# Patient Record
Sex: MALE | Race: WHITE | HISPANIC OR LATINO | Employment: UNEMPLOYED | URBAN - METROPOLITAN AREA
[De-identification: names, ages, dates, MRNs, and addresses within clinical notes are randomized per-mention and may not be internally consistent; named-entity substitution may affect disease eponyms.]

---

## 2017-01-05 ENCOUNTER — ALLSCRIPTS OFFICE VISIT (OUTPATIENT)
Dept: OTHER | Facility: OTHER | Age: 52
End: 2017-01-05

## 2017-01-09 ENCOUNTER — ALLSCRIPTS OFFICE VISIT (OUTPATIENT)
Dept: OTHER | Facility: OTHER | Age: 52
End: 2017-01-09

## 2017-01-09 DIAGNOSIS — M25.561 PAIN IN RIGHT KNEE: ICD-10-CM

## 2017-01-09 DIAGNOSIS — M25.562 PAIN IN LEFT KNEE: ICD-10-CM

## 2017-01-27 ENCOUNTER — ALLSCRIPTS OFFICE VISIT (OUTPATIENT)
Dept: OTHER | Facility: OTHER | Age: 52
End: 2017-01-27

## 2017-02-28 ENCOUNTER — LAB (OUTPATIENT)
Dept: LAB | Facility: CLINIC | Age: 52
End: 2017-02-28
Payer: COMMERCIAL

## 2017-02-28 DIAGNOSIS — E03.9 HYPOTHYROIDISM: ICD-10-CM

## 2017-02-28 LAB
CHOLEST SERPL-MCNC: 178 MG/DL (ref 50–200)
HDLC SERPL-MCNC: 51 MG/DL (ref 40–60)
LDLC SERPL CALC-MCNC: 67 MG/DL (ref 0–100)
TRIGL SERPL-MCNC: 300 MG/DL
TSH SERPL DL<=0.05 MIU/L-ACNC: 2.2 UIU/ML (ref 0.36–3.74)

## 2017-02-28 PROCEDURE — 80061 LIPID PANEL: CPT

## 2017-02-28 PROCEDURE — 36415 COLL VENOUS BLD VENIPUNCTURE: CPT

## 2017-02-28 PROCEDURE — 84443 ASSAY THYROID STIM HORMONE: CPT

## 2017-03-16 ENCOUNTER — ALLSCRIPTS OFFICE VISIT (OUTPATIENT)
Dept: OTHER | Facility: OTHER | Age: 52
End: 2017-03-16

## 2017-06-08 ENCOUNTER — ALLSCRIPTS OFFICE VISIT (OUTPATIENT)
Dept: OTHER | Facility: OTHER | Age: 52
End: 2017-06-08

## 2017-06-08 DIAGNOSIS — E03.9 HYPOTHYROIDISM: ICD-10-CM

## 2017-07-17 ENCOUNTER — HOSPITAL ENCOUNTER (EMERGENCY)
Facility: HOSPITAL | Age: 52
Discharge: HOME | End: 2017-07-17
Attending: EMERGENCY MEDICINE | Admitting: EMERGENCY MEDICINE
Payer: COMMERCIAL

## 2017-07-17 ENCOUNTER — APPOINTMENT (EMERGENCY)
Dept: RADIOLOGY | Facility: HOSPITAL | Age: 52
End: 2017-07-17
Payer: COMMERCIAL

## 2017-07-17 VITALS
SYSTOLIC BLOOD PRESSURE: 110 MMHG | BODY MASS INDEX: 26.52 KG/M2 | OXYGEN SATURATION: 95 % | HEIGHT: 68 IN | TEMPERATURE: 98 F | DIASTOLIC BLOOD PRESSURE: 64 MMHG | RESPIRATION RATE: 16 BRPM | HEART RATE: 76 BPM | WEIGHT: 175 LBS

## 2017-07-17 DIAGNOSIS — R10.84 GENERALIZED ABDOMINAL PAIN: Primary | ICD-10-CM

## 2017-07-17 DIAGNOSIS — K59.00 CONSTIPATION: ICD-10-CM

## 2017-07-17 LAB
ALBUMIN SERPL BCP-MCNC: 3.5 G/DL (ref 3.5–5)
ALP SERPL-CCNC: 67 U/L (ref 46–116)
ALT SERPL W P-5'-P-CCNC: 32 U/L (ref 12–78)
ANION GAP SERPL CALCULATED.3IONS-SCNC: 6 MMOL/L (ref 4–13)
AST SERPL W P-5'-P-CCNC: 15 U/L (ref 5–45)
ATRIAL RATE: 79 BPM
BACTERIA UR QL AUTO: NORMAL /HPF
BASOPHILS # BLD AUTO: 0.02 THOUSANDS/ΜL (ref 0–0.1)
BASOPHILS NFR BLD AUTO: 0 % (ref 0–1)
BILIRUB SERPL-MCNC: 0.29 MG/DL (ref 0.2–1)
BILIRUB UR QL STRIP: NEGATIVE
BUN SERPL-MCNC: 15 MG/DL (ref 5–25)
CALCIUM SERPL-MCNC: 8.8 MG/DL (ref 8.3–10.1)
CHLORIDE SERPL-SCNC: 105 MMOL/L (ref 100–108)
CLARITY UR: CLEAR
CO2 SERPL-SCNC: 27 MMOL/L (ref 21–32)
COLOR UR: YELLOW
CREAT SERPL-MCNC: 1.25 MG/DL (ref 0.6–1.3)
EOSINOPHIL # BLD AUTO: 0.19 THOUSAND/ΜL (ref 0–0.61)
EOSINOPHIL NFR BLD AUTO: 3 % (ref 0–6)
ERYTHROCYTE [DISTWIDTH] IN BLOOD BY AUTOMATED COUNT: 13.9 % (ref 11.6–15.1)
GFR SERPL CREATININE-BSD FRML MDRD: >60 ML/MIN/1.73SQ M
GLUCOSE SERPL-MCNC: 117 MG/DL (ref 65–140)
GLUCOSE UR STRIP-MCNC: NEGATIVE MG/DL
HCT VFR BLD AUTO: 46.5 % (ref 36.5–49.3)
HGB BLD-MCNC: 15.4 G/DL (ref 12–17)
HGB UR QL STRIP.AUTO: ABNORMAL
HOLD SPECIMEN: NORMAL
HYALINE CASTS #/AREA URNS LPF: NORMAL /LPF
KETONES UR STRIP-MCNC: NEGATIVE MG/DL
LEUKOCYTE ESTERASE UR QL STRIP: NEGATIVE
LIPASE SERPL-CCNC: 157 U/L (ref 73–393)
LYMPHOCYTES # BLD AUTO: 1.43 THOUSANDS/ΜL (ref 0.6–4.47)
LYMPHOCYTES NFR BLD AUTO: 21 % (ref 14–44)
MCH RBC QN AUTO: 28.2 PG (ref 26.8–34.3)
MCHC RBC AUTO-ENTMCNC: 33.1 G/DL (ref 31.4–37.4)
MCV RBC AUTO: 85 FL (ref 82–98)
MONOCYTES # BLD AUTO: 0.5 THOUSAND/ΜL (ref 0.17–1.22)
MONOCYTES NFR BLD AUTO: 7 % (ref 4–12)
NEUTROPHILS # BLD AUTO: 4.56 THOUSANDS/ΜL (ref 1.85–7.62)
NEUTS SEG NFR BLD AUTO: 69 % (ref 43–75)
NITRITE UR QL STRIP: NEGATIVE
NON-SQ EPI CELLS URNS QL MICRO: NORMAL /HPF
NRBC BLD AUTO-RTO: 0 /100 WBCS
P AXIS: 61 DEGREES
PH UR STRIP.AUTO: 7 [PH] (ref 4.5–8)
PLATELET # BLD AUTO: 190 THOUSANDS/UL (ref 149–390)
PMV BLD AUTO: 10.3 FL (ref 8.9–12.7)
POTASSIUM SERPL-SCNC: 3.6 MMOL/L (ref 3.5–5.3)
PR INTERVAL: 148 MS
PROT SERPL-MCNC: 7.2 G/DL (ref 6.4–8.2)
PROT UR STRIP-MCNC: NEGATIVE MG/DL
QRS AXIS: 133 DEGREES
QRSD INTERVAL: 82 MS
QT INTERVAL: 388 MS
QTC INTERVAL: 444 MS
RBC # BLD AUTO: 5.47 MILLION/UL (ref 3.88–5.62)
RBC #/AREA URNS AUTO: NORMAL /HPF
SODIUM SERPL-SCNC: 138 MMOL/L (ref 136–145)
SP GR UR STRIP.AUTO: 1.01 (ref 1–1.03)
T WAVE AXIS: 63 DEGREES
TSH SERPL DL<=0.05 MIU/L-ACNC: 1.92 UIU/ML (ref 0.36–3.74)
UROBILINOGEN UR QL STRIP.AUTO: 0.2 E.U./DL
VENTRICULAR RATE: 79 BPM
WBC # BLD AUTO: 6.72 THOUSAND/UL (ref 4.31–10.16)
WBC #/AREA URNS AUTO: NORMAL /HPF

## 2017-07-17 PROCEDURE — 80053 COMPREHEN METABOLIC PANEL: CPT | Performed by: EMERGENCY MEDICINE

## 2017-07-17 PROCEDURE — 96374 THER/PROPH/DIAG INJ IV PUSH: CPT

## 2017-07-17 PROCEDURE — 83690 ASSAY OF LIPASE: CPT | Performed by: EMERGENCY MEDICINE

## 2017-07-17 PROCEDURE — 96361 HYDRATE IV INFUSION ADD-ON: CPT

## 2017-07-17 PROCEDURE — 85025 COMPLETE CBC W/AUTO DIFF WBC: CPT | Performed by: EMERGENCY MEDICINE

## 2017-07-17 PROCEDURE — 99284 EMERGENCY DEPT VISIT MOD MDM: CPT

## 2017-07-17 PROCEDURE — 84443 ASSAY THYROID STIM HORMONE: CPT | Performed by: EMERGENCY MEDICINE

## 2017-07-17 PROCEDURE — 74177 CT ABD & PELVIS W/CONTRAST: CPT

## 2017-07-17 PROCEDURE — 81001 URINALYSIS AUTO W/SCOPE: CPT

## 2017-07-17 PROCEDURE — 93005 ELECTROCARDIOGRAM TRACING: CPT | Performed by: EMERGENCY MEDICINE

## 2017-07-17 PROCEDURE — 36415 COLL VENOUS BLD VENIPUNCTURE: CPT

## 2017-07-17 RX ORDER — ONDANSETRON 2 MG/ML
4 INJECTION INTRAMUSCULAR; INTRAVENOUS ONCE
Status: COMPLETED | OUTPATIENT
Start: 2017-07-17 | End: 2017-07-17

## 2017-07-17 RX ORDER — POLYETHYLENE GLYCOL 3350 17 G/17G
17 POWDER, FOR SOLUTION ORAL DAILY
Qty: 119 G | Refills: 0 | Status: SHIPPED | OUTPATIENT
Start: 2017-07-17 | End: 2018-05-06

## 2017-07-17 RX ORDER — DOCUSATE SODIUM 100 MG/1
100 CAPSULE, LIQUID FILLED ORAL 2 TIMES DAILY
Qty: 30 CAPSULE | Refills: 0 | Status: SHIPPED | OUTPATIENT
Start: 2017-07-17 | End: 2019-03-21

## 2017-07-17 RX ADMIN — SODIUM CHLORIDE 1000 ML: 0.9 INJECTION, SOLUTION INTRAVENOUS at 12:01

## 2017-07-17 RX ADMIN — IOHEXOL 100 ML: 350 INJECTION, SOLUTION INTRAVENOUS at 12:19

## 2017-07-17 RX ADMIN — ONDANSETRON 4 MG: 2 INJECTION INTRAMUSCULAR; INTRAVENOUS at 12:01

## 2017-12-05 ENCOUNTER — APPOINTMENT (OUTPATIENT)
Dept: LAB | Facility: CLINIC | Age: 52
End: 2017-12-05
Payer: COMMERCIAL

## 2017-12-05 DIAGNOSIS — Z79.899 ENCOUNTER FOR LONG-TERM (CURRENT) USE OF MEDICATIONS: Primary | ICD-10-CM

## 2017-12-05 DIAGNOSIS — E03.9 HYPOTHYROIDISM: ICD-10-CM

## 2017-12-05 DIAGNOSIS — E55.9 VITAMIN D INSUFFICIENCY: ICD-10-CM

## 2017-12-05 LAB
25(OH)D3 SERPL-MCNC: 21.8 NG/ML (ref 30–100)
ALBUMIN SERPL BCP-MCNC: 3.3 G/DL (ref 3.5–5)
ALP SERPL-CCNC: 68 U/L (ref 46–116)
ALT SERPL W P-5'-P-CCNC: 60 U/L (ref 12–78)
ANION GAP SERPL CALCULATED.3IONS-SCNC: 5 MMOL/L (ref 4–13)
AST SERPL W P-5'-P-CCNC: 29 U/L (ref 5–45)
BASOPHILS # BLD AUTO: 0.03 THOUSANDS/ΜL (ref 0–0.1)
BASOPHILS NFR BLD AUTO: 1 % (ref 0–1)
BILIRUB SERPL-MCNC: 0.43 MG/DL (ref 0.2–1)
BUN SERPL-MCNC: 14 MG/DL (ref 5–25)
CALCIUM SERPL-MCNC: 8.7 MG/DL (ref 8.3–10.1)
CHLORIDE SERPL-SCNC: 106 MMOL/L (ref 100–108)
CHOLEST SERPL-MCNC: 163 MG/DL (ref 50–200)
CO2 SERPL-SCNC: 27 MMOL/L (ref 21–32)
CREAT SERPL-MCNC: 1.11 MG/DL (ref 0.6–1.3)
EOSINOPHIL # BLD AUTO: 0.27 THOUSAND/ΜL (ref 0–0.61)
EOSINOPHIL NFR BLD AUTO: 5 % (ref 0–6)
ERYTHROCYTE [DISTWIDTH] IN BLOOD BY AUTOMATED COUNT: 14.2 % (ref 11.6–15.1)
EST. AVERAGE GLUCOSE BLD GHB EST-MCNC: 117 MG/DL
FOLATE SERPL-MCNC: 18 NG/ML (ref 3.1–17.5)
GFR SERPL CREATININE-BSD FRML MDRD: 75 ML/MIN/1.73SQ M
GLUCOSE P FAST SERPL-MCNC: 94 MG/DL (ref 65–99)
HBA1C MFR BLD: 5.7 % (ref 4.2–6.3)
HCT VFR BLD AUTO: 44.8 % (ref 36.5–49.3)
HDLC SERPL-MCNC: 47 MG/DL (ref 40–60)
HGB BLD-MCNC: 14.7 G/DL (ref 12–17)
LDLC SERPL CALC-MCNC: 80 MG/DL (ref 0–100)
LYMPHOCYTES # BLD AUTO: 1.84 THOUSANDS/ΜL (ref 0.6–4.47)
LYMPHOCYTES NFR BLD AUTO: 34 % (ref 14–44)
MCH RBC QN AUTO: 28.1 PG (ref 26.8–34.3)
MCHC RBC AUTO-ENTMCNC: 32.8 G/DL (ref 31.4–37.4)
MCV RBC AUTO: 86 FL (ref 82–98)
MONOCYTES # BLD AUTO: 0.52 THOUSAND/ΜL (ref 0.17–1.22)
MONOCYTES NFR BLD AUTO: 10 % (ref 4–12)
NEUTROPHILS # BLD AUTO: 2.67 THOUSANDS/ΜL (ref 1.85–7.62)
NEUTS SEG NFR BLD AUTO: 50 % (ref 43–75)
NRBC BLD AUTO-RTO: 0 /100 WBCS
PLATELET # BLD AUTO: 180 THOUSANDS/UL (ref 149–390)
PMV BLD AUTO: 10.8 FL (ref 8.9–12.7)
POTASSIUM SERPL-SCNC: 3.8 MMOL/L (ref 3.5–5.3)
PROT SERPL-MCNC: 7.2 G/DL (ref 6.4–8.2)
PSA SERPL-MCNC: 0.6 NG/ML (ref 0–4)
RBC # BLD AUTO: 5.24 MILLION/UL (ref 3.88–5.62)
SODIUM SERPL-SCNC: 138 MMOL/L (ref 136–145)
T4 FREE SERPL-MCNC: 1.05 NG/DL (ref 0.76–1.46)
TRIGL SERPL-MCNC: 182 MG/DL
TSH SERPL DL<=0.05 MIU/L-ACNC: 2.89 UIU/ML (ref 0.36–3.74)
VIT B12 SERPL-MCNC: 236 PG/ML (ref 100–900)
WBC # BLD AUTO: 5.36 THOUSAND/UL (ref 4.31–10.16)

## 2017-12-05 PROCEDURE — 82607 VITAMIN B-12: CPT

## 2017-12-05 PROCEDURE — 80053 COMPREHEN METABOLIC PANEL: CPT

## 2017-12-05 PROCEDURE — G0103 PSA SCREENING: HCPCS

## 2017-12-05 PROCEDURE — 85025 COMPLETE CBC W/AUTO DIFF WBC: CPT

## 2017-12-05 PROCEDURE — 36415 COLL VENOUS BLD VENIPUNCTURE: CPT

## 2017-12-05 PROCEDURE — 82746 ASSAY OF FOLIC ACID SERUM: CPT

## 2017-12-05 PROCEDURE — 80061 LIPID PANEL: CPT

## 2017-12-05 PROCEDURE — 83036 HEMOGLOBIN GLYCOSYLATED A1C: CPT

## 2017-12-05 PROCEDURE — 84439 ASSAY OF FREE THYROXINE: CPT

## 2017-12-05 PROCEDURE — 82306 VITAMIN D 25 HYDROXY: CPT

## 2017-12-05 PROCEDURE — 84443 ASSAY THYROID STIM HORMONE: CPT

## 2017-12-14 ENCOUNTER — GENERIC CONVERSION - ENCOUNTER (OUTPATIENT)
Dept: OTHER | Facility: OTHER | Age: 52
End: 2017-12-14

## 2017-12-14 DIAGNOSIS — M25.562 PAIN IN LEFT KNEE: ICD-10-CM

## 2017-12-14 DIAGNOSIS — M25.561 PAIN IN RIGHT KNEE: ICD-10-CM

## 2018-01-10 NOTE — RESULT NOTES
Message   pt has appt with me on 8/23/16- will discuss whether to reduce to 137mcg/day or alternate with the 150mcg every other day    will also supplement the low Vit D = 25) Plan to discuss with pt at appt in less than 2 weeks  Verified Results  (1) COMPREHENSIVE METABOLIC PANEL [id number] 98:76KL Ana Bae     Test Name Result Flag Reference   GLUCOSE,RANDM 91 mg/dL     If the patient is fasting, the ADA then defines impaired fasting glucose as > 100 mg/dL and diabetes as > or equal to 123 mg/dL  SODIUM 141 mmol/L  136-145   POTASSIUM 3 5 mmol/L  3 5-5 3   CHLORIDE 105 mmol/L  100-108   CARBON DIOXIDE 29 mmol/L  21-32   ANION GAP (CALC) 7 mmol/L  4-13   BLOOD UREA NITROGEN 12 mg/dL  5-25   CREATININE 1 07 mg/dL  0 60-1 30   Standardized to IDMS reference method   CALCIUM 8 9 mg/dL  8 3-10 1   BILI, TOTAL 0 28 mg/dL  0 20-1 00   ALK PHOSPHATAS 72 U/L     ALT (SGPT) 23 U/L  12-78   AST(SGOT) 12 U/L  5-45   ALBUMIN 3 5 g/dL  3 5-5 0   TOTAL PROTEIN 7 2 g/dL  6 4-8 2   eGFR Non-African American      >60 0 ml/min/1 73sq [facility] Disease Education Program recommendations are as follows:  GFR calculation is accurate only with a steady state creatinine  Chronic Kidney disease less than 60 ml/min/1 73 sq  meters  Kidney failure less than 15 ml/min/1 73 sq  meters  (1) TSH [id number] 09:59AM Ana Bae     Test Name Result Flag Reference   TSH 0 160 uIU/mL L 0 358-3 740   Patients undergoing fluorescein dye angiography may retain small amounts of fluorescein in the body for 48-72 hours post procedure  Samples containing fluorescein can produce falsely depressed TSH values  If the patient had this procedure,a specimen should be resubmitted post fluorescein clearance       (1) LIPID PANEL, FASTING [id number] 09:59AM Ana Bae     Test Name Result Flag Reference   CHOLESTEROL 150 mg/dL     HDL,DIRECT 48 mg/dL  40-60   Specimen collection should occur prior to Metamizole administration due to the potential for falsely depressed results  LDL CHOLESTEROL CALCULATED 54 mg/dL  0-100   Triglyceride:         Normal              <150 mg/dl       Borderline High    150-199 mg/dl       High               200-499 mg/dl       Very High          >499 mg/dl  Cholesterol:         Desirable        <200 mg/dl      Borderline High  200-239 mg/dl      High             >239 mg/dl  HDL Cholesterol:        High    >59 mg/dL      Low     <41 mg/dL  LDL CALCULATED:    This screening LDL is a calculated result  It does not have the accuracy of the Direct Measured LDL in the monitoring of patients with hyperlipidemia and/or statin therapy  Direct Measure LDL (ABD863) must be ordered separately in these patients  TRIGLYCERIDES 240 mg/dL H <=150   Specimen collection should occur prior to N-Acetylcysteine or Metamizole administration due to the potential for falsely depressed results         Plan  Low vitamin D level    · Vitamin D3 2000 UNIT Oral Capsule; TAKE 1 TABLET BY MOUTH ONCE DAILY

## 2018-01-12 VITALS
BODY MASS INDEX: 26.2 KG/M2 | SYSTOLIC BLOOD PRESSURE: 110 MMHG | TEMPERATURE: 98.3 F | HEART RATE: 60 BPM | DIASTOLIC BLOOD PRESSURE: 70 MMHG | WEIGHT: 172.84 LBS | HEIGHT: 68 IN

## 2018-01-12 NOTE — RESULT NOTES
Message   please call pt and find out if he's been taking his Levothyroxine- in the early AM, on an empty stomach    his TSH is elevated, but first verify meds being taken  Verified Results  (1) TSH WITH FT4 REFLEX [id number] 12:16PM Malou Prim     Test Name Result Flag Reference   TSH 11 000 uIU/mL H 0 358-3 740   Patients undergoing fluorescein dye angiography may retain small amounts of fluorescein in the body for 48-72 hours post procedure  Samples containing fluorescein can produce falsely depressed TSH values  If the patient had this procedure,a specimen should be resubmitted post fluorescein clearance

## 2018-01-13 VITALS
BODY MASS INDEX: 25.08 KG/M2 | TEMPERATURE: 97.2 F | HEIGHT: 69 IN | WEIGHT: 169.31 LBS | DIASTOLIC BLOOD PRESSURE: 64 MMHG | HEART RATE: 72 BPM | SYSTOLIC BLOOD PRESSURE: 102 MMHG

## 2018-01-13 VITALS
SYSTOLIC BLOOD PRESSURE: 108 MMHG | HEIGHT: 69 IN | HEART RATE: 80 BPM | DIASTOLIC BLOOD PRESSURE: 60 MMHG | WEIGHT: 169.75 LBS | BODY MASS INDEX: 25.14 KG/M2 | TEMPERATURE: 97.6 F

## 2018-01-14 VITALS
TEMPERATURE: 97.3 F | HEIGHT: 68 IN | HEART RATE: 72 BPM | BODY MASS INDEX: 25.73 KG/M2 | DIASTOLIC BLOOD PRESSURE: 80 MMHG | WEIGHT: 169.75 LBS | SYSTOLIC BLOOD PRESSURE: 100 MMHG

## 2018-01-14 VITALS
BODY MASS INDEX: 26.4 KG/M2 | DIASTOLIC BLOOD PRESSURE: 68 MMHG | WEIGHT: 174.16 LBS | TEMPERATURE: 98.3 F | HEIGHT: 68 IN | HEART RATE: 74 BPM | SYSTOLIC BLOOD PRESSURE: 110 MMHG

## 2018-01-14 NOTE — PROGRESS NOTES
Assessment    1  Left knee pain (719 46) (M25 562)    Plan    · Meloxicam 15 MG Oral Tablet; TAKE 1 TABLET DAILY WITH FOOD   · *1 - [facility] Physical Therapy  Consult  Status: Hold For - Scheduling   Requested for: 20Apr2016  Care Summary provided  : Yes   · Follow-up PRN Evaluation and Treatment  Follow-up  Status: Complete  Done:  20Apr2016    Discussion/Summary    44 yo male with left knee pain secondary to osteoarthritis  Pt's pain controlled with meloxicam and tramadol  Will have patient complete physical therapy and follow up prn  Chief Complaint  left knee pain      History of Present Illness  HPI: 44 yo male with left knee pain secondary to arthritis  Pt taking meloxicam and tramadol which works well for his pain  Pt was to go for Physical therapy but needed to cancel due to heart problems  Pt seen by cardiologist and stress echo was normal        Hospital Based Practices Required Assessment:   Pain Assessment   the patient states they have pain  The pain is located in the L knee  The patient describes the pain as "tight"  (on a scale of 0 to 10, the patient rates the pain at 7 )    Prefered Language is  Jamaican  Primary Language is  Jamaican  Review of Systems    Musculoskeletal: as noted in HPI  ROS reviewed  Active Problems    1  Allergic rhinitis (477 9) (J30 9)   2  Bipolar disorder (296 80) (F31 9)   3  Cataract, left eye (366 9) (H26 9)   4  Chest pain (786 50) (R07 9)   5  Chronic low back pain (724 2,338 29) (M54 5,G89 29)   6  Constipation (564 00) (K59 00)   7  Epidermoid cyst of skin (706 2) (L72 0)   8  Glaucoma of both eyes (365 9) (H40 9)   9  Hyperlipidemia (272 4) (E78 5)   10  Hypothyroidism (244 9) (E03 9)   11  Left knee pain (719 46) (M25 562)   12  History of Need for prophylactic vaccination and inoculation against influenza (V04 81)    (Z23)   13  Pain in joint of left hip (719 45) (M25 552)   14   Right knee pain (719 46) (M25 561)    Past Medical History    · History of Cerumen impaction (380 4) (H61 20)   · History of Contusion of skin with intact surface (924 9) (T14 8)   · History of upper respiratory infection (V12 09) (Z87 09)   · History of Need for immunization against influenza (V04 81) (Z23)   · History of Need for prophylactic vaccination and inoculation against influenza (V04 81)  (Z23)   · Personal history of hyperthyroidism (V12 29) (Z86 39)   · History of Shoulder joint pain, unspecified laterality   · History of Sprain of ankle (845 00) (S93 409A)   · History of Viral upper respiratory infection (465 9) (J06 9,B97 89)    The active problems and past medical history were reviewed and updated today  Surgical History    · History of Cholecystectomy Laparoscopic   · History of Eye Surgery    The surgical history was reviewed and updated today  Family History    · Family history of diabetes mellitus (V18 0) (Z83 3)   · Family history of hyperlipidemia (V18 19) (Z83 49)   · Family history of Thyroid disorder    · Family history of dementia (V17 2) (Z81 8)    · Family history of CAD (coronary artery disease)   · Family history of hypertension (V17 49) (Z82 49)   · Family history of myocardial infarction (V17 3) (Z82 49)    The family history was reviewed and updated today  Social History    · Being A Social Drinker   · Marital History - Single   · Never A Smoker   · No drug use   · Non-smoker (V49 89) (Z78 9)   · Occasional caffeine consumption   · Sedentary Lifestyle (V69 0)  The social history was reviewed and updated today  Current Meds   1  Brimonidine Tartrate 0 15 % Ophthalmic Solution; INSTILL 1 DROP INTO BOTH EYES   TWICE DAILY; Therapy: (Recorded:08Oct2015) to Recorded   2  Docusate Sodium 100 MG Oral Capsule; TOME CORNELL CAPSULA POR VIA ORAL DOS   VECES AL SOPHY ASHELY FUERA NECESARIOTAKE ONE CAPSULE BY MOUTH TWICE   DAILY AS NEEDED; Therapy: 26ARI2398 to (Last Rx:19Apr2016)  Requested for: 19Apr2016 Ordered   3  Fluticasone Propionate 50 MCG/ACT Nasal Suspension; USE 2 SPRAYS IN EACH   NOSTRIL ONCE DAILY; Therapy: 03JDX0226 to (Last Rx:08Oct2015)  Requested for: 30IJK1724 Ordered   4  Latanoprost 0 005 % Ophthalmic Solution; INSTILL 1 DROP  INTO AFFECTED EYE(S)   ONCE DAILY AS DIRECTED; Therapy: (Recorded:08Oct2015) to Recorded   5  Levothyroxine Sodium 150 MCG Oral Tablet; TAKE ONE TABLET BY MOUTH ONCE A   DAY; Therapy: 66Mvs4919 to (Reyna Mcdonald)  Requested for: 67EMU9035; Last   Rx:10Mar2016 Ordered   6  Loratadine 10 MG Oral Tablet; TAKE 1 TABLET DAILY; Therapy: 89UXS7450 to (Juan David Rivera)  Requested for: 65LKC0448; Last   Rx:08Oct2015 Ordered   7  Lovastatin 10 MG Oral Tablet; TAKE 1 TABLET AT BEDTIME; Therapy: 90VYC2526 to (Reyna Mcdonald)  Requested for: 49VRH1679; Last   Rx:10Mar2016 Ordered   8  Meloxicam 15 MG Oral Tablet; TAKE 1 TABLET DAILY WITH FOOD; Therapy: 60AHU0821 to (Tung Lopez)  Requested for: 69QCI1592; Last   Rx:02Nov2015 Ordered   9  Polyethylene Glycol 3350 Oral Powder; MIX 1 CAPFUL (17GM) IN 8 OUNCES OF   WATER, JUICE, OR TEA AND DRINK DAILY; Therapy: 18Klr1210 to (Juan David Rivera)  Requested for: 98Twd6809; Last   Rx:08Oct2015 Ordered   10  SEROquel 100 MG Oral Tablet; Therapy: [phone]) to Recorded   11  Timolol Maleate 0 5 % Ophthalmic Solution; pt takes 1 gtt into both eyes every AM;    Therapy: (Recorded:08Oct2015) to Recorded   12  TraMADol HCl - 50 MG Oral Tablet; 1-2 tabs po q 4-6 hrs prn; Therapy: 08Apr2015 to (Last Rx:02Nov2015) Ordered   13  TraZODone HCl - 150 MG Oral Tablet; Therapy: [phone]) to Recorded    The medication list was reviewed and updated today  Allergies    1  No Known Drug Allergies    2  Pollen   3   Ragweed    Vitals   Recorded: 20Apr2016 10:36AM   Temperature 97 8 F   Heart Rate 76   Systolic 90   Diastolic 56   Height 5 ft 6 in   Weight 153 lb 14 08 oz   BMI Calculated 24 84   BSA Calculated 1 79     Physical Exam  Left knee- FROM, positive crepitus, no laxity or swelling      End of Encounter Meds    1  Fluticasone Propionate 50 MCG/ACT Nasal Suspension; USE 2 SPRAYS IN EACH   NOSTRIL ONCE DAILY; Therapy: 08CEL4753 to (Last Rx:08Oct2015)  Requested for: 14KLA1658 Ordered   2  Loratadine 10 MG Oral Tablet; TAKE 1 TABLET DAILY; Therapy: 02TNT9756 to (German Easley)  Requested for: 29ETE2169; Last   Rx:08Oct2015 Ordered    3  Brimonidine Tartrate 0 15 % Ophthalmic Solution; INSTILL 1 DROP INTO BOTH EYES   TWICE DAILY; Therapy: (Recorded:08Oct2015) to Recorded   4  Latanoprost 0 005 % Ophthalmic Solution; INSTILL 1 DROP  INTO AFFECTED EYE(S)   ONCE DAILY AS DIRECTED; Therapy: (Recorded:08Oct2015) to Recorded   5  Timolol Maleate 0 5 % Ophthalmic Solution; pt takes 1 gtt into both eyes every AM;   Therapy: (Recorded:08Oct2015) to Recorded    6  TraMADol HCl - 50 MG Oral Tablet; 1-2 tabs po q 4-6 hrs prn; Therapy: 08Apr2015 to (Last Rx:02Nov2015) Ordered    7  Docusate Sodium 100 MG Oral Capsule; TOME CORNELL CAPSULA POR VIA ORAL DOS   VECES AL SOPHY ASHELY FUERA NECESARIOTAKE ONE CAPSULE BY MOUTH TWICE   DAILY AS NEEDED; Therapy: 55BXP5194 to (Last Rx:19Apr2016)  Requested for: 19Apr2016 Ordered    8  Lovastatin 10 MG Oral Tablet; TAKE 1 TABLET AT BEDTIME; Therapy: 62DJV8780 to (Kirstyn Wiseman)  Requested for: 69PYK6755; Last   Rx:10Mar2016 Ordered    9  Levothyroxine Sodium 150 MCG Oral Tablet; TAKE ONE TABLET BY MOUTH ONCE A   DAY; Therapy: 96Ixl0073 to (Kristyn Wiseman)  Requested for: 81UNR2910; Last   Rx:10Mar2016 Ordered   10  Polyethylene Glycol 3350 Oral Powder; MIX 1 CAPFUL (17GM) IN 8 OUNCES OF    WATER, JUICE, OR TEA AND DRINK DAILY; Therapy: 34Rgw3371 to (German Easley)  Requested for: 27Qqc0608; Last    Rx:08Oct2015 Ordered    11  Meloxicam 15 MG Oral Tablet; TAKE 1 TABLET DAILY WITH FOOD;     Therapy: 14IPC7228 to (Evaluate:41Elp6824)  Requested for: 20Apr2016; Last    Rx:20Apr2016 Ordered    12  SEROquel 100 MG Oral Tablet (QUEtiapine Fumarate); Therapy: (Recorded:08Oct2015) to Recorded   13  TraZODone HCl - 150 MG Oral Tablet; Therapy: (Recorded:08Oct2015) to Recorded    Future Appointments    Date/Time Provider Specialty Site   04/28/2016 10:20 AM Lissette Manning PCP   04/22/2016 11:15 AM Stan Barreto MD Cardiology [place] [id number]     Signatures   Electronically signed by : HOA Eagle;  Apr 20 2016 11:01AM EST                       (Author)    Electronically signed by : GINGER Castillo ; Apr 20 2016  1:07PM EST

## 2018-01-15 NOTE — PROGRESS NOTES
Assessment    1  Bilateral knee pain (719 46) (M25 561,M25 562)   2  Depression with anxiety (300 4) (F41 8)   3  Impacted cerumen of left ear (380 4) (H61 22)   4  Hypothyroidism (244 9) (E03 9)   5  Hyperlipidemia (272 4) (E78 5)    Plan   Allergic rhinitis    · Alaway 0 025 % Ophthalmic Solution; INSTILL 1 DROP IN THE AFFECTED EYE(S) EVERY 12  HOURS AS NEEDED   · Fluticasone Propionate 50 MCG/ACT Nasal Suspension; USE 2 SPRAYS IN EACH NOSTRIL  ONCE DAILY   · Loratadine 10 MG Oral Tablet; TAKE 1 TABLET DAILY  Bipolar disorder    · From  SEROquel 100 MG Oral Tablet  To QUEtiapine Fumarate 100 MG Oral Tablet  (SEROquel) TAKE 1 TABLET AT BEDTIME  Cataract, left eye, Glaucoma of both eyes    · Brimonidine Tartrate 0 15 % Ophthalmic Solution; INSTILL 1 DROP INTO BOTH EYES TWICE  DAILY   · Latanoprost 0 005 % Ophthalmic Solution; INSTILL 1 DROP  INTO AFFECTED EYE(S) ONCE  DAILY AS DIRECTED   · Timolol Maleate 0 5 % Ophthalmic Solution; pt takes 1 gtt into both eyes every AM  Chronic low back pain, PMH: Left knee pain, Pain in joint of left hip, PMH: Right knee pain    · From  TraMADol HCl - 50 MG Oral Tablet 1-2 tabs po q 4-6 hrs prn To TraMADol HCl - 50  MG Oral Tablet TAKE 1 TABLET EVERY 4 TO 6 HOURS AS NEEDED  Constipation    · Docusate Sodium 100 MG Oral Capsule; TAKE 1 CAPSULE TWICE DAILY AS NEEDED  Hyperlipidemia    · Lovastatin 10 MG Oral Tablet; TAKE 1 TABLET AT BEDTIME  Hypothyroidism    · Levothyroxine Sodium 150 MCG Oral Tablet   · Levothyroxine Sodium 125 MCG Oral Tablet; take 1 tab po daily   · Polyethylene Glycol 3350 Oral Powder; MIX 1 CAPFUL (17GM) IN 8 OUNCES OF WATER,  JUICE, OR TEA AND DRINK DAILY   · (1) LIPID PANEL, FASTING; Status:Active; Requested for:15Feb2017;    · (1) TSH WITH FT4 REFLEX; Status:Active; Requested for:01Oct2016;    · (1) TSH; Status:Active; Requested for:15Feb2017;    · *VB-Depression Screening; Status:Active;  Requested for:15Feb2017;   Impacted cerumen of left ear    · Debrox 6 5 % Otic Solution; Instill 5-10 drops twice daily for up to 4 days   · Clean the outside of your ears with a washcloth  There is no need to clean the ear canal ;  Status:Complete;   Done: 68ABC2582   · How to use ear drops:; Status:Complete;   Done: 87KZN1134   · Never put cotton swabs inside your ears ; Status:Complete;   Done: 75PZF2631   · To remove earwax at home, use a soft-tipped rubber ear syringe ; Status:Complete;   Done:  83VMZ1966  Low vitamin D level    · Vitamin D3 2000 UNIT Oral Capsule; TAKE 1 TABLET BY MOUTH ONCE DAILY  PMH: Left knee pain    · Meloxicam 15 MG Oral Tablet; TAKE 1 TABLET DAILY WITH FOOD    *1 - [address] [facility] Physician Referral  Consult  Status: Hold For - Scheduling  Requested for: 67Xdf8826  Ordered; For: Bilateral knee pain;  Ordered By: Shauna Herman  Performed:   Due: 93QKW6299     Discussion/Summary  Discussion Summary:   I'm going to reduce your Levothyroixine from 150mcg/day DOWN to 125mcg/day  since doing that - we will recheck your TFTs in 6-8 weeks (October)  f/u with me every 6months  next do FEB 2017  do labs before next appt also  no other med changes  rx: Debrox drops to L ear wax impaction  f/u if not better  rec flushot in the Fall  Medication SE Review and Pt Understands Tx: Possible side effects of new medications were reviewed with the patient/guardian today  The treatment plan was reviewed with the patient/guardian  The patient/guardian understands and agrees with the treatment plan      Chief Complaint  Chief Complaint Free Text Note Form: here for early 6mo f/u  he likes to come every 3 months instead of every 6months  has hypothyroidism/ cholesterol/ constipation/  had recent labs done and for review   Chief Complaint Chronic Condition [facility]: Patient is here today for follow up of chronic conditions described in HPI        History of Present Illness  HPI: as above  most of his labs good- will adjust thyroid pill and put on Vit D supplement for Vit D insufficiency  pt c/o R knee 'clicking" with walking  he walks everyday but for past 2 weeks d/o R knee clicking and L knee pains   Hospital Based Practices Required Assessment:   Pain Assessment   the patient states they have pain  The pain is located in the Patient states pain in both knees  The patient describes the pain as unable to describe  (on a scale of 0 to 10, the patient rates the pain at 8 )    Prefered Language is  Taiwanese  Primary Language is  Taiwanese  Review of Systems  Complete-Male:   Constitutional: as noted in HPI  Eyes: eyesight problems and wears glasses- has eye doctor appt with Dr Valentín Harvey this week, but as noted in HPI    ENT: itchy ears lucila, but no pain or difficulty hearing, but as noted in HPI  Cardiovascular: as noted in HPI  Respiratory: No complaints of shortness of breath, no wheezing, no cough, no SOB on exertion, no orthopnea or PND  Gastrointestinal: constipation and takes miralax and now has BM 2x/day, but as noted in HPI  Genitourinary: No complaints of dysuria, no incontinence, no hesitancy, no nocturia, no genital lesion, no testicular pain  Musculoskeletal: lucila knee pains, but as noted in HPI  Psychiatric: anxiety, depression and sees [facility]/ Dr Alondra Savage and takes meds, but as noted in HPI  Endocrine: hypothyroid, but as noted in HPI  ROS Reviewed:   ROS reviewed  Active Problems    1  Allergic rhinitis (477 9) (J30 9)   2  Bipolar disorder (296 80) (F31 9)   3  Cataract, left eye (366 9) (H26 9)   4  Chronic low back pain (724 2,338 29) (M54 5,G89 29)   5  Constipation (564 00) (K59 00)   6  Epidermoid cyst of skin (706 2) (L72 0)   7  Glaucoma of both eyes (365 9) (H40 9)   8  Hyperlipidemia (272 4) (E78 5)   9  Hypothyroidism (244 9) (E03 9)   10  Low vitamin D level (268 9) (E55 9)   11  History of Need for prophylactic vaccination and inoculation against influenza (V04 81) (Z23)   12   Pain in joint of left hip (719 45) (M25 552)    Past Medical History    1  History of Cerumen impaction (380 4) (H61 20)   2  History of Contusion of skin with intact surface (924 9) (T14 8)   3  History of chest pain (V13 89) (Z87 898)   4  History of upper respiratory infection (V12 09) (Z87 09)   5  History of Left knee pain (719 46) (M25 562)   6  History of Need for immunization against influenza (V04 81) (Z23)   7  History of Need for prophylactic vaccination and inoculation against influenza (V04 81) (Z23)   8  Personal history of hyperthyroidism (V12 29) (Z86 39)   9  History of Right knee pain (719 46) (M25 561)   10  History of Screening for breast cancer (V76 10) (Z12 39)   11  History of Shoulder joint pain, unspecified laterality   12  History of Sprain of ankle (845 00) (S93 409A)   13  History of Viral upper respiratory infection (465 9) (J06 9,B97 89)    Surgical History    1  History of Cholecystectomy Laparoscopic   2  History of Eye Surgery    Family History  Mother    1  Family history of diabetes mellitus (V18 0) (Z83 3)   2  Family history of hyperlipidemia (V18 19) (Z83 49)   3  Family history of Thyroid disorder  Father    4  Family history of dementia (V17 2) (Z81 8)  Maternal Grandmother    5  Family history of CAD (coronary artery disease)   6  Family history of hypertension (V17 49) (Z82 49)   7  Family history of myocardial infarction (V17 3) (Z82 49)    Social History    · Being A Social Drinker   · Marital History - Single   · Never A Smoker   · No drug use   · Non-smoker (V49 89) (Z78 9)   · Occasional caffeine consumption   · Sedentary Lifestyle (V69 0)    Current Meds   1  Alaway 0 025 % Ophthalmic Solution; INSTILL 1 DROP IN THE AFFECTED EYE(S) EVERY 12 HOURS   AS NEEDED; Therapy: 90WTV0943 to (Lawson Seen)  Requested for: 11GCI0293; Last Rx:75Gyu2729   Ordered   2  Ativan 1 MG Oral Tablet; TAKE 1 TABLET AT BEDTIME; Therapy: (Viveca Sero) to Recorded   3   Brimonidine Tartrate 0 15 % Ophthalmic Solution; INSTILL 1 DROP INTO BOTH EYES TWICE DAILY; Therapy: (Demi Friedman) to Recorded   4  Docusate Sodium 100 MG Oral Capsule; TAKE 1 CAPSULE TWICE DAILY AS NEEDED; Therapy: 34OWX2731 to (Evaluate:13Nov2016)  Requested for: 37XPG7542; Last Rx:17May2016   Ordered   5  Fluticasone Propionate 50 MCG/ACT Nasal Suspension; USE 2 SPRAYS IN EACH NOSTRIL ONCE   DAILY; Therapy: 23Apr2015 to (Last Rx:17May2016)  Requested for: 48CFJ3456 Ordered   6  Latanoprost 0 005 % Ophthalmic Solution; INSTILL 1 DROP  INTO AFFECTED EYE(S) ONCE DAILY AS   DIRECTED; Therapy: (Demi Friedman) to Recorded   7  Levothyroxine Sodium 125 MCG Oral Tablet; take 1 tab po daily; Therapy: (Demi Friedman) to Recorded   8  Levothyroxine Sodium 150 MCG Oral Tablet; TAKE ONE TABLET BY MOUTH ONCE A DAY; Therapy: 17Bnh3781 to ([facility])  Requested for: 96YCN3568; Last Rx:10Mar2016   Ordered   9  Loratadine 10 MG Oral Tablet; TAKE 1 TABLET DAILY; Therapy: 48NXD3319 to (Evaluate:13Nov2016)  Requested for: 30WMH6440; Last Rx:17May2016   Ordered   10  Lovastatin 10 MG Oral Tablet; TAKE 1 TABLET AT BEDTIME; Therapy: 62PYH1986 to ([facility])  Requested for: 84LQX6827; Last Rx:10Mar2016    Ordered   11  Meloxicam 15 MG Oral Tablet; TAKE 1 TABLET DAILY WITH FOOD; Therapy: 01TXC3461 to (Evaluate:74Teh9747)  Requested for: 35FQY0663; Last Rx:20Apr2016    Ordered   12  Polyethylene Glycol 3350 Oral Powder; MIX 1 CAPFUL (17GM) IN 8 OUNCES OF WATER, JUICE, OR    TEA AND DRINK DAILY; Therapy: 55Rkm7459 to (Evaluate:13Nov2016)  Requested for: 29UHI4995; Last Rx:17May2016    Ordered   13  SEROquel 100 MG Oral Tablet; Therapy: (Recorded:17May2016) to Recorded   14  Timolol Maleate 0 5 % Ophthalmic Solution; pt takes 1 gtt into both eyes every AM;    Therapy: (Recorded:89Bvl7145) to Recorded   15  TraMADol HCl - 50 MG Oral Tablet; 1-2 tabs po q 4-6 hrs prn;     Therapy: 08Apr2015 to (Last Rx:27Apr2016) Ordered   16  TraZODone HCl - 150 MG Oral Tablet; TAKE 1 TABLET AT BEDTIME; Therapy: (Ofelia Rodriguez) to Recorded   17  Vitamin D3 2000 UNIT Oral Capsule; TAKE 1 TABLET BY MOUTH ONCE DAILY; Therapy: 11Aug2016 to (Evaluate:50Xlg7713)  Requested for: 11Aug2016; Last Rx:11Aug2016    Ordered    Allergies    1  No Known Drug Allergies    2  Pollen   3  Ragweed    Vitals  Vital Signs    Recorded: 88DJF2039 42:56UA   Systolic 268   Diastolic 76   Heart Rate 80   Temperature 97 6 F   Height 5 ft 6 in   Weight 152 lb 8 91 oz   BMI Calculated 24 62   BSA Calculated 1 78     Physical Exam    Constitutional   General appearance: No acute distress, well appearing and well nourished  Eyes   Conjunctiva and lids: No erythema, swelling or discharge  Pupils and irises: Equal, round, reactive to light  Ears, Nose, Mouth, and Throat   External inspection of ears and nose: Abnormal     Otoscopic examination: Abnormal   L cerumen impaction, R TM normal    Nasal mucosa, septum, and turbinates: Normal without edema or erythema  Lips, teeth, and gums: Normal, good dentition  Oropharynx: Normal with no erythema, edema, exudate or lesions  Neck   Neck: Supple, symmetric, trachea midline, no masses  Thyroid: Normal, no thyromegaly  Pulmonary   Respiratory effort: No increased work of breathing or signs of respiratory distress  Auscultation of lungs: Clear to auscultation  Cardiovascular   Auscultation of heart: Normal rate and rhythm, normal S1 and S2, no murmurs  Pedal pulses: 2+ bilaterally  Examination of extremities for edema and/or varicosities: Normal     Lymphatic   Palpation of lymph nodes in neck: No lymphadenopathy  Musculoskeletal   Gait and station: Normal     Inspection/palpation of digits and nails: Normal without clubbing or cyanosis      Inspection/palpation of joints, bones, and muscles: Abnormal   lucila patellar pain, no swelling or redness or warmth,    Range of motion: Abnormal   + "click" audible with flexion of R knee only, no laxity, otherwise FROM, L knee normal    Skin   Skin and subcutaneous tissue: Normal without rashes or lesions  Palpation of skin and subcutaneous tissue: Normal turgor  Neurologic   Sensation: No sensory loss  Psychiatric   Judgment and insight: Normal     Mood and affect: Normal        Health Management  Encounter for preventive health examination   COLONOSCOPY; every 10 years; Last 35Fbn2612; Next Due: 25Ovp7834; Active    Attending Note  Collaborating Physician Note: Collaborating Physician: I agree with the Advanced Practitioner note  Future Appointments    Date/Time Provider Specialty Site   09/12/2016 01:00 PM Specialty Clinic, Ortho Room [address] [place] [address]     Signatures   Electronically signed by : Kera Armenta, [facility];  Aug 23 2016 11:34AM EST                       (Author)    Electronically signed by : GINGER Sy ; Aug 23 2016  4:42PM EST                       (Review)

## 2018-01-15 NOTE — PROGRESS NOTES
Assessment  Assessed    1  Chest pain (786 50) (R07 9)   2  Cardiomyopathy (425 4) (I42 9)    Plan  Cardiomyopathy, Chest pain, Hyperlipidemia    · ECHO COMPLETE WITH CONTRAST IF INDICATED, TTE / TRANSTHORACIC;  Status:Need Information - Financial Authorization; Requested for:22Apr2016;    Perform:[facility]; Due:22Apr2017;Ordered; For:Cardiomyopathy, Chest pain, Hyperlipidemia; Ordered By:Ila Gutierrez;    Discussion/Summary  Cardiology Discussion Summary Free Text Note Form [facility]:   Mr Lonnie Lagos is a 45year old gentleman with a past medical history of hyperlipidemia who presents following a stress echo done for atypical chest pain    # Chest pain:  - His chest pain was atypical in character  Stress echo did not demonstrate inducible ischemia  - Emphasized the importance of regular exercise  - Emphasized the need to have a healthier diet  Recommend DASH diet and a low salt diet  # Cardiomyopathy:  -- Mild diffuse hypokinesis noted on baseline images during stress echo  -- Will get a full echocardiogram study performed to evaluate LV function    # Hyperlipidemia:  - Is currently on lovastatin 10 mg oral daily  # Follow up: Will follow up after echocardiography  Counseling Documentation With Imm: The patient was counseled regarding diagnostic results, instructions for management, risk factor reductions, prognosis, patient and family education, impressions, importance of compliance with treatment  Chief Complaint  Chief Complaint Free Text Note Form: Follow up after stress testing      History of Present Illness  Cardiology HPI Free Text Note Form [facility]: 45year old man with past medical history of hyperlipidemia presents to clinic to day for follow up of his chest pain and stress echo  Briefly, he was seen on 9/15 in [facility] for chest pain  Chest pain started night prior to his ER visit  He states it was a "burning" sensation and similar to reflux   The next morning he had another episode of crushing substernal chest pain associated with severe headaches and diaphoresis  He decided to come to ER where he was ruled out for ACS  EKG was unremarkable except for poor R wave progression and troponin were negative  He was subsequently discharged  Seen on follow up where a stress echo was recommended  She just completed his stress echo and is seen today for followup  No further chest pain episodes  No SOB  However, he is not very active at home and lives a very sedentary lifestyle  He does climb two flights of stairs without any symptoms  Denies orthopnea, PND, or lower limb edema    Patient is Indonesian speaking only  Interview conducted through an   Stress echo was personally reviewed  Did not show inducible ischemia  However, baseline echo showed mild global hypokinesis and reduced function  Hospital Based Practices Required Assessment:   Pain Assessment   the patient states they do not have pain  (on a scale of 0 to 10, the patient rates the pain at 0 )    Prefered Language is  Vincentian  Primary Language is  Vincentian  Review of Systems  Cardiology Male ROS:     Cardiac: no chest pain, no rhythm problems and no signs of swelling  Skin: no rashes seen   Genitourinary: no frequent urination at night, no difficult urination and no kidney stones   Psychological: no anxiety and no panic attacks  General: no trouble sleeping, no appetite changes and no changes in weight  Respiratory: no cough/sputum, no wheezing and no hemoptysis  HEENT: no hearing problems and no nose problems   Gastrointestinal: no nausea, no vomiting and no diarrhea   Hematologic: no anemia   Neurological: no numbness, no tingling, no weakness, no headaches and no dizziness   Musculoskeletal: no back pain   ROS Reviewed:   ROS reviewed  Active Problems  Problems    1  Allergic rhinitis (477 9) (J30 9)   2  Bipolar disorder (296 80) (F31 9)   3   Cataract, left eye (366 9) (H26 9)   4  Chest pain (786 50) (R07 9)   5  Chronic low back pain (724 2,338 29) (M54 5,G89 29)   6  Constipation (564 00) (K59 00)   7  Epidermoid cyst of skin (706 2) (L72 0)   8  Glaucoma of both eyes (365 9) (H40 9)   9  Hyperlipidemia (272 4) (E78 5)   10  Hypothyroidism (244 9) (E03 9)   11  Left knee pain (719 46) (M25 562)   12  History of Need for prophylactic vaccination and inoculation against influenza (V04 81)    (Z23)   13  Pain in joint of left hip (719 45) (M25 552)   14  Right knee pain (719 46) (M25 561)    Past Medical History  Problems    1  History of Cerumen impaction (380 4) (H61 20)   2  History of Contusion of skin with intact surface (924 9) (T14 8)   3  History of upper respiratory infection (V12 09) (Z87 09)   4  History of Need for immunization against influenza (V04 81) (Z23)   5  History of Need for prophylactic vaccination and inoculation against influenza (V04 81)   (Z23)   6  Personal history of hyperthyroidism (V12 29) (Z86 39)   7  History of Shoulder joint pain, unspecified laterality   8  History of Sprain of ankle (845 00) (S93 409A)   9  History of Viral upper respiratory infection (465 9) (J06 9,B97 89)  Active Problems And Past Medical History Reviewed: The active problems and past medical history were reviewed and updated today  Surgical History  Problems    1  History of Cholecystectomy Laparoscopic   2  History of Eye Surgery  Surgical History Reviewed: The surgical history was reviewed and updated today  Family History  Mother    1  Family history of diabetes mellitus (V18 0) (Z83 3)   2  Family history of hyperlipidemia (V18 19) (Z83 49)   3  Family history of Thyroid disorder  Father    4  Family history of dementia (V17 2) (Z81 8)  Maternal Grandmother    5  Family history of CAD (coronary artery disease)   6  Family history of hypertension (V17 49) (Z82 49)   7  Family history of myocardial infarction (V17 3) (Z82 49)  Family History Reviewed:    The family history was reviewed and updated today  Social History  Problems    · Being A Social Drinker   · Marital History - Single   · Never A Smoker   · No drug use   · Non-smoker (V49 89) (Z78 9)   · Occasional caffeine consumption   · Sedentary Lifestyle (V69 0)  Social History Reviewed: The social history was reviewed and updated today  The social history was reviewed and is unchanged  Current Meds   1  Brimonidine Tartrate 0 15 % Ophthalmic Solution; INSTILL 1 DROP INTO BOTH EYES   TWICE DAILY; Therapy: (Recorded:08Oct2015) to Recorded   2  Docusate Sodium 100 MG Oral Capsule; TOME CORNELL CAPSULA POR VIA ORAL DOS   VECES AL SOPHY ASHELY FUERA NECESARIOTAKE ONE CAPSULE BY MOUTH TWICE   DAILY AS NEEDED; Therapy: 53OKX9092 to (Last Rx:19Apr2016)  Requested for: 19Apr2016 Ordered   3  Fluticasone Propionate 50 MCG/ACT Nasal Suspension; USE 2 SPRAYS IN EACH   NOSTRIL ONCE DAILY; Therapy: 69RWQ3035 to (Last Rx:17Ioa7848)  Requested for: 19NFH0608 Ordered   4  Latanoprost 0 005 % Ophthalmic Solution; INSTILL 1 DROP  INTO AFFECTED EYE(S)   ONCE DAILY AS DIRECTED; Therapy: (Recorded:08Oct2015) to Recorded   5  Levothyroxine Sodium 150 MCG Oral Tablet; TAKE ONE TABLET BY MOUTH ONCE A DAY; Therapy: 60Yar9335 to (Lena Situ)  Requested for: 62ZLI8692; Last   Rx:10Mar2016 Ordered   6  Loratadine 10 MG Oral Tablet; TAKE 1 TABLET DAILY; Therapy: 47VEO7313 to (Stevphen Young)  Requested for: 01ITQ1092; Last   Rx:08Oct2015 Ordered   7  Lovastatin 10 MG Oral Tablet; TAKE 1 TABLET AT BEDTIME; Therapy: 53YLE3191 to (Lena Situ)  Requested for: 69IUJ5022; Last   Rx:10Mar2016 Ordered   8  Meloxicam 15 MG Oral Tablet; TAKE 1 TABLET DAILY WITH FOOD; Therapy: 51LDE6904 to (Evaluate:60Upu1333)  Requested for: 20Apr2016; Last   Rx:20Apr2016 Ordered   9  Polyethylene Glycol 3350 Oral Powder; MIX 1 CAPFUL (17GM) IN 8 OUNCES OF WATER,   JUICE, OR TEA AND DRINK DAILY;    Therapy: 00Mqy5403 to (Ena Sires)  Requested for: 58Oye1140; Last   Rx:08Oct2015 Ordered   10  SEROquel 100 MG Oral Tablet; Therapy: [phone] to Recorded   11  Timolol Maleate 0 5 % Ophthalmic Solution; pt takes 1 gtt into both eyes every AM;    Therapy: (Recorded:08Oct2015) to Recorded   12  TraMADol HCl - 50 MG Oral Tablet; 1-2 tabs po q 4-6 hrs prn; Therapy: 08Apr2015 to (Last Rx:02Nov2015) Ordered   13  TraZODone HCl - 150 MG Oral Tablet; Therapy: [phone] to Recorded  Medication List Reviewed: The medication list was reviewed and updated today  Allergies  Medication    1  No Known Drug Allergies  Non-Medication    2  Pollen   3  Ragweed    Vitals  Vital Signs [Data Includes: Current Encounter]    Recorded: 22Apr2016 11:19AM   Temperature 97 9 F   Heart Rate 84   Systolic 92   Diastolic 50   Height 5 ft 6 in   Weight 154 lb 5 14 oz   BMI Calculated 24 91   BSA Calculated 1 79     Physical Exam    Constitutional   General appearance: No acute distress, well appearing and well nourished  Eyes   Conjunctiva and Sclera examination: Conjunctiva pink, sclera anicteric  Ears, Nose, Mouth, and Throat - External inspection of ears and nose: Normal without deformities or discharge  Neck   Neck and thyroid: Normal, supple, trachea midline, no thyromegaly  Pulmonary   Respiratory effort: No increased work of breathing or signs of respiratory distress  Auscultation of lungs: Clear to auscultation, no rales, no rhonchi, no wheezing, good air movement  Cardiovascular   Auscultation of heart: Normal rate and rhythm, normal S1 and S2, no murmurs  Pedal pulses: Normal, 2+ bilaterally  Examination of extremities for edema and/or varicosities: Normal     Chest - Chest: Normal    Abdomen   Abdomen: Non-tender and no distention  Liver and spleen: No hepatomegaly or splenomegaly  Musculoskeletal Gait and station: Normal gait     Skin - Skin and subcutaneous tissue: Normal without rashes or lesions  Skin is warm and well perfused, normal turgor  Neurologic - Cranial nerves: II - XII intact  Psychiatric - Orientation to person, place, and time: Normal  Mood and affect: Normal       Health Management  Health Maintenance   COLONOSCOPY; every 10 years; Last 88Hhj7746; Next Due: 79Qea1590; Active    Future Appointments    Date/Time Provider Specialty Site   05/05/2016 12:40 PM Lissette Romero PCP   05/06/2016 09:00 AM Dilshad Justice MD Cardiology [address]     Signatures   Electronically signed by : Shekhar Robertson MD; Apr 29 2016  9:18AM EST                       (Author)    Electronically signed by : GINGER Gaston ; May  3 2016  3:25PM EST

## 2018-01-17 NOTE — PROGRESS NOTES
Assessment    1  Allergic rhinitis (477 9) (J30 9)   2  Constipation (564 00) (K59 00)   3  Hypothyroidism (244 9) (E03 9)    Plan  Allergic rhinitis    · Alaway 0 025 % Ophthalmic Solution; INSTILL 1 DROP IN THE AFFECTED EYE(S) EVERY 12  HOURS AS NEEDED   · Fluticasone Propionate 50 MCG/ACT Nasal Suspension; USE 2 SPRAYS IN EACH NOSTRIL  ONCE DAILY   · Loratadine 10 MG Oral Tablet; TAKE 1 TABLET DAILY  Cataract, left eye, Glaucoma of both eyes    · Brimonidine Tartrate 0 15 % Ophthalmic Solution; INSTILL 1 DROP INTO BOTH EYES TWICE  DAILY   · Latanoprost 0 005 % Ophthalmic Solution; INSTILL 1 DROP  INTO AFFECTED EYE(S) ONCE  DAILY AS DIRECTED   · Timolol Maleate 0 5 % Ophthalmic Solution; pt takes 1 gtt into both eyes every AM  Chronic low back pain, PMH: Left knee pain, Pain in joint of left hip, PMH: Right knee pain    · TraMADol HCl - 50 MG Oral Tablet; 1-2 tabs po q 4-6 hrs prn  Constipation    · Docusate Sodium 100 MG Oral Capsule; TAKE 1 CAPSULE TWICE DAILY AS NEEDED  Hyperlipidemia    · Lovastatin 10 MG Oral Tablet; TAKE 1 TABLET AT BEDTIME  Hypothyroidism    · Polyethylene Glycol 3350 Oral Powder; MIX 1 CAPFUL (17GM) IN 8 OUNCES OF WATER,  JUICE, OR TEA AND DRINK DAILY   · Levothyroxine Sodium 150 MCG Oral Tablet; TAKE ONE TABLET BY MOUTH ONCE A DAY   · (1) COMPREHENSIVE METABOLIC PANEL; Status:Active; Requested for:02Zeh6197;    · (1) LIPID PANEL, FASTING; Status:Active; Requested for:09Lza5061;    · (1) TSH; Status:Active; Requested for:10Ech7432;    · (1) VITAMIN D 25-HYDROXY; Status:Active;  Requested for:33Fzl3976;   PMH: Left knee pain    · Meloxicam 15 MG Oral Tablet; TAKE 1 TABLET DAILY WITH FOOD  Unlinked    · SEROquel 100 MG Oral Tablet (QUEtiapine Fumarate)   · TraZODone HCl - 150 MG Oral Tablet    Discussion/Summary  Discussion Summary:   Get labs done today/ nonfasting  I'll call if abnormal  constipation improved with colace and miralax  no other med changes until sees labs  f/u with me every 6 months (11/2016  trial rx: Alaway eye drops for his allergy eyes/ only if needed, pt advised to also check with his eye doctor if OK, since he's taking other eye drops  Chief Complaint  Chief Complaint Free Text Note Form: pt was seen in ER 4/17/16 for constipation- no BM x 6 days, they gave him Miralax and Colace pills which he says are working for him  seen by cardiology 5/6/16 - NO cardiomegaly, no ischemic heart disease   Chief Complaint Chronic Condition  Purnima Amber: Patient is here today for follow up of chronic conditions described in HPI  History of Present Illness  HPI: as above  pt needs more "thyroid pills"      Review of Systems  Complete-Male:   Constitutional: as noted in HPI  Eyes: exopthalmus, but as noted in HPI    ENT: no complaints of earache, no hearing loss, no nosebleeds, no nasal discharge, no sore throat, no hoarseness  Cardiovascular: as noted in HPI  Respiratory: No complaints of shortness of breath, no wheezing, no cough, no SOB on exertion, no orthopnea or PND  Gastrointestinal: constipation and he states wth taking meds, he now has BM 2x/day, but as noted in HPI and no blood in stools  Genitourinary: No complaints of dysuria, no incontinence, no hesitancy, no nocturia, no genital lesion, no testicular pain  Musculoskeletal: No complaints of arthralgia, no myalgias, no joint swelling or stiffness, no limb pain or swelling  Psychiatric: bipolar- Dr Renetta Collazo, but as noted in HPI  Endocrine: as noted in HPI  ROS Reviewed:   ROS reviewed  Active Problems    1  Allergic rhinitis (477 9) (J30 9)   2  Bipolar disorder (296 80) (F31 9)   3  Cardiomyopathy (425 4) (I42 9)   4  Cataract, left eye (366 9) (H26 9)   5  Chronic low back pain (724 2,338 29) (M54 5,G89 29)   6  Constipation (564 00) (K59 00)   7  Epidermoid cyst of skin (706 2) (L72 0)   8  Glaucoma of both eyes (365 9) (H40 9)   9  Hyperlipidemia (272 4) (E78 5)   10   Hypothyroidism (244 9) (E03 9)   11  History of Need for prophylactic vaccination and inoculation against influenza (V04 81) (Z23)   12  Pain in joint of left hip (719 45) (M25 552)    Past Medical History    1  History of Cerumen impaction (380 4) (H61 20)   2  History of Contusion of skin with intact surface (924 9) (T14 8)   3  History of chest pain (V13 89) (Z87 898)   4  History of upper respiratory infection (V12 09) (Z87 09)   5  History of Left knee pain (719 46) (M25 562)   6  History of Need for immunization against influenza (V04 81) (Z23)   7  History of Need for prophylactic vaccination and inoculation against influenza (V04 81) (Z23)   8  Personal history of hyperthyroidism (V12 29) (Z86 39)   9  History of Right knee pain (719 46) (M25 561)   10  History of Screening for breast cancer (V76 10) (Z12 39)   11  History of Shoulder joint pain, unspecified laterality   12  History of Sprain of ankle (845 00) (S93 409A)   13  History of Viral upper respiratory infection (465 9) (J06 9,B97 89)    Surgical History    1  History of Cholecystectomy Laparoscopic   2  History of Eye Surgery    Family History  Mother    1  Family history of diabetes mellitus (V18 0) (Z83 3)   2  Family history of hyperlipidemia (V18 19) (Z83 49)   3  Family history of Thyroid disorder  Father    4  Family history of dementia (V17 2) (Z81 8)  Maternal Grandmother    5  Family history of CAD (coronary artery disease)   6  Family history of hypertension (V17 49) (Z82 49)   7  Family history of myocardial infarction (V17 3) (Z82 49)    Social History    · Being A Social Drinker   · Marital History - Single   · Never A Smoker   · No drug use   · Non-smoker (V49 89) (Z78 9)   · Occasional caffeine consumption   · Sedentary Lifestyle (V69 0)    Current Meds   1  Brimonidine Tartrate 0 15 % Ophthalmic Solution; INSTILL 1 DROP INTO BOTH EYES TWICE DAILY; Therapy: (Recorded:76Ogn4481) to Recorded   2   Docusate Sodium 100 MG Oral Capsule; TOME CORNELL CAPSULA POR VIA ORAL DOS VECES AL SOPHY   ASHELY FUERA NECESARIOTAKE ONE CAPSULE BY MOUTH TWICE DAILY AS NEEDED; Therapy: 09MCA0447 to (Last Rx:19Apr2016)  Requested for: 19Apr2016 Ordered   3  Fluticasone Propionate 50 MCG/ACT Nasal Suspension; USE 2 SPRAYS IN EACH NOSTRIL ONCE   DAILY; Therapy: 56GSK5121 to (Last Rx:08Oct2015)  Requested for: 20RCP0370 Ordered   4  Latanoprost 0 005 % Ophthalmic Solution; INSTILL 1 DROP  INTO AFFECTED EYE(S) ONCE DAILY AS   DIRECTED; Therapy: (Recorded:87Pnm8388) to Recorded   5  Levothyroxine Sodium 150 MCG Oral Tablet; TAKE ONE TABLET BY MOUTH ONCE A DAY; Therapy: 05Yhh8763 to (Scott Wade)  Requested for: 36LGB2621; Last Rx:10Mar2016   Ordered   6  Loratadine 10 MG Oral Tablet; TAKE 1 TABLET DAILY; Therapy: 88QMD1991 to (Morgan Barillas)  Requested for: 12LYM1308; Last Rx:08Oct2015   Ordered   7  Lovastatin 10 MG Oral Tablet; TAKE 1 TABLET AT BEDTIME; Therapy: 66FTY4880 to (Scott Wade)  Requested for: 24OAL0667; Last Rx:10Mar2016   Ordered   8  Meloxicam 15 MG Oral Tablet; TAKE 1 TABLET DAILY WITH FOOD; Therapy: 13YDF2117 to (Evaluate:12Xqd5855)  Requested for: 20Apr2016; Last Rx:20Apr2016   Ordered   9  Polyethylene Glycol 3350 Oral Powder; MIX 1 CAPFUL (17GM) IN 8 OUNCES OF WATER, JUICE, OR   TEA AND DRINK DAILY; Therapy: 34Ccb5014 to (Morgan Barillas)  Requested for: 34Aaq2950; Last Rx:08Oct2015   Ordered   10  SEROquel 100 MG Oral Tablet; Therapy: (Recorded:26Ykh1409) to Recorded   11  Timolol Maleate 0 5 % Ophthalmic Solution; pt takes 1 gtt into both eyes every AM;    Therapy: (Recorded:96Pks9188) to Recorded   12  TraMADol HCl - 50 MG Oral Tablet; 1-2 tabs po q 4-6 hrs prn; Therapy: 08Apr2015 to (Last Rx:96Jwe3381) Ordered   13  TraZODone HCl - 150 MG Oral Tablet; Therapy: (Recorded:31Nim3060) to Recorded    Allergies    1  No Known Drug Allergies    2  Pollen   3   Ragweed    Vitals  Vital Signs [Data Includes: Current Encounter] Recorded: 97MHE9891 11:18AM   Temperature 97 9 F   Heart Rate 88   Systolic 217   Diastolic 60   Height 5 ft 6 in   Weight 153 lb 14 08 oz   BMI Calculated 24 84   BSA Calculated 1 79     Physical Exam    Constitutional   General appearance: Abnormal   thin hisp male with round belly and bulging red eyes  Eyes   Conjunctiva and lids: Abnormal   lucila exopthalmos  Ears, Nose, Mouth, and Throat   External inspection of ears and nose: Normal     Otoscopic examination: Tympanic membrance translucent with normal light reflex  Canals patent without erythema  Nasal mucosa, septum, and turbinates: Normal without edema or erythema  Oropharynx: Normal with no erythema, edema, exudate or lesions  Pulmonary   Respiratory effort: No increased work of breathing or signs of respiratory distress  Auscultation of lungs: Clear to auscultation, equal breath sounds bilaterally, no wheezes, no rales, no rhonci  Cardiovascular   Auscultation of heart: Normal rate and rhythm, normal S1 and S2, without murmurs  Abdomen   Abdomen: Abnormal   rounded soft NTTP  Liver and spleen: No hepatomegaly or splenomegaly  Lymphatic   Palpation of lymph nodes in neck: No lymphadenopathy  Skin   Skin and subcutaneous tissue: Normal without rashes or lesions  Neurologic   Sensation: No sensory loss  Psychiatric   Orientation to person, place and time: Normal     Mood and affect: Normal          Health Management  Encounter for preventive health examination   COLONOSCOPY; every 10 years; Last 76Pho6897; Next Due: 69Gld9378; Active    Attending Note  Collaborating Physician Note: Collaborating Physician: I supervised the Advanced Practitioner and I agree with the Advanced Practitioner note        Signatures   Electronically signed by : Sergio Richardson ; May 17 2016 11:57AM EST                       (Author)    Electronically signed by : Sergio Richardson ; May 17 2016 12:25PM EST                       (Author) Electronically signed by : Urmila Graham DO; May 17 2016  9:31PM EST                       (Review)

## 2018-01-24 VITALS
DIASTOLIC BLOOD PRESSURE: 70 MMHG | TEMPERATURE: 97.5 F | BODY MASS INDEX: 26.86 KG/M2 | HEIGHT: 68 IN | WEIGHT: 177.25 LBS | HEART RATE: 100 BPM | SYSTOLIC BLOOD PRESSURE: 90 MMHG

## 2018-05-03 ENCOUNTER — TELEPHONE (OUTPATIENT)
Dept: INTERNAL MEDICINE CLINIC | Facility: CLINIC | Age: 53
End: 2018-05-03

## 2018-05-03 NOTE — TELEPHONE ENCOUNTER
CHEST PAIN FOR 4 DAYS HE IS FEELING LIKE A BURNING SENSATION AND HE SAID THE PAIN GIVE HIM HEADACHES AND HE TRYING TO REST BUT WHEN HE WAKE UP HE STILL WITH THE PAIN,NO PALPITATION NO DIZZINESS,HE IS TAKING ASPIRIN BUT HE STILL WITH THE PAIN AND THE BURNING ,PER KHAI THE NURSES I SCHEDULED PATIENT FOR APPOINTMENT WITH PCP LACY MCLEAN  TOMORROW

## 2018-05-04 ENCOUNTER — OFFICE VISIT (OUTPATIENT)
Dept: INTERNAL MEDICINE CLINIC | Facility: CLINIC | Age: 53
End: 2018-05-04
Payer: COMMERCIAL

## 2018-05-04 VITALS
BODY MASS INDEX: 27.53 KG/M2 | DIASTOLIC BLOOD PRESSURE: 70 MMHG | HEIGHT: 68 IN | SYSTOLIC BLOOD PRESSURE: 118 MMHG | HEART RATE: 84 BPM | TEMPERATURE: 97.6 F | WEIGHT: 181.66 LBS

## 2018-05-04 DIAGNOSIS — K43.9 HERNIA OF ABDOMINAL WALL: ICD-10-CM

## 2018-05-04 DIAGNOSIS — R07.9 CHEST PAIN, UNSPECIFIED TYPE: Primary | ICD-10-CM

## 2018-05-04 DIAGNOSIS — K59.00 CONSTIPATION, UNSPECIFIED CONSTIPATION TYPE: ICD-10-CM

## 2018-05-04 PROCEDURE — 99213 OFFICE O/P EST LOW 20 MIN: CPT | Performed by: INTERNAL MEDICINE

## 2018-05-04 PROCEDURE — 93000 ELECTROCARDIOGRAM COMPLETE: CPT | Performed by: INTERNAL MEDICINE

## 2018-05-04 RX ORDER — NAPROXEN 500 MG/1
250 TABLET ORAL 2 TIMES DAILY WITH MEALS
Qty: 60 TABLET | Refills: 0 | Status: SHIPPED | OUTPATIENT
Start: 2018-05-04 | End: 2018-05-06

## 2018-05-04 NOTE — PROGRESS NOTES
INTERNAL MEDICINE FOLLOW-UP OFFICE VISIT  [facility]  [address], [address]    NAME: Bravo Singh  AGE: 47 y o  SEX: male    DATE OF ENCOUNTER: 5/4/2018    Assessment and Plan     Diagnoses and all orders for this visit:    Chest pain, unspecified type  -     POCT ECG      1  Chest pain, unspecified type  POCT ECG       Orders Placed This Encounter   Procedures    POCT ECG     1  Left sided chest pain - Does not appear cardiac  Not exertional and no history of Diabetes, HTN, CAD, family hx of CAD, or smoking  Appears musculoskeletal as pain is pin point and worsens when patient sleeps on that side  Will do POCT EKG and provide patient with Naproxen for pain relief  2  Constipation - Patient reports 3 day history of constipation with straining  No blood in stool  Also notes abdominal distention  Pt notes improvement with miralax  Will tell patient to continue to use miralax prn for contipation and will monitor abdominal distention clinically  Patient scheduled next for colonoscopy in 2022      - Counseling Documentation: patient was counseled regarding: diagnostic results, instructions for management, risk factor reductions, prognosis, patient and family education, impressions, risks and benefits of treatment options and importance of compliance with treatment    Chief Complaint     Chief Complaint   Patient presents with    Chest Pain       History of Present Illness     Chest Pain    This is a new problem  The current episode started in the past 7 days  The onset quality is sudden  The problem occurs intermittently  The problem has been unchanged  The pain is present in the lateral region  The pain is at a severity of 6/10  The pain is moderate  The quality of the pain is described as sharp  The pain does not radiate   Pertinent negatives include no abdominal pain, back pain, cough, diaphoresis, dizziness, fever, headaches, nausea, numbness, orthopnea, palpitations, shortness of breath or vomiting  The pain is aggravated by lifting arm  He has tried analgesics for the symptoms  Pertinent negatives for past medical history include no CAD, no COPD, no hypertension, no MI, no PE and no seizures  The following portions of the patient's history were reviewed and updated as appropriate: allergies, current medications, past family history, past medical history, past social history, past surgical history and problem list     Review of Systems     Review of Systems   Constitutional: Negative for appetite change, chills, diaphoresis, fatigue, fever and unexpected weight change  HENT: Negative for congestion, rhinorrhea and sore throat  Eyes: Negative for photophobia and visual disturbance  Respiratory: Negative for cough, shortness of breath and wheezing  Cardiovascular: Positive for chest pain  Negative for palpitations, orthopnea and leg swelling  Gastrointestinal: Negative for abdominal pain, anal bleeding, blood in stool, constipation, diarrhea, nausea and vomiting  Genitourinary: Negative for decreased urine volume, difficulty urinating, dysuria, flank pain, frequency, hematuria and urgency  Musculoskeletal: Negative for arthralgias, back pain, joint swelling and myalgias  Skin: Negative for color change and rash  Neurological: Negative for dizziness, seizures, facial asymmetry, speech difficulty, numbness and headaches  Psychiatric/Behavioral: Negative for agitation, confusion and decreased concentration  The patient is not nervous/anxious  Active Problem List     Patient Active Problem List   Diagnosis    Constipation    Chest pain       Objective     /70   Pulse 84   Temp 97 6 °F (36 4 °C)   Ht 5' 8" (1 727 m)   Wt 82 4 kg (181 lb 10 5 oz)   BMI 27 62 kg/m²     Physical Exam   Constitutional: He is oriented to person, place, and time  He appears well-developed and well-nourished  No distress     HENT: Head: Normocephalic and atraumatic  Nose: Nose normal    Mouth/Throat: Oropharynx is clear and moist  No oropharyngeal exudate  Eyes: EOM are normal  Pupils are equal, round, and reactive to light  Right eye exhibits no discharge  Left eye exhibits no discharge  No scleral icterus  Neck: Normal range of motion  Neck supple  No JVD present  Cardiovascular: Normal rate, regular rhythm, normal heart sounds and intact distal pulses  Exam reveals no gallop and no friction rub  No murmur heard  Pulmonary/Chest: Effort normal and breath sounds normal  No respiratory distress  He has no wheezes  He has no rales  He exhibits no tenderness  Abdominal: Soft  Bowel sounds are normal  He exhibits no distension and no mass  There is no tenderness  There is no rebound and no guarding  Musculoskeletal: Normal range of motion  He exhibits no edema, tenderness or deformity  Lymphadenopathy:     He has no cervical adenopathy  Neurological: He is alert and oriented to person, place, and time  He has normal reflexes  Skin: Skin is warm and dry  No rash noted  He is not diaphoretic  No erythema  No pallor  Psychiatric: He has a normal mood and affect   His behavior is normal        Pertinent Laboratory/Diagnostic Studies:  CBC:   Lab Results   Component Value Date/Time    WBC 5 36 12/05/2017 09:02 AM    WBC 8 24 09/09/2015 09:22 PM    RBC 5 24 12/05/2017 09:02 AM    RBC 5 65 (H) 09/09/2015 09:22 PM    HGB 14 7 12/05/2017 09:02 AM    HGB 15 8 09/09/2015 09:22 PM    HCT 44 8 12/05/2017 09:02 AM    HCT 46 8 09/09/2015 09:22 PM    MCV 86 12/05/2017 09:02 AM    MCV 83 09/09/2015 09:22 PM    MCH 28 1 12/05/2017 09:02 AM    MCH 28 0 09/09/2015 09:22 PM    MCHC 32 8 12/05/2017 09:02 AM    MCHC 33 8 09/09/2015 09:22 PM    RDW 14 2 12/05/2017 09:02 AM    RDW 14 4 09/09/2015 09:22 PM    MPV 10 8 12/05/2017 09:02 AM    MPV 10 5 09/09/2015 09:22 PM     12/05/2017 09:02 AM     09/09/2015 09:22 PM    NRBC 0 12/05/2017 09:02 AM    NEUTOPHILPCT 50 12/05/2017 09:02 AM    NEUTOPHILPCT 71 09/09/2015 09:22 PM    LYMPHOPCT 34 12/05/2017 09:02 AM    LYMPHOPCT 20 09/09/2015 09:22 PM    MONOPCT 10 12/05/2017 09:02 AM    MONOPCT 8 09/09/2015 09:22 PM    EOSPCT 5 12/05/2017 09:02 AM    EOSPCT 1 09/09/2015 09:22 PM    BASOPCT 1 12/05/2017 09:02 AM    BASOPCT 0 05/16/2014 09:15 AM    NEUTROABS 2 67 12/05/2017 09:02 AM    NEUTROABS 5 85 09/09/2015 09:22 PM    LYMPHSABS 1 84 12/05/2017 09:02 AM    LYMPHSABS 1 65 09/09/2015 09:22 PM    MONOSABS 0 52 12/05/2017 09:02 AM    MONOSABS 0 66 09/09/2015 09:22 PM    EOSABS 0 27 12/05/2017 09:02 AM    EOSABS 0 08 09/09/2015 09:22 PM     CBC:   Results from last 6 Months  Lab Units 12/05/17  0902   WBC Thousand/uL 5 36   RBC Million/uL 5 24   HEMOGLOBIN g/dL 14 7   HEMATOCRIT % 44 8   MCV fL 86   MCH pg 28 1   MCHC g/dL 32 8   RDW % 14 2   MPV fL 10 8   PLATELETS Thousands/uL 180   NRBC AUTO /100 WBCs 0   NEUTROS PCT % 50   LYMPHS PCT % 34   MONOS PCT % 10   EOS PCT % 5   BASOS PCT % 1   NEUTROS ABS Thousands/µL 2 67   LYMPHS ABS Thousands/µL 1 84   MONOS ABS Thousand/µL 0 52   EOS ABS Thousand/µL 0 27       Current Medications     Current Outpatient Prescriptions:     docusate sodium (COLACE) 100 mg capsule, Take 1 capsule by mouth 2 (two) times a day, Disp: 30 capsule, Rfl: 0    levothyroxine 125 mcg tablet, Take 125 mcg by mouth daily  , Disp: , Rfl:     QUEtiapine (SEROQUEL) 100 mg tablet, Take 100 mg by mouth daily at bedtime  , Disp: , Rfl:     traZODone (DESYREL) 100 mg tablet, Take 100 mg by mouth daily at bedtime  , Disp: , Rfl:     loratadine (CLARITIN) 10 mg tablet, Take 10 mg by mouth daily  , Disp: , Rfl:     polyethylene glycol (GLYCOLAX) powder, Take 17 g by mouth daily  , Disp: 119 g, Rfl: 0    polyethylene glycol (GLYCOLAX) powder, Take 17 g by mouth daily, Disp: 119 g, Rfl: 0    Health Maintenance     Health Maintenance   Topic Date Due    HIV SCREENING  1965    Hepatitis C Screening  1965    PNEUMOCOCCAL POLYSACCHARIDE VACCINE AGE 2-64 HIGH RISK  07/05/1966    Depression Screening PHQ-9  07/05/1976    INFLUENZA VACCINE  09/01/2018    COLONOSCOPY  12/12/2022    DTaP,Tdap,and Td Vaccines (3 - Td) 07/31/2026     Immunization History   Administered Date(s) Administered    Influenza Quadrivalent Preservative Free 3 years and older IM 10/03/2016    Influenza Quadrivalent, 6-35 Months IM 12/14/2017    Influenza TIV (IM) 11/07/2013, 11/04/2014    Tdap 11/07/2013, 07/31/2016       Brady HUTCHISON    Internal Medicine PGY-1  5/4/2018 12:21 PM

## 2018-05-06 ENCOUNTER — HOSPITAL ENCOUNTER (OUTPATIENT)
Facility: HOSPITAL | Age: 53
Setting detail: OBSERVATION
Discharge: HOME | End: 2018-05-07
Attending: EMERGENCY MEDICINE | Admitting: INTERNAL MEDICINE
Payer: COMMERCIAL

## 2018-05-06 ENCOUNTER — APPOINTMENT (EMERGENCY)
Dept: RADIOLOGY | Facility: HOSPITAL | Age: 53
End: 2018-05-06
Payer: COMMERCIAL

## 2018-05-06 DIAGNOSIS — R07.9 CHEST PAIN: Primary | ICD-10-CM

## 2018-05-06 DIAGNOSIS — K21.9 GASTROESOPHAGEAL REFLUX DISEASE, ESOPHAGITIS PRESENCE NOT SPECIFIED: ICD-10-CM

## 2018-05-06 PROBLEM — R07.89 ATYPICAL CHEST PAIN: Status: ACTIVE | Noted: 2018-05-04

## 2018-05-06 PROBLEM — R10.13 EPIGASTRIC PAIN: Status: ACTIVE | Noted: 2018-05-06

## 2018-05-06 LAB
ALBUMIN SERPL BCP-MCNC: 3.7 G/DL (ref 3.5–5)
ALP SERPL-CCNC: 73 U/L (ref 46–116)
ALT SERPL W P-5'-P-CCNC: 42 U/L (ref 12–78)
ANION GAP SERPL CALCULATED.3IONS-SCNC: 5 MMOL/L (ref 4–13)
AST SERPL W P-5'-P-CCNC: 22 U/L (ref 5–45)
BASOPHILS # BLD AUTO: 0.02 THOUSANDS/ΜL (ref 0–0.1)
BASOPHILS NFR BLD AUTO: 0 % (ref 0–1)
BILIRUB SERPL-MCNC: 0.45 MG/DL (ref 0.2–1)
BUN SERPL-MCNC: 12 MG/DL (ref 5–25)
CALCIUM SERPL-MCNC: 8.7 MG/DL (ref 8.3–10.1)
CHLORIDE SERPL-SCNC: 106 MMOL/L (ref 100–108)
CO2 SERPL-SCNC: 29 MMOL/L (ref 21–32)
CREAT SERPL-MCNC: 1.13 MG/DL (ref 0.6–1.3)
EOSINOPHIL # BLD AUTO: 0.21 THOUSAND/ΜL (ref 0–0.61)
EOSINOPHIL NFR BLD AUTO: 3 % (ref 0–6)
ERYTHROCYTE [DISTWIDTH] IN BLOOD BY AUTOMATED COUNT: 13.5 % (ref 11.6–15.1)
GFR SERPL CREATININE-BSD FRML MDRD: 74 ML/MIN/1.73SQ M
GLUCOSE SERPL-MCNC: 101 MG/DL (ref 65–140)
HCT VFR BLD AUTO: 48.1 % (ref 36.5–49.3)
HGB BLD-MCNC: 15.8 G/DL (ref 12–17)
LIPASE SERPL-CCNC: 142 U/L (ref 73–393)
LYMPHOCYTES # BLD AUTO: 1.95 THOUSANDS/ΜL (ref 0.6–4.47)
LYMPHOCYTES NFR BLD AUTO: 25 % (ref 14–44)
MCH RBC QN AUTO: 28.7 PG (ref 26.8–34.3)
MCHC RBC AUTO-ENTMCNC: 32.8 G/DL (ref 31.4–37.4)
MCV RBC AUTO: 88 FL (ref 82–98)
MONOCYTES # BLD AUTO: 0.5 THOUSAND/ΜL (ref 0.17–1.22)
MONOCYTES NFR BLD AUTO: 6 % (ref 4–12)
NEUTROPHILS # BLD AUTO: 5.11 THOUSANDS/ΜL (ref 1.85–7.62)
NEUTS SEG NFR BLD AUTO: 66 % (ref 43–75)
NRBC BLD AUTO-RTO: 0 /100 WBCS
PLATELET # BLD AUTO: 198 THOUSANDS/UL (ref 149–390)
PLATELET # BLD AUTO: 199 THOUSANDS/UL (ref 149–390)
PMV BLD AUTO: 10.2 FL (ref 8.9–12.7)
PMV BLD AUTO: 9.9 FL (ref 8.9–12.7)
POTASSIUM SERPL-SCNC: 3.7 MMOL/L (ref 3.5–5.3)
PROT SERPL-MCNC: 7.4 G/DL (ref 6.4–8.2)
RBC # BLD AUTO: 5.5 MILLION/UL (ref 3.88–5.62)
SODIUM SERPL-SCNC: 140 MMOL/L (ref 136–145)
TROPONIN I SERPL-MCNC: <0.02 NG/ML
TROPONIN I SERPL-MCNC: <0.02 NG/ML
WBC # BLD AUTO: 7.83 THOUSAND/UL (ref 4.31–10.16)

## 2018-05-06 PROCEDURE — 80053 COMPREHEN METABOLIC PANEL: CPT | Performed by: EMERGENCY MEDICINE

## 2018-05-06 PROCEDURE — 83690 ASSAY OF LIPASE: CPT | Performed by: EMERGENCY MEDICINE

## 2018-05-06 PROCEDURE — 85049 AUTOMATED PLATELET COUNT: CPT | Performed by: INTERNAL MEDICINE

## 2018-05-06 PROCEDURE — 93005 ELECTROCARDIOGRAM TRACING: CPT | Performed by: EMERGENCY MEDICINE

## 2018-05-06 PROCEDURE — 84484 ASSAY OF TROPONIN QUANT: CPT | Performed by: EMERGENCY MEDICINE

## 2018-05-06 PROCEDURE — 71046 X-RAY EXAM CHEST 2 VIEWS: CPT

## 2018-05-06 PROCEDURE — 74177 CT ABD & PELVIS W/CONTRAST: CPT

## 2018-05-06 PROCEDURE — 85025 COMPLETE CBC W/AUTO DIFF WBC: CPT | Performed by: EMERGENCY MEDICINE

## 2018-05-06 PROCEDURE — 36415 COLL VENOUS BLD VENIPUNCTURE: CPT | Performed by: EMERGENCY MEDICINE

## 2018-05-06 PROCEDURE — 84484 ASSAY OF TROPONIN QUANT: CPT | Performed by: INTERNAL MEDICINE

## 2018-05-06 PROCEDURE — 99285 EMERGENCY DEPT VISIT HI MDM: CPT

## 2018-05-06 RX ORDER — ACETAMINOPHEN 325 MG/1
650 TABLET ORAL EVERY 6 HOURS PRN
Status: DISCONTINUED | OUTPATIENT
Start: 2018-05-06 | End: 2018-05-07 | Stop reason: HOSPADM

## 2018-05-06 RX ORDER — TIMOLOL MALEATE 5 MG/ML
1 SOLUTION/ DROPS OPHTHALMIC EVERY MORNING
Status: DISCONTINUED | OUTPATIENT
Start: 2018-05-07 | End: 2018-05-07 | Stop reason: HOSPADM

## 2018-05-06 RX ORDER — POLYETHYLENE GLYCOL 3350 17 G/17G
17 POWDER, FOR SOLUTION ORAL DAILY PRN
Status: DISCONTINUED | OUTPATIENT
Start: 2018-05-06 | End: 2018-05-07 | Stop reason: HOSPADM

## 2018-05-06 RX ORDER — MELATONIN
2000 DAILY
Status: DISCONTINUED | OUTPATIENT
Start: 2018-05-07 | End: 2018-05-07 | Stop reason: HOSPADM

## 2018-05-06 RX ORDER — POLYETHYLENE GLYCOL 3350 17 G/17G
17 POWDER, FOR SOLUTION ORAL DAILY PRN
Status: DISCONTINUED | OUTPATIENT
Start: 2018-05-06 | End: 2018-05-06

## 2018-05-06 RX ORDER — ASPIRIN 81 MG/1
81 TABLET ORAL DAILY
COMMUNITY
End: 2019-03-21 | Stop reason: SDUPTHER

## 2018-05-06 RX ORDER — PRAVASTATIN SODIUM 10 MG
10 TABLET ORAL
Status: DISCONTINUED | OUTPATIENT
Start: 2018-05-07 | End: 2018-05-07 | Stop reason: HOSPADM

## 2018-05-06 RX ORDER — LOVASTATIN 10 MG/1
1 TABLET ORAL DAILY
COMMUNITY
Start: 2014-06-19 | End: 2018-09-14 | Stop reason: SDUPTHER

## 2018-05-06 RX ORDER — PANTOPRAZOLE SODIUM 20 MG/1
20 TABLET, DELAYED RELEASE ORAL
Status: DISCONTINUED | OUTPATIENT
Start: 2018-05-06 | End: 2018-05-07 | Stop reason: HOSPADM

## 2018-05-06 RX ORDER — DOCUSATE SODIUM 100 MG/1
100 CAPSULE, LIQUID FILLED ORAL 2 TIMES DAILY
Status: DISCONTINUED | OUTPATIENT
Start: 2018-05-06 | End: 2018-05-07 | Stop reason: HOSPADM

## 2018-05-06 RX ORDER — MAGNESIUM HYDROXIDE/ALUMINUM HYDROXICE/SIMETHICONE 120; 1200; 1200 MG/30ML; MG/30ML; MG/30ML
30 SUSPENSION ORAL EVERY 4 HOURS PRN
Status: DISCONTINUED | OUTPATIENT
Start: 2018-05-06 | End: 2018-05-07 | Stop reason: HOSPADM

## 2018-05-06 RX ORDER — ACETAMINOPHEN 325 MG/1
650 TABLET ORAL EVERY 6 HOURS PRN
COMMUNITY
End: 2019-03-21 | Stop reason: SDUPTHER

## 2018-05-06 RX ORDER — LATANOPROST 50 UG/ML
1 SOLUTION/ DROPS OPHTHALMIC DAILY
COMMUNITY

## 2018-05-06 RX ORDER — LORATADINE 10 MG/1
10 TABLET ORAL DAILY
Status: DISCONTINUED | OUTPATIENT
Start: 2018-05-07 | End: 2018-05-07 | Stop reason: HOSPADM

## 2018-05-06 RX ORDER — ACETAMINOPHEN 160 MG
1 TABLET,DISINTEGRATING ORAL DAILY
COMMUNITY
Start: 2017-03-16 | End: 2019-03-21 | Stop reason: SDUPTHER

## 2018-05-06 RX ORDER — LORAZEPAM 1 MG/1
1 TABLET ORAL DAILY
COMMUNITY

## 2018-05-06 RX ORDER — FLUTICASONE PROPIONATE 50 MCG
1 SPRAY, SUSPENSION (ML) NASAL DAILY
COMMUNITY
End: 2018-12-20 | Stop reason: SDUPTHER

## 2018-05-06 RX ORDER — TRAZODONE HYDROCHLORIDE 100 MG/1
100 TABLET ORAL
Status: DISCONTINUED | OUTPATIENT
Start: 2018-05-06 | End: 2018-05-07 | Stop reason: HOSPADM

## 2018-05-06 RX ORDER — BRIMONIDINE TARTRATE 0.15 %
1 DROPS OPHTHALMIC (EYE) 2 TIMES DAILY
COMMUNITY

## 2018-05-06 RX ORDER — LORAZEPAM 1 MG/1
1 TABLET ORAL DAILY
Status: DISCONTINUED | OUTPATIENT
Start: 2018-05-07 | End: 2018-05-07 | Stop reason: HOSPADM

## 2018-05-06 RX ORDER — LATANOPROST 50 UG/ML
1 SOLUTION/ DROPS OPHTHALMIC DAILY
Status: DISCONTINUED | OUTPATIENT
Start: 2018-05-07 | End: 2018-05-07 | Stop reason: HOSPADM

## 2018-05-06 RX ORDER — QUETIAPINE FUMARATE 100 MG/1
100 TABLET, FILM COATED ORAL
Status: DISCONTINUED | OUTPATIENT
Start: 2018-05-06 | End: 2018-05-07 | Stop reason: HOSPADM

## 2018-05-06 RX ORDER — ASPIRIN 81 MG/1
81 TABLET ORAL DAILY
Status: DISCONTINUED | OUTPATIENT
Start: 2018-05-07 | End: 2018-05-07 | Stop reason: HOSPADM

## 2018-05-06 RX ORDER — BRIMONIDINE TARTRATE 0.15 %
1 DROPS OPHTHALMIC (EYE) 2 TIMES DAILY
Status: DISCONTINUED | OUTPATIENT
Start: 2018-05-06 | End: 2018-05-07 | Stop reason: HOSPADM

## 2018-05-06 RX ORDER — TIMOLOL MALEATE 5 MG/ML
1 SOLUTION/ DROPS OPHTHALMIC EVERY MORNING
COMMUNITY
End: 2019-03-21 | Stop reason: SDUPTHER

## 2018-05-06 RX ORDER — FLUTICASONE PROPIONATE 50 MCG
1 SPRAY, SUSPENSION (ML) NASAL DAILY
Status: DISCONTINUED | OUTPATIENT
Start: 2018-05-07 | End: 2018-05-07 | Stop reason: HOSPADM

## 2018-05-06 RX ADMIN — TRAZODONE HYDROCHLORIDE 100 MG: 100 TABLET, FILM COATED ORAL at 23:19

## 2018-05-06 RX ADMIN — PANTOPRAZOLE SODIUM 20 MG: 20 TABLET, DELAYED RELEASE ORAL at 23:19

## 2018-05-06 RX ADMIN — BRIMONIDINE TARTRATE 1 DROP: 1.5 SOLUTION OPHTHALMIC at 23:21

## 2018-05-06 RX ADMIN — QUETIAPINE FUMARATE 100 MG: 100 TABLET ORAL at 23:19

## 2018-05-06 RX ADMIN — DOCUSATE SODIUM 100 MG: 100 CAPSULE, LIQUID FILLED ORAL at 23:19

## 2018-05-06 RX ADMIN — IOHEXOL 100 ML: 350 INJECTION, SOLUTION INTRAVENOUS at 18:47

## 2018-05-06 RX ADMIN — ACETAMINOPHEN 650 MG: 325 TABLET, FILM COATED ORAL at 23:19

## 2018-05-06 NOTE — ED ATTENDING ATTESTATION
Noreen Jung DO, saw and evaluated the patient  I have discussed the patient with the resident/non-physician practitioner and agree with the resident's/non-physician practitioner's findings, Plan of Care, and MDM as documented in the resident's/non-physician practitioner's note, except where noted  All available labs and Radiology studies were reviewed  At this point I agree with the current assessment done in the Emergency Department  I have conducted an independent evaluation of this patient a history and physical is as follows:    47 yom with epigastric pain for 2-3 months, worse when lying down, eating  No exertional component  Patient states that his left chest and his epigastrium lower hurt very badly and equally    Past Medical History:   Diagnosis Date    Cardiomyopathy ([address] )     Hyperthyroidism     Thyroid disease      Past Surgical History:   Procedure Laterality Date    CHOLECYSTECTOMY LAPAROSCOPIC      last assessed: 4/23/15    EYE SURGERY       A&Ox4  RRR  Mild end expiratory wheeze  Ab NT, soft, +ventral hernia (NT)  Equal peripheral pulses  Ext no edema    ECG does not show a STEMI  Ab/Cardiac eval underway  GI cocktail    Heart score is minimum of 3 based on his age, paternal history of MI in early 46s, hyperlipidemia, and characteristic of pain    On review of systems, his glaucoma is no worse than usual and he is being treated          DX  Chest pain  abd pain        Critical Care Time  CritCare Time    Procedures

## 2018-05-06 NOTE — ED PROVIDER NOTES
History  Chief Complaint   Patient presents with    Abdominal Pain     abdominal pain, recently diagnosed with constipation  Pt feels depressed  Patient is a 46 yo man with a pmh of depression, hld, cardiomyopathy, hyperthyroid who presents for evaluation chest pain  Patient is [place]-speaking only and  was used to obtain history and physical exam   Patient states he has had waxing and waning left lower chest pain for many months  For patient describes the pain as a fullness in his heart and a sensation that his heart is beating cut open with a hot burning knife  Patient has had multiple visits for these symptoms  He was seen by his PCP earlier this week  EKG was obtained that was read as normal and patient was discharged home on no new medications  He states he has continued to have the symptoms since going to his PCP  Patient states that the pain is worse with lying down  The pain intermittently radiates up to his throat  He does complain of a sour taste in his mouth as well  Patient also states that he is short of breath  Shortness of breath is worsened with exertion  He denies fever, chills, vomiting, diarrhea he does feel nauseous today  Prior to Admission Medications   Prescriptions Last Dose Informant Patient Reported? Taking? Cholecalciferol (VITAMIN D3) 2000 units capsule Past Week at Unknown time  Yes Yes   Sig: Take 1 tablet by mouth daily   LORazepam (ATIVAN) 1 mg tablet 5/5/2018 at Unknown time  Yes Yes   Sig: Take 1 mg by mouth daily   QUEtiapine (SEROQUEL) 100 mg tablet 5/5/2018 at Unknown time  Yes Yes   Sig: Take 100 mg by mouth daily at bedtime     acetaminophen (TYLENOL) 325 mg tablet 5/5/2018 at Unknown time  Yes Yes   Sig: Take 650 mg by mouth every 6 (six) hours as needed for mild pain   aspirin (ECOTRIN LOW STRENGTH) 81 mg EC tablet Past Week at Unknown time  Yes Yes   Sig: Take 81 mg by mouth daily   brimonidine (ALPHAGAN P) 0 15 % ophthalmic solution 2018 at Unknown time  Yes Yes   Sig: Apply 1 drop to eye 2 (two) times a day   docusate sodium (COLACE) 100 mg capsule 2018 at Unknown time  No Yes   Sig: Take 1 capsule by mouth 2 (two) times a day   fluticasone (FLONASE) 50 mcg/act nasal spray 2018 at Unknown time  Yes Yes   Si spray into each nostril daily   latanoprost (XALATAN) 0 005 % ophthalmic solution 2018 at Unknown time  Yes Yes   Sig: Apply 1 drop to eye daily   levothyroxine 125 mcg tablet 2018 at Unknown time  Yes Yes   Sig: Take 137 mcg by mouth daily     loratadine (CLARITIN) 10 mg tablet More than a month at Unknown time  Yes No   Sig: Take 10 mg by mouth daily  lovastatin (MEVACOR) 10 MG tablet 2018 at Unknown time  Yes Yes   Sig: Take 1 tablet by mouth daily   polyethylene glycol (GLYCOLAX) powder 2018 at Unknown time  No Yes   Sig: Take 17 g by mouth daily  timolol (TIMOPTIC) 0 5 % ophthalmic solution 2018 at Unknown time  Yes Yes   Sig: Apply 1 drop to eye every morning   traZODone (DESYREL) 100 mg tablet 2018 at Unknown time  Yes Yes   Sig: Take 100 mg by mouth daily at bedtime  Facility-Administered Medications: None       Past Medical History:   Diagnosis Date    Cardiomyopathy ([facility] [address] )     Hyperthyroidism     Thyroid disease        Past Surgical History:   Procedure Laterality Date    CHOLECYSTECTOMY LAPAROSCOPIC      last assessed: 4/23/15    EYE SURGERY         Family History   Problem Relation Age of Onset    Diabetes Mother     Hyperlipidemia Mother     Thyroid cancer Mother     Dementia Father     Coronary artery disease Maternal Grandmother     Hypertension Maternal Grandmother     Heart attack Maternal Grandmother      I have reviewed and agree with the history as documented      Social History   Substance Use Topics    Smoking status: Never Smoker    Smokeless tobacco: Never Used    Alcohol use Yes      Comment: social        Review of Systems   Constitutional: Negative for appetite change, chills, diaphoresis, fatigue and fever  HENT: Negative for congestion, rhinorrhea and sore throat  Respiratory: Positive for shortness of breath  Negative for apnea, cough, choking, chest tightness, wheezing and stridor  Cardiovascular: Positive for chest pain  Negative for palpitations and leg swelling  Gastrointestinal: Positive for nausea  Negative for abdominal distention, abdominal pain, constipation, diarrhea and vomiting  Genitourinary: Negative for dysuria and hematuria  Musculoskeletal: Negative for back pain, neck pain and neck stiffness  Skin: Negative for pallor, rash and wound  Neurological: Negative for dizziness, light-headedness and headaches  Psychiatric/Behavioral: Negative for behavioral problems and confusion  The patient is nervous/anxious  Physical Exam  ED Triage Vitals   Temperature Pulse Respirations Blood Pressure SpO2   05/06/18 1930 05/06/18 1700 05/06/18 1700 05/06/18 1700 05/06/18 1700   98 °F (36 7 °C) 93 16 135/53 99 %      Temp Source Heart Rate Source Patient Position - Orthostatic VS BP Location FiO2 (%)   05/06/18 1930 05/06/18 1905 05/06/18 1905 05/06/18 1905 --   Oral Monitor Lying Right arm       Pain Score       05/06/18 1700       6           Orthostatic Vital Signs  Vitals:    05/06/18 1905 05/06/18 1930 05/06/18 2244 05/07/18 0734   BP: 124/84 126/90 118/79 118/72   Pulse: 89 80 78 79   Patient Position - Orthostatic VS: Lying Lying         Physical Exam   Constitutional: He is oriented to person, place, and time  He appears well-developed and well-nourished  No distress  HENT:   Head: Normocephalic and atraumatic  Eyes: Conjunctivae and EOM are normal  Pupils are equal, round, and reactive to light  No scleral icterus  Neck: Normal range of motion  Neck supple  Cardiovascular: Normal rate, regular rhythm, normal heart sounds and intact distal pulses  Exam reveals no gallop and no friction rub      No murmur heard   Pulmonary/Chest: Effort normal and breath sounds normal  No respiratory distress  He has no wheezes  He exhibits tenderness  Chest wall tenderness to palpation of the xiphoid  Abdominal: Soft  Bowel sounds are normal  He exhibits no distension  There is tenderness  Epigastric tenderness   Musculoskeletal: Normal range of motion  He exhibits no edema  Neurological: He is alert and oriented to person, place, and time  He displays normal reflexes  No cranial nerve deficit or sensory deficit  He exhibits normal muscle tone  Coordination normal    Skin: Skin is warm and dry  No rash noted  He is not diaphoretic  Psychiatric: His behavior is normal  His mood appears anxious  Nursing note and vitals reviewed  ED Medications  Medications   iohexol (OMNIPAQUE) 350 MG/ML injection (MULTI-DOSE) 100 mL (100 mL Intravenous Given 5/6/18 1847)   potassium chloride (K-DUR,KLOR-CON) CR tablet 20 mEq (20 mEq Oral Given 5/7/18 1045)       Diagnostic Studies  Results Reviewed     Procedure Component Value Units Date/Time    Troponin I [[id number]]  (Normal) Collected:  05/06/18 1801    Lab Status:  Final result Specimen:  Blood from Arm, Left Updated:  05/06/18 1828     Troponin I <0 02 ng/mL     Narrative:         [employer] Chemistry analyzer 99% cutoff is > 0 04 ng/mL in network labs    o cTnI 99% cutoff is useful only when applied to patients in the clinical setting of myocardial ischemia  o cTnI 99% cutoff should be interpreted in the context of clinical history, ECG findings and possibly cardiac imaging to establish correct diagnosis  o cTnI 99% cutoff may be suggestive but clearly not indicative of a coronary event without the clinical setting of myocardial ischemia      Comprehensive metabolic panel [[id number]] Collected:  05/06/18 1801    Lab Status:  Final result Specimen:  Blood from Arm, Left Updated:  05/06/18 1827     Sodium 140 mmol/L      Potassium 3 7 mmol/L      Chloride 106 mmol/L      CO2 29 mmol/L      Anion Gap 5 mmol/L      BUN 12 mg/dL      Creatinine 1 13 mg/dL      Glucose 101 mg/dL      Calcium 8 7 mg/dL      AST 22 U/L      ALT 42 U/L      Alkaline Phosphatase 73 U/L      Total Protein 7 4 g/dL      Albumin 3 7 g/dL      Total Bilirubin 0 45 mg/dL      eGFR 74 ml/min/1 73sq m     Narrative:         National Kidney Disease Education Program recommendations are as follows:  GFR calculation is accurate only with a steady state creatinine  Chronic Kidney disease less than 60 ml/min/1 73 sq  meters  Kidney failure less than 15 ml/min/1 73 sq  meters  Lipase [[id number]]  (Normal) Collected:  05/06/18 1801    Lab Status:  Final result Specimen:  Blood from Arm, Left Updated:  05/06/18 1827     Lipase 142 u/L     CBC and differential [[id number]] Collected:  05/06/18 1801    Lab Status:  Final result Specimen:  Blood from Arm, Left Updated:  05/06/18 1811     WBC 7 83 Thousand/uL      RBC 5 50 Million/uL      Hemoglobin 15 8 g/dL      Hematocrit 48 1 %      MCV 88 fL      MCH 28 7 pg      MCHC 32 8 g/dL      RDW 13 5 %      MPV 10 2 fL      Platelets 434 Thousands/uL      nRBC 0 /100 WBCs      Neutrophils Relative 66 %      Lymphocytes Relative 25 %      Monocytes Relative 6 %      Eosinophils Relative 3 %      Basophils Relative 0 %      Neutrophils Absolute 5 11 Thousands/µL      Lymphocytes Absolute 1 95 Thousands/µL      Monocytes Absolute 0 50 Thousand/µL      Eosinophils Absolute 0 21 Thousand/µL      Basophils Absolute 0 02 Thousands/µL                  XR chest 2 views   Final Result by Candida Lazaro MD (05/07 0818)      No acute cardiopulmonary disease  Workstation performed: [id number]         CT abdomen pelvis with contrast   Final Result by Rosalio Castaneda DO (05/06 1903)      No acute intra-abdominal abnormality              Workstation performed: [id number]               Procedures  ECG 12 Lead Documentation  Date/Time: 5/6/2018 6:27 PM  Performed by: Maria Guadalupe Bailey D  Authorized by: Manny Christensen     Indications / Diagnosis:  Chest pain  ECG reviewed by me, the ED Provider: yes    Patient location:  ED  Previous ECG:     Previous ECG:  Compared to current    Similarity:  No change    Comparison to cardiac monitor: Yes    Interpretation:     Interpretation: non-specific    Rate:     ECG rate:  83    ECG rate assessment: normal    Rhythm:     Rhythm: sinus rhythm    Ectopy:     Ectopy: none    QRS:     QRS axis:  Normal  Conduction:     Conduction: normal    ST segments:     ST segments:  Normal  T waves:     T waves: normal              Phone Consults  ED Phone Contact    ED Course         HEART Risk Score      Most Recent Value   History  1 Filed at: 05/06/2018 1844   ECG  0 Filed at: 05/06/2018 1803   Age  1 Filed at: 05/06/2018 1844   Risk Factors  2 Filed at: 05/06/2018 1844   Troponin  0 Filed at: 05/06/2018 1803   Heart Score Risk Calculator   History  1 Filed at: 05/06/2018 1844   ECG  0 Filed at: 05/06/2018 1803   Age  1 Filed at: 05/06/2018 1844   Risk Factors  2 Filed at: 05/06/2018 1844   Troponin  0 Filed at: 05/06/2018 1803   HEART Score  3 Filed at: 05/06/2018 1803   HEART Score  3 Filed at: 05/06/2018 1803                            MDM  Number of Diagnoses or Management Options  Chest pain: new and requires workup  Diagnosis management comments: 25-year-old man presents with left sided chest pain  The symptoms have been waxing and waning for roughly 3 months  He has had multiple evaluations for these symptoms  Patient has epigastric tenderness to palpation with normal vital signs exam   EKG is unremarkable and unchanged from prior studies  No evidence of ischemia  CBC, CMP, lipase, chest x-ray, CT abdomen pelvis with IV contrast obtained  Troponin also obtained and is negative  CT abdomen pelvis unremarkable    Patient does have a heart score of 4 including history of smoking, family history of MI at early age, cardiomyopathy diagnosed by abnormal echo in January  Given patient's symptoms and risk factors will admit to [facility] for ACS rule out  Amount and/or Complexity of Data Reviewed  Clinical lab tests: ordered and reviewed  Tests in the radiology section of CPT®: ordered and reviewed  Tests in the medicine section of CPT®: ordered and reviewed  Decide to obtain previous medical records or to obtain history from someone other than the patient: yes  Obtain history from someone other than the patient: yes  Review and summarize past medical records: yes  Discuss the patient with other providers: yes  Independent visualization of images, tracings, or specimens: yes    Risk of Complications, Morbidity, and/or Mortality  Presenting problems: low  Diagnostic procedures: minimal  Management options: minimal    Patient Progress  Patient progress: stable    CritCare Time    Disposition  Final diagnoses:   Chest pain     Time reflects when diagnosis was documented in both MDM as applicable and the Disposition within this note     Time User Action Codes Description Comment    5/6/2018  7:10 PM Nhung Rice [R07 9] Chest pain     5/7/2018 10:58 AM Cinthya Laird [K21 9] Gastroesophageal reflux disease, esophagitis presence not specified       ED Disposition     ED Disposition Condition Comment    Admit  Case was discussed with SOD and the patient's admission status was agreed to be Admission Status: observation status to the service of Dr Rhianna Michele          Follow-up Information    None       Discharge Medication List as of 5/7/2018 12:55 PM      START taking these medications    Details   pantoprazole (PROTONIX) 20 mg tablet Take 1 tablet (20 mg total) by mouth daily, Starting Mon 5/7/2018, Normal         CONTINUE these medications which have NOT CHANGED    Details   acetaminophen (TYLENOL) 325 mg tablet Take 650 mg by mouth every 6 (six) hours as needed for mild pain, Historical Med      aspirin (ECOTRIN LOW STRENGTH) 81 mg EC tablet Take 81 mg by mouth daily, Historical Med      brimonidine (ALPHAGAN P) 0 15 % ophthalmic solution Apply 1 drop to eye 2 (two) times a day, Historical Med      Cholecalciferol (VITAMIN D3) 2000 units capsule Take 1 tablet by mouth daily, Starting Thu 3/16/2017, Historical Med      docusate sodium (COLACE) 100 mg capsule Take 1 capsule by mouth 2 (two) times a day, Starting Mon 7/17/2017, Print      fluticasone (FLONASE) 50 mcg/act nasal spray 1 spray into each nostril daily, Historical Med      latanoprost (XALATAN) 0 005 % ophthalmic solution Apply 1 drop to eye daily, Historical Med      levothyroxine 125 mcg tablet Take 137 mcg by mouth daily  , Historical Med      LORazepam (ATIVAN) 1 mg tablet Take 1 mg by mouth daily, Historical Med      lovastatin (MEVACOR) 10 MG tablet Take 1 tablet by mouth daily, Starting Thu 6/19/2014, Historical Med      polyethylene glycol (GLYCOLAX) powder Take 17 g by mouth daily  , Starting 4/17/2016, Until Discontinued, Print      QUEtiapine (SEROQUEL) 100 mg tablet Take 100 mg by mouth daily at bedtime  , Until Discontinued, Historical Med      timolol (TIMOPTIC) 0 5 % ophthalmic solution Apply 1 drop to eye every morning, Historical Med      traZODone (DESYREL) 100 mg tablet Take 100 mg by mouth daily at bedtime  , Until Discontinued, Historical Med      loratadine (CLARITIN) 10 mg tablet Take 10 mg by mouth daily  , Until Discontinued, Historical Med             Outpatient Discharge Orders  Discharge Diet     Activity as tolerated     Call provider for:  persistent nausea or vomiting         ED Provider  Attending physically available and evaluated Benitez Mcdonough I managed the patient along with the ED Attending      Electronically Signed by         Lyly Nieves MD  05/11/18 7235

## 2018-05-07 VITALS
WEIGHT: 177.25 LBS | OXYGEN SATURATION: 96 % | TEMPERATURE: 98.3 F | DIASTOLIC BLOOD PRESSURE: 72 MMHG | SYSTOLIC BLOOD PRESSURE: 118 MMHG | HEART RATE: 79 BPM | RESPIRATION RATE: 20 BRPM | HEIGHT: 68 IN | BODY MASS INDEX: 26.86 KG/M2

## 2018-05-07 LAB
ANION GAP SERPL CALCULATED.3IONS-SCNC: 6 MMOL/L (ref 4–13)
ATRIAL RATE: 75 BPM
ATRIAL RATE: 83 BPM
BASOPHILS # BLD AUTO: 0.02 THOUSANDS/ΜL (ref 0–0.1)
BASOPHILS NFR BLD AUTO: 0 % (ref 0–1)
BUN SERPL-MCNC: 11 MG/DL (ref 5–25)
CALCIUM SERPL-MCNC: 8.7 MG/DL (ref 8.3–10.1)
CHLORIDE SERPL-SCNC: 105 MMOL/L (ref 100–108)
CO2 SERPL-SCNC: 27 MMOL/L (ref 21–32)
CREAT SERPL-MCNC: 1.3 MG/DL (ref 0.6–1.3)
EOSINOPHIL # BLD AUTO: 0.35 THOUSAND/ΜL (ref 0–0.61)
EOSINOPHIL NFR BLD AUTO: 5 % (ref 0–6)
ERYTHROCYTE [DISTWIDTH] IN BLOOD BY AUTOMATED COUNT: 14 % (ref 11.6–15.1)
GFR SERPL CREATININE-BSD FRML MDRD: 62 ML/MIN/1.73SQ M
GLUCOSE SERPL-MCNC: 88 MG/DL (ref 65–140)
HCT VFR BLD AUTO: 45.5 % (ref 36.5–49.3)
HGB BLD-MCNC: 15.3 G/DL (ref 12–17)
LYMPHOCYTES # BLD AUTO: 2.52 THOUSANDS/ΜL (ref 0.6–4.47)
LYMPHOCYTES NFR BLD AUTO: 36 % (ref 14–44)
MCH RBC QN AUTO: 28.5 PG (ref 26.8–34.3)
MCHC RBC AUTO-ENTMCNC: 33.6 G/DL (ref 31.4–37.4)
MCV RBC AUTO: 85 FL (ref 82–98)
MONOCYTES # BLD AUTO: 0.62 THOUSAND/ΜL (ref 0.17–1.22)
MONOCYTES NFR BLD AUTO: 9 % (ref 4–12)
NEUTROPHILS # BLD AUTO: 3.52 THOUSANDS/ΜL (ref 1.85–7.62)
NEUTS SEG NFR BLD AUTO: 50 % (ref 43–75)
NRBC BLD AUTO-RTO: 0 /100 WBCS
P AXIS: 39 DEGREES
P AXIS: 61 DEGREES
PLATELET # BLD AUTO: 199 THOUSANDS/UL (ref 149–390)
PMV BLD AUTO: 10.3 FL (ref 8.9–12.7)
POTASSIUM SERPL-SCNC: 3.3 MMOL/L (ref 3.5–5.3)
PR INTERVAL: 144 MS
PR INTERVAL: 154 MS
QRS AXIS: 68 DEGREES
QRS AXIS: 97 DEGREES
QRSD INTERVAL: 100 MS
QRSD INTERVAL: 90 MS
QT INTERVAL: 376 MS
QT INTERVAL: 404 MS
QTC INTERVAL: 441 MS
QTC INTERVAL: 451 MS
RBC # BLD AUTO: 5.36 MILLION/UL (ref 3.88–5.62)
SODIUM SERPL-SCNC: 138 MMOL/L (ref 136–145)
T WAVE AXIS: 51 DEGREES
T WAVE AXIS: 59 DEGREES
TROPONIN I SERPL-MCNC: <0.02 NG/ML
VENTRICULAR RATE: 75 BPM
VENTRICULAR RATE: 83 BPM
WBC # BLD AUTO: 7.05 THOUSAND/UL (ref 4.31–10.16)

## 2018-05-07 PROCEDURE — 80048 BASIC METABOLIC PNL TOTAL CA: CPT | Performed by: INTERNAL MEDICINE

## 2018-05-07 PROCEDURE — 93010 ELECTROCARDIOGRAM REPORT: CPT | Performed by: INTERNAL MEDICINE

## 2018-05-07 PROCEDURE — 93005 ELECTROCARDIOGRAM TRACING: CPT | Performed by: INTERNAL MEDICINE

## 2018-05-07 PROCEDURE — 84484 ASSAY OF TROPONIN QUANT: CPT | Performed by: INTERNAL MEDICINE

## 2018-05-07 PROCEDURE — 85025 COMPLETE CBC W/AUTO DIFF WBC: CPT | Performed by: INTERNAL MEDICINE

## 2018-05-07 RX ORDER — POTASSIUM CHLORIDE 20 MEQ/1
20 TABLET, EXTENDED RELEASE ORAL ONCE
Status: COMPLETED | OUTPATIENT
Start: 2018-05-07 | End: 2018-05-07

## 2018-05-07 RX ORDER — PANTOPRAZOLE SODIUM 20 MG/1
20 TABLET, DELAYED RELEASE ORAL DAILY
Qty: 30 TABLET | Refills: 0 | Status: SHIPPED | OUTPATIENT
Start: 2018-05-07 | End: 2018-06-01 | Stop reason: SDUPTHER

## 2018-05-07 RX ADMIN — TIMOLOL MALEATE 1 DROP: 5 SOLUTION/ DROPS OPHTHALMIC at 10:47

## 2018-05-07 RX ADMIN — FLUTICASONE PROPIONATE 1 SPRAY: 50 SPRAY, METERED NASAL at 10:46

## 2018-05-07 RX ADMIN — LORAZEPAM 1 MG: 1 TABLET ORAL at 10:45

## 2018-05-07 RX ADMIN — DOCUSATE SODIUM 100 MG: 100 CAPSULE, LIQUID FILLED ORAL at 10:44

## 2018-05-07 RX ADMIN — POTASSIUM CHLORIDE 20 MEQ: 1500 TABLET, EXTENDED RELEASE ORAL at 10:45

## 2018-05-07 RX ADMIN — LORATADINE 10 MG: 10 TABLET ORAL at 10:46

## 2018-05-07 RX ADMIN — BRIMONIDINE TARTRATE 1 DROP: 1.5 SOLUTION OPHTHALMIC at 10:47

## 2018-05-07 RX ADMIN — LATANOPROST 1 DROP: 50 SOLUTION OPHTHALMIC at 10:47

## 2018-05-07 RX ADMIN — ASPIRIN 81 MG: 81 TABLET, COATED ORAL at 10:44

## 2018-05-07 RX ADMIN — VITAMIN D, TAB 1000IU (100/BT) 2000 UNITS: 25 TAB at 10:44

## 2018-05-07 RX ADMIN — ENOXAPARIN SODIUM 40 MG: 40 INJECTION SUBCUTANEOUS at 10:45

## 2018-05-07 RX ADMIN — LEVOTHYROXINE SODIUM 137 MCG: 25 TABLET ORAL at 05:20

## 2018-05-07 NOTE — DISCHARGE SUMMARY
[facility] Discharge Summary - Medical Luis Manuel Mann 47 y o  male MRN: [id number]    [address] [facility]  Room / Bed: [facility] 619/[facility] 454-56 Encounter: [id number]    BRIEF OVERVIEW    Admitting Provider: Bonita Vazquez MD  Discharge Provider: Daquan Yi MD  Primary Care Physician at Discharge: Jitendra Haas 32  Admission Date: 5/6/2018     Discharge Date: 5/7/2018  1:26 149 Marshal Gifford is a 47year old man with past medical history of bipolar disorder, depression, anxiety, hyperlipidemia, hypothyroidism, presented for evaluation of left-sided chest pain and epigastric pain  See HPI for further details  On admission vitals were stable  Laboratory examinations unremarkable  Troponin were all negative  Chest x-ray did show any acute abnormalities  CT abdomen did not reveal any pathology  EKG showed normal sinus rhythm right plasencia axis deviation, unchanged from previous EKGs  Patient was admitted to rule out acute coronary syndrome  Patient was given Mylanta as well as a PPI and symptoms improved  On day of discharge physical exam was  Gen: NAD  HEENT: mmm  Lungs: CTA  Abd: non tender, reducible umbilical hernia  Ext:  No edema    Chest pain and epigastric pain resolved  Troponins were all negative, EKG was normal sinus rhythm with nonspecific ST changes  Symptoms most likely related to untreated GERD/gastritis with no alarming symptoms indicated for any inpatient endoscopy  Patient was discharged with a trial of PPIs  Patient left in a stable state with follow-up to primary care physician  Presenting Problem/History of Present Illness  Principal Problem (Resolved):     Atypical chest pain  Active Problems:    Constipation    Allergic rhinitis    Bipolar disorder ([address] )    Depression with anxiety    Glaucoma of both eyes    Hyperlipidemia    Hypothyroidism    GERD (gastroesophageal reflux disease)  Resolved Problems: Epigastric pain        Diagnostic Procedures Performed  Imaging Studies:  Xr Chest 2 Views    Result Date: 5/7/2018  Impression: No acute cardiopulmonary disease  Workstation performed: [id number]     Ct Abdomen Pelvis With Contrast    Result Date: 5/6/2018  Impression: No acute intra-abdominal abnormality  Workstation performed: [id number]       Pertinent Labs:      Results from last 7 days  Lab Units 05/07/18  0616 05/06/18  1801   SODIUM mmol/L 138 140   POTASSIUM mmol/L 3 3* 3 7   CHLORIDE mmol/L 105 106   CO2 mmol/L 27 29   BUN mg/dL 11 12   CREATININE mg/dL 1 30 1 13   CALCIUM mg/dL 8 7 8 7   TOTAL PROTEIN g/dL  --  7 4   BILIRUBIN TOTAL mg/dL  --  0 45   ALK PHOS U/L  --  73   ALT U/L  --  42   AST U/L  --  22   GLUCOSE RANDOM mg/dL 88 101         Therapeutic Procedures Performed  N/A    Test Results Pending at Discharge:   n/a    Medications     Medication List to be Continued at Discharge  Current Discharge Medication List      CONTINUE these medications which have NOT CHANGED    Details   acetaminophen (TYLENOL) 325 mg tablet Take 650 mg by mouth every 6 (six) hours as needed for mild pain      aspirin (ECOTRIN LOW STRENGTH) 81 mg EC tablet Take 81 mg by mouth daily      brimonidine (ALPHAGAN P) 0 15 % ophthalmic solution Apply 1 drop to eye 2 (two) times a day      Cholecalciferol (VITAMIN D3) 2000 units capsule Take 1 tablet by mouth daily      docusate sodium (COLACE) 100 mg capsule Take 1 capsule by mouth 2 (two) times a day  Qty: 30 capsule, Refills: 0      fluticasone (FLONASE) 50 mcg/act nasal spray 1 spray into each nostril daily      latanoprost (XALATAN) 0 005 % ophthalmic solution Apply 1 drop to eye daily      levothyroxine 125 mcg tablet Take 137 mcg by mouth daily        LORazepam (ATIVAN) 1 mg tablet Take 1 mg by mouth daily      lovastatin (MEVACOR) 10 MG tablet Take 1 tablet by mouth daily      polyethylene glycol (GLYCOLAX) powder Take 17 g by mouth daily    Qty: 119 g, Refills: 0 QUEtiapine (SEROQUEL) 100 mg tablet Take 100 mg by mouth daily at bedtime  timolol (TIMOPTIC) 0 5 % ophthalmic solution Apply 1 drop to eye every morning      traZODone (DESYREL) 100 mg tablet Take 100 mg by mouth daily at bedtime  loratadine (CLARITIN) 10 mg tablet Take 10 mg by mouth daily  Discharge Medication List as of 5/7/2018 12:55 PM      START taking these medications    Details   pantoprazole (PROTONIX) 20 mg tablet Take 1 tablet (20 mg total) by mouth daily, Starting Mon 5/7/2018, Normal                 Allergies  No Known Allergies  Discharge Diet: cardiac diet  Activity restrictions: none  Discharge Condition: stable  Discharged With Lines: no    Discharge Disposition: [facility] [address] / Family Member Name: Karli Gustafson  Phone Number: [phone]    Outpatient Follow-Up  yes      Follow up: PCP  Date and time: Call for appointment  Location: [address] [place] [address]  Follow up within next: 1-2 weeks      Code Status: Prior  Advance Directive and Living Will: <no information>  Power of :    POLST:      Discharge  Statement   I spent 20 minutes minutes discharging the patient  This time was spent on the day of discharge  I had direct contact with the patient on the day of discharge  Additional documentation is required if more than 30 minutes were spent on discharge

## 2018-05-07 NOTE — CASE MANAGEMENT
Initial Clinical Review    Thank you,  [facility] in the [facility] by Kory Ny for 2017  Network Utilization Review Department  Phone: [phone]; Fax [phone]  ATTENTION: The Network Utilization Review Department is now centralized for our 7 Facilities  Make a note that we have a new phone and fax numbers for our Department  Please call with any questions or concerns to [phone] and carefully follow the prompts so that you are directed to the right person  All voicemails are confidential  Fax any determinations, approvals, denials, and requests for initial or continue stay review clinical to [phone]  Due to HIGH CALL volume, it would be easier if you could please send faxed requests to expedite your requests and in part, help us provide discharge notifications faster  Admission: Date/Time/Statement: Placed in Observation status on 5/6 @ 19:12    Orders Placed This Encounter   Procedures    Place in Observation (expected length of stay for this patient is less than two midnights)     Standing Status:   Standing     Number of Occurrences:   1     Order Specific Question:   Admitting Physician     Answer:   MARVEL GUPTA [[id number]]     Order Specific Question:   Level of Care     Answer:   Med Surg [[id number]]         ED: Date/Time/Mode of Arrival:   ED Arrival Information     Expected Arrival Acuity Means of Arrival Escorted By Service Admission Type    - 5/6/2018 15:55 Urgent Ambulance [facility] Urgent    Arrival Complaint    psych eval          Chief Complaint:   Chief Complaint   Patient presents with    Abdominal Pain     abdominal pain, recently diagnosed with constipation  Pt feels depressed  History of Illness: Andry Early is a 47 y o  male  who presented to the emergency department with complaints of left-sided chest pain and epigastric pain    Patient states he has been having this pain for the past couple of months  He describes the pain is located in the epigastric and the left sided chest, burning and pressure-like in nature, no exertional component, worse when lying down flat at night and in the morning  Partially relieved with eating  Patient also states this is associated with nausea but no vomiting  He describes burning pain that goes up his chest that he feels like he needs to "vomit out" though he does not vomit  He also has associated dry cough  Of note, patient was recently seen at [facility] on May 4 by his PCP Dr Claude Kaplan and had similar complaints  At that time the point of care EKG was done which was normal   His chest pain was not thought to be of cardiac origin at that time  Patient was given naproxen for pain relief  Patient is not on a PPI/H2 blocker  Patient reports similar pain approximately 5 years ago which he states resolved spontaneously and he had not had issues since  He denies tobacco abuse  Currently patient is without chest pain     ED Vital Signs:   ED Triage Vitals   Temperature Pulse Respirations Blood Pressure SpO2   05/06/18 1930 05/06/18 1700 05/06/18 1700 05/06/18 1700 05/06/18 1700   98 °F (36 7 °C) 93 16 135/53 99 % RA sat       Temp Source Heart Rate Source Patient Position - Orthostatic VS BP Location FiO2 (%)   05/06/18 1930 05/06/18 1905 05/06/18 1905 05/06/18 1905 --   Oral Monitor Lying Right arm       Pain Score       05/06/18 1700       6        Wt Readings from Last 1 Encounters:   05/06/18 80 4 kg (177 lb 4 oz)       Abnormal Labs/Diagnostic Test Results:    Ref Range & Units 5/7/18 0616 5/6/18 1801   Sodium 136 - 145 mmol/L 138  140    Potassium 3 5 - 5 3 mmol/L 3 3   3 7    Chloride 100 - 108 mmol/L 105  106    CO2 21 - 32 mmol/L 27  29    Anion Gap 4 - 13 mmol/L 6  5    BUN 5 - 25 mg/dL 11  12    Creatinine 0 60 - 1 30 mg/dL 1 30  1  13CM    Glucose 65 - 140 mg/dL 88  101CM    Calcium 8 3 - 10 1 mg/dL 8 7  8 7    eGFR ml/min/1 73sq m 62 74            Troponin 's 0 02-0 02-0 02     Ref Range & Units 5/7/18 0616 5/6/18 2242 5/6/18 1801   WBC 4 31 - 10 16 Thousand/uL 7 05   7 83    RBC 3 88 - 5 62 Million/uL 5 36   5 50    Hemoglobin 12 0 - 17 0 g/dL 15 3   15 8    Hematocrit 36 5 - 49 3 % 45 5   48 1    MCV 82 - 98 fL 85   88    MCH 26 8 - 34 3 pg 28 5   28 7    MCHC 31 4 - 37 4 g/dL 33 6   32 8    RDW 11 6 - 15 1 % 14 0   13 5    MPV 8 9 - 12 7 fL 10 3  9 9  10 2    Platelets 674 - 093 Thousands/uL 199  198  199    nRBC /100 WBCs 0   0    Neutrophils Relative 43 - 75 % 50   66    Lymphocytes Relative 14 - 44 % 36   25    Monocytes Relative 4 - 12 % 9   6    Eosinophils Relative 0 - 6 % 5   3    Basophils Relative 0 - 1 % 0   0    Neutrophils Absolute 1 85 - 7 62 Thousands/µL 3 52   5 11    Lymphocytes Absolute 0 60 - 4 47 Thousands/µL 2 52   1 95    Monocytes Absolute 0 17 - 1 22 Thousand/µL 0 62   0 50    Eosinophils Absolute 0 00 - 0 61 Thousand/µL 0 35   0 21    Basophils Absolute 0 00 - 0 10 Thousands/µL 0 02   0 02              ED Treatment:   Medication Administration from 05/06/2018 1555 to 05/06/2018 1930       Date/Time Order Dose Route Action     05/06/2018 1847 iohexol (OMNIPAQUE) 350 MG/ML injection (MULTI-DOSE) 100 mL 100 mL Intravenous Given       Past Medical/Surgical History:   Diagnosis    Cardiomyopathy ([facility] [address] )    Hyperthyroidism    Thyroid disease     Procedure    CHOLECYSTECTOMY LAPAROSCOPIC        EYE SURGERY         Admitting Diagnosis: Chest pain [R07 9]  Encounter for psychological evaluation [Z00 8]    Age/Sex: 47 y o  male    Assessment/Plan:   1  Atypical chest pain/epigastric pain  · Unlikely ACS given symptoms more consistent with GERD/duodenal ulcer  Heart score 3 with hyperlipidemia, family history of MI and obesity  Troponin x1 negative  EKG unremarkable  · Trend tropes x2 more  · Repeat EKG in a m  · Avoid NSAIDs    Will trial Mylanta p r n  as well as start PPI 20 mg b i d  as patient has symptoms both in the a m  and at night while lying flat  · Non ulcerogenic/cardiac diet  · No alarm symptoms such as weight loss, early satiety  Though given that patient is lower than 50 and has recurrence of his symptoms (though has not been on PPI) consider GI consult for possible inpatient EGD versus outpatient  NPO after midnight  · ASA and statin  Patient had A1c and lipid panel in 12/2017     2  Hyperlipidemia  · Continue statin     3  Hypothyroidism  · TSH 12/2017 was 2 89  Continue levothyroxine 137 mcg daily     4   Bilateral glaucoma  · Continue brimonidine, latanoprost, and timolol drops     5  Depression with anxiety/bipolar disorder  · Stable  Continue Seroquel 100 mg q h s , trazodone 100 mg q h s , and Ativan 1 mg daily     6  Constipation  · Continue Colace and polyethylene glycol p r n      7  Allergic rhinitis  · Continue Claritin and Flonase       Admission Orders:  Scheduled Meds:   Current Facility-Administered Medications:  acetaminophen 650 mg Oral Q6H PRN 5/6 x 1   aluminum-magnesium hydroxide-simethicone 30 mL Oral Q4H PRN    aspirin 81 mg Oral Daily    brimonidine 1 drop Both Eyes BID    cholecalciferol 2,000 Units Oral Daily    docusate sodium 100 mg Oral BID    enoxaparin 40 mg Subcutaneous Daily    fluticasone 1 spray Nasal Daily    latanoprost 1 drop Both Eyes Daily    levothyroxine 137 mcg Oral Early Morning    loratadine 10 mg Oral Daily    LORazepam 1 mg Oral Daily    pantoprazole 20 mg Oral BID AC    polyethylene glycol 17 g Oral Daily PRN    potassium chloride 20 mEq Oral Once    pravastatin 10 mg Oral Daily With Dinner    QUEtiapine 100 mg Oral HS    timolol 1 drop Both Eyes QAM    traZODone 100 mg Oral HS      Nursing Orders- Telem - VS q 4 - Up and OOB as tolerated - SCD's to le's- Incentive spirometry - Diet NPO     Gastroenterology - EGD?

## 2018-05-07 NOTE — H&P
INTERNAL MEDICINE HISTORY AND PHYSICAL  [facility] [address] SOD Team B     NAME: Wood Neri  AGE: 47 y o  SEX: male  : 1965   MRN: [id number]  ENCOUNTER: [id number]    DATE: 2018  TIME: 8:23 PM    Primary Care Physician: Barrett Sandhu DO  Admitting Provider: Tyron Gibbs MD    Chief complaint:  Epigastric/left-sided chest pain    History of Present Illness     Wood Neri is a 47 y o  male with past medical history of hyperlipidemia, hypothyroidism, bipolar disorder/depression with anxiety, allergic rhinitis, glaucoma who presented to the emergency department with complaints of left-sided chest pain and epigastric pain  Patient states he has been having this pain for the past couple of months  He describes the pain is located in the epigastric and the left sided chest, burning and pressure-like in nature, no exertional component, worse when lying down flat at night and in the morning  Partially relieved with eating  Patient also states this is it is associated with nausea but no vomiting  He describes burning pain that goes up his chest that he feels like he needs to "vomit out" though he does not vomit  He also has associated dry cough  Of note, patient was recently seen at [facility] on May 4 by his PCP Dr Ernst Staples and had similar complaints  At that time the point of care EKG was done which was normal   His chest pain was not thought to be of cardiac origin at that time  Patient was given naproxen for pain relief  Patient is not on a PPI/H2 blocker  Patient reports similar pain approximately 5 years ago which he states resolved spontaneously and he had not had issues since  He denies tobacco abuse  Currently patient is without chest pain     In the ED, labs were unremarkable  Troponin x1 negative  Chest x-ray and CT abdomen unremarkable  Heart score 3  Patient admitted for ACS r/o    Review of Systems   Review of Systems   Constitutional: Positive for fatigue  Negative for activity change, appetite change, chills, fever and unexpected weight change  HENT: Negative  Eyes: Positive for visual disturbance  Respiratory: Positive for cough  Negative for shortness of breath and wheezing  Cardiovascular: Positive for chest pain  Negative for palpitations and leg swelling  Gastrointestinal: Positive for abdominal pain, constipation and nausea  Negative for abdominal distention, diarrhea and vomiting  Endocrine: Negative  Genitourinary: Negative  Musculoskeletal: Positive for arthralgias and myalgias  Skin: Negative  Allergic/Immunologic: Positive for environmental allergies  Neurological: Negative for dizziness, tremors, seizures, syncope, weakness, numbness and headaches  Hematological: Negative  Psychiatric/Behavioral: Negative  Past Medical History     Past Medical History:   Diagnosis Date    Cardiomyopathy [address] [address]     Hyperthyroidism     Thyroid disease        Past Surgical History     Past Surgical History:   Procedure Laterality Date    CHOLECYSTECTOMY LAPAROSCOPIC      last assessed: 4/23/15    EYE SURGERY         Social History     History   Alcohol Use    Yes     Comment: social     History   Drug Use No     History   Smoking Status    Never Smoker   Smokeless Tobacco    Never Used       Family History     Family History   Problem Relation Age of Onset    Diabetes Mother     Hyperlipidemia Mother     Thyroid cancer Mother     Dementia Father     Coronary artery disease Maternal Grandmother     Hypertension Maternal Grandmother     Heart attack Maternal Grandmother        Medications Prior to Admission     Prior to Admission medications    Medication Sig Start Date End Date Taking?  Authorizing Provider   aspirin (ECOTRIN LOW STRENGTH) 81 mg EC tablet Take 81 mg by mouth daily   Yes Historical Provider, MD   docusate sodium (COLACE) 100 mg capsule Take 1 capsule by mouth 2 (two) times a day 7/17/17  Yes [id number] Adan Loving DO   fluticasone (FLONASE) 50 mcg/act nasal spray 1 spray into each nostril daily   Yes Historical Provider, MD   levothyroxine 125 mcg tablet Take 137 mcg by mouth daily     Yes Historical Provider, MD   loratadine (CLARITIN) 10 mg tablet Take 10 mg by mouth daily  Yes Historical Provider, MD   LORazepam (ATIVAN) 1 mg tablet Take 1 mg by mouth daily   Yes Historical Provider, MD   polyethylene glycol (GLYCOLAX) powder Take 17 g by mouth daily  4/17/16  Yes Angel Luis Perea DO   QUEtiapine (SEROQUEL) 100 mg tablet Take 100 mg by mouth daily at bedtime  Yes Historical Provider, MD   traZODone (DESYREL) 100 mg tablet Take 100 mg by mouth daily at bedtime  Yes Historical Provider, MD   naproxen (NAPROSYN) 500 mg tablet Take 0 5 tablets (250 mg total) by mouth 2 (two) times a day with meals 5/4/18 5/6/18  Clyde Mathews MD   polyethylene glycol (GLYCOLAX) powder Take 17 g by mouth daily 7/17/17 5/6/18  Angely July, DO       Allergies   No Known Allergies    Objective     Vitals:    05/06/18 1757 05/06/18 1900 05/06/18 1905 05/06/18 1930   BP: 135/57 124/84 124/84 126/90   BP Location:   Right arm Right arm   Pulse: 84 84 89 80   Resp:   12 20   Temp:    98 °F (36 7 °C)   TempSrc:    Oral   SpO2:  97% 98% 98%   Weight:    80 4 kg (177 lb 4 oz)   Height:    5' 8" (1 727 m)     Body mass index is 26 95 kg/m²  No intake or output data in the 24 hours ending 05/06/18 2023  Invasive Devices     Peripheral Intravenous Line            Peripheral IV 05/06/18 Left Antecubital less than 1 day                Physical Exam  GENERAL: Appears well-developed and well-nourished  Appears in no acute distress   HEENT: Normocephalic and atraumatic  No scleral icterus  PERRLA  EOMI B/L  No oropharyngeal edema  MM moist   Conjunctiva bilat erythema noted   NECK: Neck supple with no lymphadenopathy  Trachea midline  No JVD  CARDIOVASCULAR: S1 and S2 are present  Regular rate and rhythm  No murmurs, rubs, or gallops     RESPIRATORY: CTA B/L, no rales, rhonci or wheezes  Normal respiratory expansion  ABDOMINAL: Bowel sounds present in all 4 quadrants, non-tender, soft, non-distended  No organomegaly, rebound, or guarding  Obese abdomen, soft  Tender to palpation in epigastrium   EXTREMITIES: 2+ DP and PT pulses bilaterally; no cyanosis, clubbing, edema  ROM intact  CASTORENA x4   MUSCULOSKELETAL: No joint tenderness, deformity or swelling, full range of motion without pain  No chest pain elicited with palpation of chest   NEUROLOGIC: Patient is alert and oriented to person, place, and time  No sensory or motor deficits  CN 2-12 intact  Plantars downgoing bilaterally  Speech fluent  SKIN: Skin is warm and dry  No skin lesions are present  No rashes  PSYCHIATRIC: Normal mood and affect     Lab Results: I have personally reviewed pertinent reports      CBC:   Results from last 7 days  Lab Units 05/06/18  1801   WBC Thousand/uL 7 83   RBC Million/uL 5 50   HEMOGLOBIN g/dL 15 8   HEMATOCRIT % 48 1   MCV fL 88   MCH pg 28 7   MCHC g/dL 32 8   RDW % 13 5   MPV fL 10 2   PLATELETS Thousands/uL 199   NRBC AUTO /100 WBCs 0   NEUTROS PCT % 66   LYMPHS PCT % 25   MONOS PCT % 6   EOS PCT % 3   BASOS PCT % 0   NEUTROS ABS Thousands/µL 5 11   LYMPHS ABS Thousands/µL 1 95   MONOS ABS Thousand/µL 0 50   EOS ABS Thousand/µL 0 21   , Chemistry Profile:   Results from last 7 days  Lab Units 05/06/18  1801   SODIUM mmol/L 140   POTASSIUM mmol/L 3 7   CHLORIDE mmol/L 106   CO2 mmol/L 29   ANION GAP mmol/L 5   BUN mg/dL 12   CREATININE mg/dL 1 13   GLUCOSE RANDOM mg/dL 101   CALCIUM mg/dL 8 7   AST U/L 22   ALT U/L 42   ALK PHOS U/L 73   TOTAL PROTEIN g/dL 7 4   BILIRUBIN TOTAL mg/dL 0 45   EGFR ml/min/1 73sq m 74   , Coagulation Studies:   , Cardiac Studies:   Results from last 7 days  Lab Units 05/06/18  1801   TROPONIN I ng/mL <0 02   , Additional Labs:   Results from last 7 days  Lab Units 05/06/18  1801   LIPASE u/L 142       Imaging: I have personally reviewed pertinent films in PACS  Ct Abdomen Pelvis With Contrast    Result Date: 5/6/2018  Narrative: CT ABDOMEN AND PELVIS WITH IV CONTRAST INDICATION:   Abdominal pain  COMPARISON: CT abdomen pelvis 7/17/2017 TECHNIQUE:  CT examination of the abdomen and pelvis was performed  Axial, sagittal, and coronal 2D reformatted images were created from the source data and submitted for interpretation  Radiation dose length product (DLP) for this visit:  497 49 mGy-cm   This examination, like all CT scans performed in the [facility], was performed utilizing techniques to minimize radiation dose exposure, including the use of iterative  reconstruction and automated exposure control  IV Contrast:  100 mL of iohexol (OMNIPAQUE) Enteric Contrast:  Enteric contrast was not administered  FINDINGS: ABDOMEN LOWER CHEST:  No clinically significant abnormality identified in the visualized lower chest  LIVER/BILIARY TREE:  Scattered tiny hepatic hypodensities are grossly similar, too small to characterize although likely to represent small cysts  No intrahepatic biliary dilatation  The hepatic and portal veins are patent  GALLBLADDER:  Gallbladder is surgically absent  SPLEEN:  Unremarkable  PANCREAS:  Moderate fatty involution without acute findings  ADRENAL GLANDS:  Unremarkable  KIDNEYS/URETERS:  Unremarkable  No hydronephrosis  STOMACH AND BOWEL:  Unremarkable  APPENDIX:  No findings to suggest appendicitis  ABDOMINOPELVIC CAVITY:  No ascites or free intraperitoneal air  No lymphadenopathy  VESSELS:  Unremarkable for patient's age  PELVIS REPRODUCTIVE ORGANS:  Unremarkable for patient's age  URINARY BLADDER:  Unremarkable  ABDOMINAL WALL/INGUINAL REGIONS:  Unremarkable  OSSEOUS STRUCTURES:  No acute fracture or destructive osseous lesion  Impression: No acute intra-abdominal abnormality   Workstation performed: [id number]       Microbiology: None    EKG, Pathology, and Other Studies: I have personally reviewed pertinent reports  Medications given in Emergency Department     Medication Administration - last 24 hours from 05/05/2018 2023 to 05/06/2018 2023       Date/Time Order Dose Route Action Action by     05/06/2018 9709 iohexol (OMNIPAQUE) 350 MG/ML injection (MULTI-DOSE) 100 mL 100 mL Intravenous Given Floyd Lara          Assessment and Plan     1  Atypical chest pain/epigastric pain  · Unlikely ACS given symptoms more consistent with GERD/duodenal ulcer  Heart score 3 with hyperlipidemia, family history of MI and obesity  Troponin x1 negative  EKG unremarkable  · Trend tropes x2 more  · Repeat EKG in a m  · Avoid NSAIDs  Will trial Mylanta p r n  as well as start PPI 20 mg b i d  as patient has symptoms both in the a m  and at night while lying flat  · Non ulcerogenic/cardiac diet  · No alarm symptoms such as weight loss, early satiety  Though given that patient is lower than 50 and has recurrence of his symptoms (though has not been on PPI) consider GI consult for possible inpatient EGD versus outpatient  NPO after midnight  · ASA and statin  Patient had A1c and lipid panel in 12/2017    2  Hyperlipidemia  · Continue statin    3  Hypothyroidism  · TSH 12/2017 was 2 89  Continue levothyroxine 137 mcg daily    4  Bilateral glaucoma  · Continue brimonidine, latanoprost, and timolol drops    5  Depression with anxiety/bipolar disorder  · Stable  Continue Seroquel 100 mg q h s , trazodone 100 mg q h s , and Ativan 1 mg daily    6  Constipation  · Continue Colace and polyethylene glycol p r n     7   Allergic rhinitis  · Continue Claritin and Flonase      Code Status: Level 1 - Full Code  VTE Pharmacologic Prophylaxis: Enoxaparin (Lovenox)   VTE Mechanical Prophylaxis: sequential compression device  Admission Status: OBSERVATION    Admission Time  I spent 30 minutes admitting the patient    This involved direct patient contact where I performed a full history and physical, reviewing previous records, and reviewing laboratory and other diagnostic studies      Jan Veliz MD  Internal Medicine  PGY-2

## 2018-05-08 PROBLEM — R10.13 EPIGASTRIC PAIN: Status: RESOLVED | Noted: 2018-05-06 | Resolved: 2018-05-08

## 2018-05-08 PROBLEM — R07.89 ATYPICAL CHEST PAIN: Status: RESOLVED | Noted: 2018-05-04 | Resolved: 2018-05-08

## 2018-05-14 ENCOUNTER — OFFICE VISIT (OUTPATIENT)
Dept: MULTI SPECIALTY CLINIC | Facility: CLINIC | Age: 53
End: 2018-05-14
Payer: COMMERCIAL

## 2018-05-14 VITALS
HEIGHT: 68 IN | WEIGHT: 182.98 LBS | BODY MASS INDEX: 27.73 KG/M2 | DIASTOLIC BLOOD PRESSURE: 80 MMHG | TEMPERATURE: 98.3 F | HEART RATE: 80 BPM | SYSTOLIC BLOOD PRESSURE: 104 MMHG

## 2018-05-14 DIAGNOSIS — K43.9 HERNIA OF ABDOMINAL WALL: ICD-10-CM

## 2018-05-14 PROBLEM — M62.08 DIASTASIS RECTI: Status: ACTIVE | Noted: 2018-05-14

## 2018-05-14 PROCEDURE — 1111F DSCHRG MED/CURRENT MED MERGE: CPT | Performed by: SURGERY

## 2018-05-14 PROCEDURE — 99202 OFFICE O/P NEW SF 15 MIN: CPT | Performed by: SURGERY

## 2018-05-14 NOTE — PROGRESS NOTES
Office Visit - General Surgery  Krys Pagan MRN: [id number]  Encounter: [id number]    Assessment and Plan  47 Y OM with r/o epigastric hernia referral from PCP  Pt has a diastasis recti and no hernia on PE  GERD/gastritis management per PCP  F/u prn  No need for any surgical intervention  Problem List Items Addressed This Visit     Hernia of abdominal wall          Chief Complaint:  Krys Pagan is a 47 y o  male who presents for Consult (New patient  Hernia in abdominal area)    Subjective  47 Y O M sent here referral from PCP to eval for abd wall hernia  Pt states has been having bloating, burping, acid reflux like symptoms  + bowel function  Has some abd wall bulging       Past Medical History  Past Medical History:   Diagnosis Date    Cardiomyopathy ([address] )     Hyperthyroidism     Thyroid disease        Past Surgical History  Past Surgical History:   Procedure Laterality Date    CHOLECYSTECTOMY LAPAROSCOPIC      last assessed: 4/23/15    EYE SURGERY         Family History  Family History   Problem Relation Age of Onset    Diabetes Mother     Hyperlipidemia Mother     Thyroid cancer Mother     Dementia Father     Coronary artery disease Maternal Grandmother     Hypertension Maternal Grandmother     Heart attack Maternal Grandmother        Medications  Current Outpatient Prescriptions on File Prior to Visit   Medication Sig Dispense Refill    acetaminophen (TYLENOL) 325 mg tablet Take 650 mg by mouth every 6 (six) hours as needed for mild pain      aspirin (ECOTRIN LOW STRENGTH) 81 mg EC tablet Take 81 mg by mouth daily      brimonidine (ALPHAGAN P) 0 15 % ophthalmic solution Apply 1 drop to eye 2 (two) times a day      Cholecalciferol (VITAMIN D3) 2000 units capsule Take 1 tablet by mouth daily      docusate sodium (COLACE) 100 mg capsule Take 1 capsule by mouth 2 (two) times a day 30 capsule 0    fluticasone (FLONASE) 50 mcg/act nasal spray 1 spray into each nostril daily  latanoprost (XALATAN) 0 005 % ophthalmic solution Apply 1 drop to eye daily      levothyroxine 125 mcg tablet Take 137 mcg by mouth daily        loratadine (CLARITIN) 10 mg tablet Take 10 mg by mouth daily   LORazepam (ATIVAN) 1 mg tablet Take 1 mg by mouth daily      lovastatin (MEVACOR) 10 MG tablet Take 1 tablet by mouth daily      pantoprazole (PROTONIX) 20 mg tablet Take 1 tablet (20 mg total) by mouth daily 30 tablet 0    polyethylene glycol (GLYCOLAX) powder Take 17 g by mouth daily  119 g 0    QUEtiapine (SEROQUEL) 100 mg tablet Take 100 mg by mouth daily at bedtime   timolol (TIMOPTIC) 0 5 % ophthalmic solution Apply 1 drop to eye every morning      traZODone (DESYREL) 100 mg tablet Take 100 mg by mouth daily at bedtime  No current facility-administered medications on file prior to visit  Allergies  Allergies   Allergen Reactions    Other        Review of Systems    Objective  Vitals:    05/14/18 1032   BP: 104/80   Pulse: 80   Temp: 98 3 °F (36 8 °C)       Physical Exam   Gen: AOAx3  Lungs: CTA B/L  Abd: soft, NT, midline abd recti diastasis  No identifiable hernia

## 2018-05-14 NOTE — ASSESSMENT & PLAN NOTE
47 Y OM with r/o epigastric hernia referral from PCP  Pt has a diastasis recti and no hernia on PE  GERD/gastritis management per PCP  F/u prn  No need for any surgical intervention

## 2018-05-23 ENCOUNTER — APPOINTMENT (OUTPATIENT)
Dept: LAB | Facility: CLINIC | Age: 53
End: 2018-05-23
Payer: COMMERCIAL

## 2018-05-23 DIAGNOSIS — Z79.899 ENCOUNTER FOR LONG-TERM (CURRENT) USE OF MEDICATIONS: Primary | ICD-10-CM

## 2018-05-23 DIAGNOSIS — F20.3 SCHIZOPHRENIA, ACUTE UNDIFFERENTIATED (HCC): ICD-10-CM

## 2018-05-23 LAB
25(OH)D3 SERPL-MCNC: 21.5 NG/ML (ref 30–100)
ALBUMIN SERPL BCP-MCNC: 3.5 G/DL (ref 3.5–5)
ALP SERPL-CCNC: 69 U/L (ref 46–116)
ALT SERPL W P-5'-P-CCNC: 46 U/L (ref 12–78)
ANION GAP SERPL CALCULATED.3IONS-SCNC: 6 MMOL/L (ref 4–13)
AST SERPL W P-5'-P-CCNC: 23 U/L (ref 5–45)
BASOPHILS # BLD AUTO: 0.03 THOUSANDS/ΜL (ref 0–0.1)
BASOPHILS NFR BLD AUTO: 0 % (ref 0–1)
BILIRUB SERPL-MCNC: 0.4 MG/DL (ref 0.2–1)
BUN SERPL-MCNC: 16 MG/DL (ref 5–25)
CALCIUM SERPL-MCNC: 8.7 MG/DL (ref 8.3–10.1)
CHLORIDE SERPL-SCNC: 104 MMOL/L (ref 100–108)
CHOLEST SERPL-MCNC: 192 MG/DL (ref 50–200)
CO2 SERPL-SCNC: 28 MMOL/L (ref 21–32)
CREAT SERPL-MCNC: 1.29 MG/DL (ref 0.6–1.3)
EOSINOPHIL # BLD AUTO: 0.51 THOUSAND/ΜL (ref 0–0.61)
EOSINOPHIL NFR BLD AUTO: 7 % (ref 0–6)
ERYTHROCYTE [DISTWIDTH] IN BLOOD BY AUTOMATED COUNT: 14.3 % (ref 11.6–15.1)
EST. AVERAGE GLUCOSE BLD GHB EST-MCNC: 123 MG/DL
FERRITIN SERPL-MCNC: 106 NG/ML (ref 8–388)
FOLATE SERPL-MCNC: 15.2 NG/ML (ref 3.1–17.5)
GFR SERPL CREATININE-BSD FRML MDRD: 63 ML/MIN/1.73SQ M
GGT SERPL-CCNC: 48 U/L (ref 5–85)
GLUCOSE P FAST SERPL-MCNC: 96 MG/DL (ref 65–99)
GLUCOSE P FAST SERPL-MCNC: 96 MG/DL (ref 65–99)
HBA1C MFR BLD: 5.9 % (ref 4.2–6.3)
HCT VFR BLD AUTO: 46.9 % (ref 36.5–49.3)
HDLC SERPL-MCNC: 43 MG/DL (ref 40–60)
HGB BLD-MCNC: 15.6 G/DL (ref 12–17)
IRON SATN MFR SERPL: 26 %
IRON SERPL-MCNC: 76 UG/DL (ref 65–175)
LDLC SERPL CALC-MCNC: 79 MG/DL (ref 0–100)
LYMPHOCYTES # BLD AUTO: 2.01 THOUSANDS/ΜL (ref 0.6–4.47)
LYMPHOCYTES NFR BLD AUTO: 29 % (ref 14–44)
MCH RBC QN AUTO: 28.7 PG (ref 26.8–34.3)
MCHC RBC AUTO-ENTMCNC: 33.3 G/DL (ref 31.4–37.4)
MCV RBC AUTO: 86 FL (ref 82–98)
MONOCYTES # BLD AUTO: 0.57 THOUSAND/ΜL (ref 0.17–1.22)
MONOCYTES NFR BLD AUTO: 8 % (ref 4–12)
NEUTROPHILS # BLD AUTO: 3.71 THOUSANDS/ΜL (ref 1.85–7.62)
NEUTS SEG NFR BLD AUTO: 54 % (ref 43–75)
NONHDLC SERPL-MCNC: 149 MG/DL
NRBC BLD AUTO-RTO: 0 /100 WBCS
PLATELET # BLD AUTO: 209 THOUSANDS/UL (ref 149–390)
PMV BLD AUTO: 10.7 FL (ref 8.9–12.7)
POTASSIUM SERPL-SCNC: 3.7 MMOL/L (ref 3.5–5.3)
PROT SERPL-MCNC: 7.3 G/DL (ref 6.4–8.2)
RBC # BLD AUTO: 5.44 MILLION/UL (ref 3.88–5.62)
RETICS # AUTO: NORMAL 10*3/UL (ref 14356–105094)
RETICS # CALC: 1.44 % (ref 0.37–1.87)
SODIUM SERPL-SCNC: 138 MMOL/L (ref 136–145)
T3 SERPL-MCNC: 0.7 NG/ML (ref 0.6–1.8)
T4 FREE SERPL-MCNC: 1 NG/DL (ref 0.76–1.46)
TIBC SERPL-MCNC: 288 UG/DL (ref 250–450)
TRANSFERRIN SERPL-MCNC: 223 MG/DL (ref 200–400)
TRIGL SERPL-MCNC: 349 MG/DL
TSH SERPL DL<=0.05 MIU/L-ACNC: 5.16 UIU/ML (ref 0.36–3.74)
VIT B12 SERPL-MCNC: 206 PG/ML (ref 100–900)
WBC # BLD AUTO: 6.86 THOUSAND/UL (ref 4.31–10.16)

## 2018-05-23 PROCEDURE — 82977 ASSAY OF GGT: CPT

## 2018-05-23 PROCEDURE — 82306 VITAMIN D 25 HYDROXY: CPT

## 2018-05-23 PROCEDURE — 80074 ACUTE HEPATITIS PANEL: CPT

## 2018-05-23 PROCEDURE — 82607 VITAMIN B-12: CPT

## 2018-05-23 PROCEDURE — 82951 GLUCOSE TOLERANCE TEST (GTT): CPT

## 2018-05-23 PROCEDURE — 83540 ASSAY OF IRON: CPT

## 2018-05-23 PROCEDURE — 36415 COLL VENOUS BLD VENIPUNCTURE: CPT

## 2018-05-23 PROCEDURE — 80061 LIPID PANEL: CPT

## 2018-05-23 PROCEDURE — 82728 ASSAY OF FERRITIN: CPT

## 2018-05-23 PROCEDURE — 84439 ASSAY OF FREE THYROXINE: CPT

## 2018-05-23 PROCEDURE — 84480 ASSAY TRIIODOTHYRONINE (T3): CPT

## 2018-05-23 PROCEDURE — 82746 ASSAY OF FOLIC ACID SERUM: CPT

## 2018-05-23 PROCEDURE — 85045 AUTOMATED RETICULOCYTE COUNT: CPT

## 2018-05-23 PROCEDURE — 84466 ASSAY OF TRANSFERRIN: CPT

## 2018-05-23 PROCEDURE — 80053 COMPREHEN METABOLIC PANEL: CPT

## 2018-05-23 PROCEDURE — 83036 HEMOGLOBIN GLYCOSYLATED A1C: CPT

## 2018-05-23 PROCEDURE — 85025 COMPLETE CBC W/AUTO DIFF WBC: CPT

## 2018-05-23 PROCEDURE — 84443 ASSAY THYROID STIM HORMONE: CPT

## 2018-05-23 PROCEDURE — 83550 IRON BINDING TEST: CPT

## 2018-05-24 ENCOUNTER — OFFICE VISIT (OUTPATIENT)
Dept: INTERNAL MEDICINE CLINIC | Facility: CLINIC | Age: 53
End: 2018-05-24
Payer: COMMERCIAL

## 2018-05-24 VITALS
HEIGHT: 68 IN | SYSTOLIC BLOOD PRESSURE: 100 MMHG | TEMPERATURE: 98 F | DIASTOLIC BLOOD PRESSURE: 70 MMHG | HEART RATE: 84 BPM | BODY MASS INDEX: 28.2 KG/M2 | WEIGHT: 186.07 LBS

## 2018-05-24 DIAGNOSIS — E78.1 HYPERTRIGLYCERIDEMIA: Primary | ICD-10-CM

## 2018-05-24 DIAGNOSIS — E03.9 HYPOTHYROIDISM, UNSPECIFIED TYPE: ICD-10-CM

## 2018-05-24 DIAGNOSIS — K21.9 GASTROESOPHAGEAL REFLUX DISEASE, ESOPHAGITIS PRESENCE NOT SPECIFIED: ICD-10-CM

## 2018-05-24 DIAGNOSIS — R10.13 EPIGASTRIC ABDOMINAL PAIN: ICD-10-CM

## 2018-05-24 DIAGNOSIS — M62.08 DIASTASIS RECTI: ICD-10-CM

## 2018-05-24 DIAGNOSIS — E53.8 LOW VITAMIN B12 LEVEL: ICD-10-CM

## 2018-05-24 PROBLEM — R79.89 LOW VITAMIN B12 LEVEL: Status: ACTIVE | Noted: 2018-05-24

## 2018-05-24 LAB
HAV IGM SER QL: NORMAL
HBV CORE IGM SER QL: NORMAL
HBV SURFACE AG SER QL: NORMAL
HCV AB SER QL: NORMAL

## 2018-05-24 PROCEDURE — 99213 OFFICE O/P EST LOW 20 MIN: CPT | Performed by: INTERNAL MEDICINE

## 2018-05-24 NOTE — PATIENT INSTRUCTIONS
Diástasis de rectos   CUIDADO AMBULATORIO:   La diástasis de rectos (DR)  sucede cuando los músculos del abdomen se separan  Es cara condición común boogie el [place] y después del parto  Los síntomas más comunes incluyen los siguientes:   · Cara protuberancia en el centro del abdomen que se hace más twyla al toser, estornudar o sentarse    · Dolor en el abdomen, la pelvis o la espalda    · Dificultad para controlar la orina o las evacuaciones intestinales  Busque atención médica de inmediato si:   · Usted tiene dolor intenso en el abdomen o pelvis  · Tiene un aumento repentino en el tamaño del bulto y no lo puede regresar a sanz lugar  · Usted vomita mariusz  · Tiene evacuaciones intestinales negras o encuentra mariusz en henrry evacuaciones intestinales  Pregúntele a sanz [place] vitaminas y minerales son adecuados para usted  · Usted tiene náuseas o está vomitando  · Usted tiene problemas para controlar la orina o las evacuaciones intestinales  · Usted tiene preguntas o inquietudes acerca de snaz condición o cuidado  El tratamiento  podría no ser necesario  La fisioterapia puede ayudar a fortalecer los músculos abdominales  En raras ocasiones, se necesita cirugía para reparar los músculos  Usted podría  necesitar lo siguiente:  · AINEs (Analgésicos antiinflamatorios no esteroides) rebecca el ibuprofeno, ayudan a disminuir la inflamación, el dolor y la fiebre  Jhonny medicamento esta disponible con o sin cara receta médica  Los AINEs pueden causar sangrado estomacal o problemas renales en ciertas personas  Si usted bal un medicamento anticoagulante, siempre pregúntele a sanz médico si los RUTHIE son seguros para usted  Siempre nyasia la etiqueta de jhonny medicamento y [place] instrucciones  · [employer] henrry medicamentos rebecca se le haya indicado  Consulte con sanz médico si usted jl que sanz medicamento no le está ayudando o si presenta efectos secundarios  Infórmele si es alérgico a algún medicamento   [place]oms lista actualizada de [employer], las vitaminas y los productos herbales que bal  Incluya los siguientes datos de los medicamentos: cantidad, frecuencia y motivo de administración  Traiga con usted la lista o los envases de la píldoras a henrry citas de seguimiento  Lleve la lista de los medicamentos con usted en danae de cara emergencia  Cuidados personales:   · No levante nada que pese más que 10 libras  [place] puede aumentar henrry síntomas  · Ousmane ejercicios abdominales según las indicaciones  Los ejercicios abdominales pueden ayudar a fortalecer henrry músculos y a disminuir los síntomas  Si es moshe, no ousmane ejercicios abdominales boogie al menos 2 semanas después del [place]  · Use cara faja rebecca se le indique  [place] Lulas puede ayudar a sostener los músculos y disminuir el dolor  Pregúntele a gage médico dónde comprar cara faja  · Mantenga un peso saludable  Combs podría ayudar a evitar que la DR [employer]  Consulte con gage médico cuánto debería pesar  Pida que le ayude a crear un plan para bajar de peso si usted tiene sobrepeso  Acuda a henrry consultas de control con gage médico según le indicaron  Anote henrry preguntas para que se acuerde de hacerlas boogie henrry visitas  © 2017 [address] Information is for End User's use only and may not be sold, redistributed or otherwise used for commercial purposes  All illustrations and images included in CareNotes® are the copyrighted property of A D A M , Inc  or Kory Ny  Esta información es sólo para uso en educación  Gage intención no es darle un consejo médico sobre enfermedades o tratamientos  Colsulte con gage Star Omi farmacéutico antes de seguir cualquier régimen médico para saber si es seguro y efectivo para usted  Helicobacter pylori   CUIDADO AMBULATORIO:   El Helicobacter pylori (H  pylori)  es cara clase de bacteria que causa cara infección en el revestimiento del estómago y parte chaparrita del intestino   Eneida generalmente contraen la infección bacteriana en la niñez, hemanth no presentan síntomas hasta la edad adulta  Podría propagarse al moise agua sucia o consumir alimentos que no están limpios o jesus cocinados  Signos y síntomas de cara infección por H  pylori:  La mayoría de las personas que contraen la infección por H  pylori nunca tienen síntomas  En danae de presentar síntomas, éstos pueden ir y venir, y durar de unos minutos a horas  Es posible que se sienta mejor por un corto tiempo después de comer un alimento o moise sanz medicamento  Puede presentar cualquiera de los siguientes signos o síntomas:  · Dolor sordo o quemante en sanz estómago o garganta    · Náuseas, vómito, sensación de llenura o eruptar    · Pérdida del apetito o de peso    · Dolor en la noche o cuando tiene sanz estómago vacío  Busque atención médica de inmediato si:   · Usted tiene evacuaciones intestinales sanguinolentas, vómito con mariusz, o vómito parecido al café molido  · Usted de repente tiene un dolor de estómago beata que no desaparece o se propaga a sanz espalda  Pregúntele a sanz Liliana Diesel vitaminas y minerales son adecuados para usted  · Liliana síntomas no mejoran con el tratamiento  · Usted pierde peso sin proponérselo  · Usted tiene sensación de llenura después de comer solo cara pequeña cantidad de comida  · Usted tiene preguntas o inquietudes acerca de sanz condición o cuidado  Medicamentos:  No tome ningún medicamento que contenga aspirina o ibuprofeno  Estos medicamentos pueden provocar sangrado estomacal  Es posible que usted necesite alguno de los siguientes:  · Antibióticos  ayudan a combatir la bacteria  Es posible que necesite moise jhonny medicamento boogie 10 a 14 días  Sanz médico le recetará por lo menos 2 antibióticos al [employer]  · Los medicamentos contra las úlceras  ayudan a disminuir la cantidad de ácido que normalmente es producido por el estómago   Estos medicamentos ayudan a aliviar el dolor y [place] o prevenir las Jake  · El bismuto  es un líquido o comprimido que se puede utilizar para disminuir la acidez,el malestar estomacal, o la diarrea  También puede disminuir la inflamación del estómago y ayudar a combatir la infección si otros medicamentos no [place] funcionado  [place] también puede proteger las úlceras del ácido estomacal para que puedan sanar  Maneje o prevenga cara infección por H  pylori:   · Lávese jesus las mano con agua tibia y [place]  Lávese las solis antes de comer y después de ir al baño  · Maneje los alimentos adecuadamente  Enjuague los alimentos muy jesus antes de cocinarlos o consumirlos  Cocine jesus los alimentos  El manejo adecuado ayudará a aniquilar cualquier bacteria que pueda estar en los alimentos  · Catarina agua no contaminada de un sitio seguro  Solo catarina agua que ha sido filtrada o purificada  · Pregunte [address] [place]  Cuando no se jose guadalupe de la [employer], los medicamentos antiinflamatorios no esteroides pueden causar sangrado estomacal y problemas renales  Gage médico le puede solicitar que evite los analgésicos debido a que estos pueden empeorar henrry síntomas  · No fume  La nicotina y otros químicos en cigarrillos y cigarros podría empeorar henrry síntomas y [place] causar daño a los pulmones  Pida información a gage médico si usted actualmente fuma y necesita ayuda para dejar de fumar  Los cigarrillos electrónicos o tabaco sin humo todavía contienen nicotina  Consulte con gage médico antes de [employer]  · No consuma alcohol  El alcohol podría empeorar henrry síntomas de [place]  Acuda a henrry consultas de control con gage médico según le indicaron  Es posible que necesite que le yesenia más exámenes después del tratamiento para revisar si gage infección osman desaparecido  Podría necesitar diferentes antibióticos para combatir la infección  Anote henrry preguntas para que se acuerde de hacerlas boogie henrry visitas    © 2017 [address] [employer] Information is for End User's use only and may not be sold, redistributed or otherwise used for commercial purposes  All illustrations and images included in CareNotes® are the copyrighted property of A D A M , Inc  or Kory Ny  Esta información es sólo para uso en educación  Sanz intención no es darle un consejo médico sobre enfermedades o tratamientos  Colsulte con sanz Bary Ricky farmacéutico antes de seguir cualquier régimen médico para saber si es seguro y efectivo para usted

## 2018-05-24 NOTE — PROGRESS NOTES
CLINIC VISIT NOTE  Olga Perez 47 y o  male   MRN: [id number]    ASSESSMENT/PLAN:  Diagnoses and all orders for this visit:    1  Hypertriglyceridemia  · Triglycerides 349 on recent lipid panel, remainder of lipids within normal ranges  · Continue lovastatin  · Advised dietary management as well  · Consider starting Zetia at next follow-up if appropriate    2  Diastasis recti  · Evaluated by surgery and no evidence of hernia on imaging  · No surgical intervention or follow-up necessary at this time  · Advised continuing to use abdominal belt  · Will address GI complaints as noted below  · Follow-up in 4 weeks or sooner for re-evaluation if continues to enlarge    3  Gastroesophageal reflux disease, esophagitis presence not specified  · Started on Protonix recently  · Reportedly has history of EGD in past but there is no record and patient unable to recall what was seen  · See below work-up for abdominal pain  · Ambulatory referral to Gastroenterology; Future    4  Hypothyroidism, unspecified type  · TSH elevated but free T4 and total T3 within normal range  · Continue levothyroxine    5  Epigastric abdominal pain  · In setting of melena (which patient endorsed), and constellation of symptoms, will rule-out H pylori infection  · Instructed patient to hold Protonix for 1 week before doing stool antigen testing  · Advised patient to avoid all NSAIDs except for aspirin; patient was recently prescribed Naproxen, which I will discontinue especially in setting of melena  · Will also refer to GI for EGD given melena and continued epigastric abdominal pain  · Follow-up in 4 weeks for re-eval of symptoms and for results of testing if available; can also address need for screening colonoscopy at that time  · H  pylori antigen, stool; Future  · Ambulatory referral to Gastroenterology; Future    6   Low vitamin B12 level  · Low B12 level in 200s on recent blood work  · Will provide oral supplementation with vitamin B-12 (CYANOCOBALAMIN) 250 MCG tablet; Take 1 tablet (250 mcg total) by mouth daily      Health Maintenance: Address need for colonoscopy at next appointment in 4 weeks  UTD with Tdap  Patient had extensive lab studies done yesterday    Schedule a follow-up appointment in 4 weeks for re-eval of abdominal pain, follow-up of h pylori testing results, and if patient has seen GI for possible EGD  Chief Complaint: Follow-up of chronic conditions, diastasis recti, abdominal pain    History of Present Illness:  47year old male presents to office for follow-up after surgical eval of suspected abdominal wall hernia, complaint of ongoing abdominal pain  Translation obtained using phone  service  Patient reports that his epigastric abdominal pain is better after starting on Protonix but he still gets the pain  Pain is worst in the subxiphoid/epigastrium  The pain comes and goes without clear exacerbating factors  It's been present for several years  He reports that he had an EGD several years ago here but there is no record of it on the EHR  He does endorse "black stools" at times  Hemoglobin is within normal limits on recent blood work  Patient was evaluated by surgery for possible hernia repair but found to have diastasis recti and not surgical intervention at this time  Patient is concerned about the diastasis and is constantly wearing an abdominal belt to keep the bulging down  He states it is getting worse  He does endorse constipation but takes a stool softener which allows him to go to the bathroom  He offers no other acute complaints at this time  Review of Systems   Gastrointestinal: Positive for abdominal pain (Epigastric) and constipation  Melena, bulging of abdomen with laying or bearing down   All other systems reviewed and are negative        Objective:  Vitals:    05/24/18 1318   BP: 100/70   Pulse: 84   Temp: 98 °F (36 7 °C)   Weight: 84 4 kg (186 lb 1 1 oz)   Height: 5' 8" (1 727 m) Physical Exam   Constitutional: He is oriented to person, place, and time  He appears well-developed and well-nourished  No distress  HENT:   Head: Normocephalic and atraumatic  Nose: Nose normal    Mouth/Throat: Oropharynx is clear and moist  No oropharyngeal exudate  Eyes: EOM are normal  No scleral icterus  Neck: Neck supple  No JVD present  No tracheal deviation present  Cardiovascular: Normal rate, regular rhythm, normal heart sounds and intact distal pulses  Pulmonary/Chest: Effort normal and breath sounds normal  No respiratory distress  He has no wheezes  He has no rales  Abdominal: Soft  Bowel sounds are normal  He exhibits distension  He exhibits no mass  There is tenderness (Mild tenderness in epigastrium with deep palpation)  There is no rebound and no guarding  No hernia  +diastasis recti, changes in size with supine position, bearing down, reducible   Musculoskeletal: He exhibits no edema or deformity  Neurological: He is alert and oriented to person, place, and time  No cranial nerve deficit  Skin: Skin is warm and dry  No rash noted  He is not diaphoretic  No erythema  Psychiatric: He has a normal mood and affect  His behavior is normal  Judgment and thought content normal    Nursing note and vitals reviewed          Current Outpatient Prescriptions:     aspirin (ECOTRIN LOW STRENGTH) 81 mg EC tablet, Take 81 mg by mouth daily, Disp: , Rfl:     brimonidine (ALPHAGAN P) 0 15 % ophthalmic solution, Apply 1 drop to eye 2 (two) times a day, Disp: , Rfl:     fluticasone (FLONASE) 50 mcg/act nasal spray, 1 spray into each nostril daily, Disp: , Rfl:     latanoprost (XALATAN) 0 005 % ophthalmic solution, Apply 1 drop to eye daily, Disp: , Rfl:     levothyroxine 125 mcg tablet, Take 137 mcg by mouth daily  , Disp: , Rfl:     LORazepam (ATIVAN) 1 mg tablet, Take 1 mg by mouth daily, Disp: , Rfl:     lovastatin (MEVACOR) 10 MG tablet, Take 1 tablet by mouth daily, Disp: , Rfl:   pantoprazole (PROTONIX) 20 mg tablet, Take 1 tablet (20 mg total) by mouth daily, Disp: 30 tablet, Rfl: 0    QUEtiapine (SEROQUEL) 100 mg tablet, Take 100 mg by mouth daily at bedtime  , Disp: , Rfl:     timolol (TIMOPTIC) 0 5 % ophthalmic solution, Apply 1 drop to eye every morning, Disp: , Rfl:     traZODone (DESYREL) 100 mg tablet, Take 100 mg by mouth daily at bedtime  , Disp: , Rfl:     acetaminophen (TYLENOL) 325 mg tablet, Take 650 mg by mouth every 6 (six) hours as needed for mild pain, Disp: , Rfl:     Cholecalciferol (VITAMIN D3) 2000 units capsule, Take 1 tablet by mouth daily, Disp: , Rfl:     docusate sodium (COLACE) 100 mg capsule, Take 1 capsule by mouth 2 (two) times a day, Disp: 30 capsule, Rfl: 0    loratadine (CLARITIN) 10 mg tablet, Take 10 mg by mouth daily  , Disp: , Rfl:     polyethylene glycol (GLYCOLAX) powder, Take 17 g by mouth daily  , Disp: 119 g, Rfl: 0    Past Medical History:   Diagnosis Date    Cardiomyopathy ([facility] [address] )     Hyperthyroidism     Thyroid disease      Past Surgical History:   Procedure Laterality Date    CHOLECYSTECTOMY LAPAROSCOPIC      last assessed: 4/23/15    EYE SURGERY       Social History     Social History    Marital status: Single     Spouse name: N/A    Number of children: N/A    Years of education: N/A     Occupational History    Not on file       Social History Main Topics    Smoking status: Never Smoker    Smokeless tobacco: Never Used    Alcohol use Yes      Comment: social    Drug use: No    Sexual activity: Not on file     Other Topics Concern    Not on file     Social History Narrative    Occasional caffeine use    Sedentary lifestyle         Family History   Problem Relation Age of Onset    Diabetes Mother     Hyperlipidemia Mother     Thyroid cancer Mother     Dementia Father     Coronary artery disease Maternal Grandmother     Hypertension Maternal Grandmother     Heart attack Maternal Grandmother        ==  DO Angela Nicholson  [facility] PGY-1    [address] [facility] , [address] [address] [facility] [address] [facility]  Office: [phone]  Fax: [phone]

## 2018-06-01 DIAGNOSIS — K21.9 GASTROESOPHAGEAL REFLUX DISEASE, ESOPHAGITIS PRESENCE NOT SPECIFIED: ICD-10-CM

## 2018-06-04 RX ORDER — PANTOPRAZOLE SODIUM 20 MG/1
20 TABLET, DELAYED RELEASE ORAL DAILY
Qty: 30 TABLET | Refills: 6 | Status: SHIPPED | OUTPATIENT
Start: 2018-06-04 | End: 2018-10-24 | Stop reason: SDUPTHER

## 2018-06-11 ENCOUNTER — APPOINTMENT (OUTPATIENT)
Dept: LAB | Facility: HOSPITAL | Age: 53
End: 2018-06-11
Payer: COMMERCIAL

## 2018-06-11 DIAGNOSIS — R10.13 EPIGASTRIC ABDOMINAL PAIN: ICD-10-CM

## 2018-06-11 PROCEDURE — 87338 HPYLORI STOOL AG IA: CPT

## 2018-06-12 LAB — H PYLORI AG STL QL IA: NEGATIVE

## 2018-06-25 ENCOUNTER — OFFICE VISIT (OUTPATIENT)
Dept: INTERNAL MEDICINE CLINIC | Facility: CLINIC | Age: 53
End: 2018-06-25
Payer: COMMERCIAL

## 2018-06-25 VITALS
HEIGHT: 68 IN | SYSTOLIC BLOOD PRESSURE: 108 MMHG | TEMPERATURE: 98.2 F | HEART RATE: 88 BPM | WEIGHT: 176.81 LBS | DIASTOLIC BLOOD PRESSURE: 84 MMHG | BODY MASS INDEX: 26.8 KG/M2

## 2018-06-25 DIAGNOSIS — M25.562 KNEE PAIN, BILATERAL: ICD-10-CM

## 2018-06-25 DIAGNOSIS — M62.08 DIASTASIS RECTI: ICD-10-CM

## 2018-06-25 DIAGNOSIS — M25.561 KNEE PAIN, BILATERAL: ICD-10-CM

## 2018-06-25 DIAGNOSIS — T81.30XA: ICD-10-CM

## 2018-06-25 DIAGNOSIS — R14.0 BLOATING: ICD-10-CM

## 2018-06-25 DIAGNOSIS — E03.9 HYPOTHYROIDISM, UNSPECIFIED TYPE: ICD-10-CM

## 2018-06-25 DIAGNOSIS — Z23 NEED FOR PNEUMOCOCCAL VACCINATION: Primary | ICD-10-CM

## 2018-06-25 DIAGNOSIS — K59.00 CONSTIPATION, UNSPECIFIED CONSTIPATION TYPE: ICD-10-CM

## 2018-06-25 DIAGNOSIS — R05.9 COUGH: ICD-10-CM

## 2018-06-25 PROBLEM — K43.9 HERNIA OF ABDOMINAL WALL: Status: RESOLVED | Noted: 2018-05-04 | Resolved: 2018-06-25

## 2018-06-25 PROCEDURE — 90471 IMMUNIZATION ADMIN: CPT | Performed by: INTERNAL MEDICINE

## 2018-06-25 PROCEDURE — 3725F SCREEN DEPRESSION PERFORMED: CPT | Performed by: INTERNAL MEDICINE

## 2018-06-25 PROCEDURE — 90732 PPSV23 VACC 2 YRS+ SUBQ/IM: CPT | Performed by: INTERNAL MEDICINE

## 2018-06-25 PROCEDURE — 99213 OFFICE O/P EST LOW 20 MIN: CPT | Performed by: INTERNAL MEDICINE

## 2018-06-25 RX ORDER — LORATADINE 10 MG/1
10 TABLET ORAL DAILY
Qty: 30 TABLET | Refills: 3 | Status: SHIPPED | OUTPATIENT
Start: 2018-06-25 | End: 2018-09-05 | Stop reason: SDUPTHER

## 2018-06-25 RX ORDER — SIMETHICONE 125 MG
125 CAPSULE ORAL EVERY 8 HOURS PRN
Qty: 90 CAPSULE | Refills: 2 | Status: SHIPPED | OUTPATIENT
Start: 2018-06-25 | End: 2018-08-13 | Stop reason: SDUPTHER

## 2018-06-25 NOTE — PROGRESS NOTES
ASSESSMENT/PLAN:  Diagnoses and all orders for this visit:    Need for pneumococcal vaccination  -     Pneumococcal polysaccharide vaccine 23-valent greater than or equal to 3yo subcutaneous/IM  -     diclofenac sodium (VOLTAREN) 1 %; Apply 2 g topically 4 (four) times a day    Knee pain, bilateral    Bloating  -     simethicone (MYLICON,GAS-X) 806 MG CAPS; Take 1 capsule (125 mg total) by mouth every 8 (eight) hours as needed for flatulence (stomach gas)    Abdominal wall dehiscence  -     Abdominal binder    Cough  -     loratadine (CLARITIN) 10 mg tablet; Take 1 tablet (10 mg total) by mouth daily    Constipation, unspecified constipation type    Hypothyroidism, unspecified type    Diastasis recti      Plan:  Epigastric abdominal pain/bloating/abdominal distention   ? In setting of previous melena - denies further melena  ? H  Pylori negative off PPI for one week  ? PPI has been resumed  ? Advised patient to avoid all NSAIDs except for aspirin; patient was recently prescribed Naproxen and Meloxicam (for knee pain), which was recommended to be discontinued - has been taken Meloxicam- advised him to stop  Will prescribe Voltaren gel today  He also sees Ortho for knee injections  ? Has appointment with Gi on 8/16/18 - may need EGD  ? Suspect distention/bloating is 2/2 constipation, possible IBS  Continue daily constipation medications as he only gets constipated when he does not take them  Will also prescribe gas-x  Abdominal wall dehiscence- He does not have an abdominal hernia, he has weakness of his abdominal wall muscles  Will prescribe a new abdominal brace for him  Per CT abd from 5/2018, confirms exam from today that no hernia is present        Health Maintenance:  Needs PPSV vaccination- got today in office  Patient states he had a colonoscopy about 3 years ago at [facility] that was normal- will attempt to get report,  not currently uploaded in chart    RTC in  3 months    CHIEF COMPLAINT: Epigastric pain    HISTORY OF PRESENT ILLNESS:  Patient here for 1 month follow up of abdominal pain  The epigastric pain has improved  He does c/o bloating and abdominal distention  H pylori was negative  Occasional constipation if he does not take his medications  He denies current melena/blood in stools  He denies nausea/vomiting as well  Review of Systems   Constitutional: Negative for unexpected weight change  Respiratory: Negative for shortness of breath  Gastrointestinal: Positive for abdominal distention, abdominal pain and constipation  Negative for blood in stool, diarrhea, nausea and vomiting  Genitourinary: Negative for dysuria  OBJECTIVE:  Vitals:    06/25/18 1322   BP: 108/84   BP Location: Left arm   Patient Position: Sitting   Cuff Size: Adult   Pulse: 88   Temp: 98 2 °F (36 8 °C)   TempSrc: Oral   Weight: 80 2 kg (176 lb 12 9 oz)   Height: 5' 8" (1 727 m)       Physical Exam   GEN: AAOx3, NAD  HEENT: PEERL, MM moist  No scleral icterus  CV: RRR, nml S1/S2, no murmur appreciated  Lungs: CTA bilaterally, no w/r/r  Abd: Soft, distended, tympanic  Slightly tender around umbilical area  Weakness of abdominal rectus muscles (protrude out when he bears down)  Hypoactive bowel sounds  Non-peritonitic     Neuro: No focal deficits        Current Outpatient Prescriptions:     acetaminophen (TYLENOL) 325 mg tablet, Take 650 mg by mouth every 6 (six) hours as needed for mild pain, Disp: , Rfl:     aspirin (ECOTRIN LOW STRENGTH) 81 mg EC tablet, Take 81 mg by mouth daily, Disp: , Rfl:     brimonidine (ALPHAGAN P) 0 15 % ophthalmic solution, Apply 1 drop to eye 2 (two) times a day, Disp: , Rfl:     Cholecalciferol (VITAMIN D3) 2000 units capsule, Take 1 tablet by mouth daily, Disp: , Rfl:     fluticasone (FLONASE) 50 mcg/act nasal spray, 1 spray into each nostril daily, Disp: , Rfl:     latanoprost (XALATAN) 0 005 % ophthalmic solution, Apply 1 drop to eye daily, Disp: , Rfl:     levothyroxine 125 mcg tablet, Take 137 mcg by mouth daily  , Disp: , Rfl:     loratadine (CLARITIN) 10 mg tablet, Take 1 tablet (10 mg total) by mouth daily, Disp: 30 tablet, Rfl: 3    LORazepam (ATIVAN) 1 mg tablet, Take 1 mg by mouth daily, Disp: , Rfl:     lovastatin (MEVACOR) 10 MG tablet, Take 1 tablet by mouth daily, Disp: , Rfl:     pantoprazole (PROTONIX) 20 mg tablet, Take 1 tablet (20 mg total) by mouth daily, Disp: 30 tablet, Rfl: 6    polyethylene glycol (GLYCOLAX) powder, Take 17 g by mouth daily  , Disp: 119 g, Rfl: 0    QUEtiapine (SEROQUEL) 100 mg tablet, Take 100 mg by mouth daily at bedtime  , Disp: , Rfl:     timolol (TIMOPTIC) 0 5 % ophthalmic solution, Apply 1 drop to eye every morning, Disp: , Rfl:     traZODone (DESYREL) 100 mg tablet, Take 100 mg by mouth daily at bedtime  , Disp: , Rfl:     vitamin B-12 (CYANOCOBALAMIN) 250 MCG tablet, Take 1 tablet (250 mcg total) by mouth daily, Disp: 90 tablet, Rfl: 3    diclofenac sodium (VOLTAREN) 1 %, Apply 2 g topically 4 (four) times a day, Disp: 1 Tube, Rfl: 0    docusate sodium (COLACE) 100 mg capsule, Take 1 capsule by mouth 2 (two) times a day, Disp: 30 capsule, Rfl: 0    simethicone (MYLICON,GAS-X) 542 MG CAPS, Take 1 capsule (125 mg total) by mouth every 8 (eight) hours as needed for flatulence (stomach gas), Disp: 90 capsule, Rfl: 2    Past Medical History:   Diagnosis Date    Cardiomyopathy ([facility] [address] )     Hyperthyroidism     Thyroid disease      Past Surgical History:   Procedure Laterality Date    CHOLECYSTECTOMY LAPAROSCOPIC      last assessed: 4/23/15    EYE SURGERY       Social History     Social History    Marital status: Single     Spouse name: N/A    Number of children: N/A    Years of education: N/A     Occupational History    Not on file       Social History Main Topics    Smoking status: Never Smoker    Smokeless tobacco: Never Used    Alcohol use No    Drug use: No    Sexual activity: Not Currently     Other Topics Concern    Not on file     Social History Narrative    Occasional caffeine use    Sedentary lifestyle         Family History   Problem Relation Age of Onset    Diabetes Mother     Hyperlipidemia Mother     Thyroid cancer Mother     Dementia Father     Coronary artery disease Maternal Grandmother     Hypertension Maternal Grandmother     Heart attack Maternal Grandmother        DO Neema Antunez [facility] Internal Medicine PGY-2    [address], [address] [place] [address]  [phone]

## 2018-07-20 DIAGNOSIS — Z23 NEED FOR PNEUMOCOCCAL VACCINATION: ICD-10-CM

## 2018-08-13 DIAGNOSIS — R14.0 BLOATING: ICD-10-CM

## 2018-08-16 ENCOUNTER — OFFICE VISIT (OUTPATIENT)
Dept: MULTI SPECIALTY CLINIC | Facility: CLINIC | Age: 53
End: 2018-08-16
Payer: COMMERCIAL

## 2018-08-16 VITALS
HEIGHT: 69 IN | DIASTOLIC BLOOD PRESSURE: 62 MMHG | BODY MASS INDEX: 25.8 KG/M2 | SYSTOLIC BLOOD PRESSURE: 102 MMHG | WEIGHT: 174.16 LBS | TEMPERATURE: 98.5 F | HEART RATE: 84 BPM

## 2018-08-16 DIAGNOSIS — K92.1 HEMATOCHEZIA: ICD-10-CM

## 2018-08-16 DIAGNOSIS — R10.13 EPIGASTRIC ABDOMINAL PAIN: ICD-10-CM

## 2018-08-16 DIAGNOSIS — K92.1 MELENA: ICD-10-CM

## 2018-08-16 DIAGNOSIS — K21.9 GASTROESOPHAGEAL REFLUX DISEASE, ESOPHAGITIS PRESENCE NOT SPECIFIED: Primary | ICD-10-CM

## 2018-08-16 DIAGNOSIS — K59.00 CONSTIPATION, UNSPECIFIED CONSTIPATION TYPE: ICD-10-CM

## 2018-08-16 PROCEDURE — 99204 OFFICE O/P NEW MOD 45 MIN: CPT | Performed by: INTERNAL MEDICINE

## 2018-08-16 RX ORDER — POLYETHYLENE GLYCOL 3350 17 G/17G
17 POWDER, FOR SOLUTION ORAL DAILY
Qty: 30 EACH | Refills: 5 | Status: SHIPPED | OUTPATIENT
Start: 2018-08-16 | End: 2019-03-18

## 2018-08-16 NOTE — LETTER
August 16, 2018     Karinmacey WilliamDO jason  [facility] [address]    Patient: Olga Perez   YOB: 1965   Date of Visit: 8/16/2018       Dear Dr Alysha Garcia: Thank you for referring Kadiwendy Hicksbears to me for evaluation  Below are my notes for this consultation  If you have questions, please do not hesitate to call me  I look forward to following your patient along with you  Sincerely,        Facundo Waddell MD        CC: No Recipients  Constantino Schwab, MD  8/16/2018  3:49 PM  Cosign Needed Addendum  [address] [facility] - Outpatient Consultation  Olga Perez 46 y o  male MRN: [id number]  Encounter: [id number]          ASSESSMENT AND PLAN:      1  Gastroesophageal reflux disease, esophagitis presence not specified 2  Epigastric abdominal pain 3  Melena  -Pt w/ history of GERD for 5+ years-uncontrolled  -Denies NSAIDs, smoking or alcohol  -H pylori stool Ag negative  -reported history of melena, although patient vague with this  -Recommend EGD to rule out Barretts or other causes of abdominal pain, will evaluate for hiatal hernia, gastritis, esophagitis, ulcer also suggest duodenal biopsy to rule out celiac  -Given patient saw improvement on PPI therapy-advised on resumption  Based off endoscopy can re-evaluate need for continued PPI use  4  Constipation, unspecified constipation type 5  Hematochezia  -Recommend colonoscopy to evaluate for polyps or sources of bleeding such as AVMs, hemorroids, diverticula, malignancy given change in symptoms as well as change in bowel habits of alternating constipation and diarrhea  - Increase fiber  -Take miralax regularly and titrate to desired effect  Multiple symptoms w/ overlap  Would like to rule out organic causes and if workup negative will focus on symptom management for functional disease  Pt does not consume a healthy diet-fried foods, eats out, sugary beverages advised on improvement      RTC in 3 months   ______________________________________________________________________    HPI:  58-year-old male seen in consultation for GERD, epigastric pain, melena  He also reports diarrhea, constipation and bloating  The abdominal pain is located in the epigastric region-intermittent, sharp  Worse after eating, had been better controlled when on PPI  Pain does not radiate  Also complaining of heartburn  He also complains of constipation and blood in bowel movements as well as bloating  Pt currently denies black tarry stools  Denies dysphagia or odynophagia  Pt states he had a previous EGD/colonoscopy almost 8 years ago with Dr Morrison  that it was normal(do not have actual reports)  Denies NSAIDs, alcohol, or smoking  Denies family history of colon cancer  Consumes unhealthy foods-fried, fatty, sugary beverages  Pt also complaining of an abdominal "lump"-has known diastasis recti which was evaluated by general surgery-nothing to do  REVIEW OF SYSTEMS:    CONSTITUTIONAL: Denies any fever, chills, rigors, and weight loss  HEENT: No earache or tinnitus  Denies hearing loss or visual disturbances  CARDIOVASCULAR: No chest pain or palpitations  RESPIRATORY: Denies any cough, hemoptysis, shortness of breath or dyspnea on exertion  GASTROINTESTINAL: As noted in the History of Present Illness  GENITOURINARY: No problems with urination  Denies any hematuria or dysuria  NEUROLOGIC: No dizziness or vertigo, denies headaches  MUSCULOSKELETAL: Denies any muscle or joint pain  SKIN: Denies skin rashes or itching  ENDOCRINE: Denies excessive thirst  Denies intolerance to heat or cold  PSYCHOSOCIAL: Denies depression or anxiety  Denies any recent memory loss         Historical Information   Past Medical History:   Diagnosis Date    [facility] ([facility] [address] [facility]     Hyperthyroidism     Thyroid disease      Past Surgical History:   Procedure Laterality Date    CHOLECYSTECTOMY LAPAROSCOPIC      last assessed: 4/23/15    EYE SURGERY       Social History   History   Alcohol Use No     History   Drug Use No     History   Smoking Status    Never Smoker   Smokeless Tobacco    Never Used     Family History   Problem Relation Age of Onset    Diabetes Mother     Hyperlipidemia Mother     Thyroid cancer Mother     Dementia Father     Coronary artery disease Maternal Grandmother     Hypertension Maternal Grandmother     Heart attack Maternal Grandmother        Meds/Allergies       Current Outpatient Prescriptions:     acetaminophen (TYLENOL) 325 mg tablet    aspirin (ECOTRIN LOW STRENGTH) 81 mg EC tablet    brimonidine (ALPHAGAN P) 0 15 % ophthalmic solution    Cholecalciferol (VITAMIN D3) 2000 units capsule    docusate sodium (COLACE) 100 mg capsule    fluticasone (FLONASE) 50 mcg/act nasal spray    levothyroxine 125 mcg tablet    loratadine (CLARITIN) 10 mg tablet    LORazepam (ATIVAN) 1 mg tablet    lovastatin (MEVACOR) 10 MG tablet    polyethylene glycol (GLYCOLAX) powder    QUEtiapine (SEROQUEL) 100 mg tablet    simethicone (MYLICON,GAS-X) 013 MG CAPS    timolol (TIMOPTIC) 0 5 % ophthalmic solution    traZODone (DESYREL) 100 mg tablet    diclofenac sodium (VOLTAREN) 1 %    latanoprost (XALATAN) 0 005 % ophthalmic solution    pantoprazole (PROTONIX) 20 mg tablet    polyethylene glycol (MIRALAX) 17 g packet    vitamin B-12 (CYANOCOBALAMIN) 250 MCG tablet    No Known Allergies        Objective     Blood pressure 102/62, pulse 84, temperature 98 5 °F (36 9 °C), temperature source Oral, height 5' 8 5" (1 74 m), weight 79 kg (174 lb 2 6 oz)  Body mass index is 26 1 kg/m²          PHYSICAL EXAM:      General Appearance:   Alert, cooperative, no distress   HEENT:   Normocephalic, atraumatic, anicteric, +injected sclera     Neck:  Supple, symmetrical, trachea midline   Lungs:   Clear to auscultation bilaterally; no rales, rhonchi or wheezing; respirations unlabored    Heart[de-identified]   Regular rate and rhythm; no murmur, rub, or gallop  Abdomen:   Soft, non-tender, non-distended; normal bowel sounds; no masses, no organomegaly, + diastasis recti    Genitalia:   Deferred    Rectal:   Deferred    Extremities:  No cyanosis, clubbing or edema    Pulses:  2+ and symmetric    Skin:  No jaundice, rashes, or lesions    Lymph nodes:  No palpable cervical lymphadenopathy        Lab Results:   No visits with results within 1 Day(s) from this visit  Latest known visit with results is:   Appointment on 06/11/2018   Component Date Value    H pylori Ag, Stl 06/11/2018 Negative          Radiology Results:   No results found

## 2018-08-16 NOTE — PROGRESS NOTES
[facility] - Outpatient Consultation  Princess Swann 46 y o  male MRN: [id number]  Encounter: [id number]          ASSESSMENT AND PLAN:      1  Gastroesophageal reflux disease, esophagitis presence not specified 2  Epigastric abdominal pain 3  Melena  -Pt w/ history of GERD for 5+ years-uncontrolled  -Denies NSAIDs, smoking or alcohol  -H pylori stool Ag negative  -reported history of melena, although patient vague with this  -Recommend EGD to rule out Barretts or other causes of abdominal pain, will evaluate for hiatal hernia, gastritis, esophagitis, ulcer also suggest duodenal biopsy to rule out celiac  -Given patient saw improvement on PPI therapy-advised on resumption  Based off endoscopy can re-evaluate need for continued PPI use  4  Constipation, unspecified constipation type 5  Hematochezia  -Recommend colonoscopy to evaluate for polyps or sources of bleeding such as AVMs, hemorroids, diverticula, malignancy given change in symptoms as well as change in bowel habits of alternating constipation and diarrhea  - Increase fiber  -Take miralax regularly and titrate to desired effect  Multiple symptoms w/ overlap  Would like to rule out organic causes and if workup negative will focus on symptom management for functional disease  Pt does not consume a healthy diet-fried foods, eats out, sugary beverages advised on improvement  RTC in 3 months   ______________________________________________________________________    HPI:  79-year-old male seen in consultation for GERD, epigastric pain, melena  He also reports diarrhea, constipation and bloating  The abdominal pain is located in the epigastric region-intermittent, sharp  Worse after eating, had been better controlled when on PPI  Pain does not radiate  Also complaining of heartburn  He also complains of constipation and blood in bowel movements as well as bloating  Pt currently denies black tarry stools   Denies dysphagia or odynophagia  Pt states he had a previous EGD/colonoscopy almost 8 years ago with Dr Dustin Vora that it was normal(do not have actual reports)  Denies NSAIDs, alcohol, or smoking  Denies family history of colon cancer  Consumes unhealthy foods-fried, fatty, sugary beverages  Pt also complaining of an abdominal "lump"-has known diastasis recti which was evaluated by general surgery-nothing to do  REVIEW OF SYSTEMS:    CONSTITUTIONAL: Denies any fever, chills, rigors, and weight loss  HEENT: No earache or tinnitus  Denies hearing loss or visual disturbances  CARDIOVASCULAR: No chest pain or palpitations  RESPIRATORY: Denies any cough, hemoptysis, shortness of breath or dyspnea on exertion  GASTROINTESTINAL: As noted in the History of Present Illness  GENITOURINARY: No problems with urination  Denies any hematuria or dysuria  NEUROLOGIC: No dizziness or vertigo, denies headaches  MUSCULOSKELETAL: Denies any muscle or joint pain  SKIN: Denies skin rashes or itching  ENDOCRINE: Denies excessive thirst  Denies intolerance to heat or cold  PSYCHOSOCIAL: Denies depression or anxiety  Denies any recent memory loss         Historical Information   Past Medical History:   Diagnosis Date    Cardiomyopathy ([facility] [address]     Hyperthyroidism     Thyroid disease      Past Surgical History:   Procedure Laterality Date    CHOLECYSTECTOMY LAPAROSCOPIC      last assessed: 4/23/15    EYE SURGERY       Social History   History   Alcohol Use No     History   Drug Use No     History   Smoking Status    Never Smoker   Smokeless Tobacco    Never Used     Family History   Problem Relation Age of Onset    Diabetes Mother     Hyperlipidemia Mother     Thyroid cancer Mother     Dementia Father     Coronary artery disease Maternal Grandmother     Hypertension Maternal Grandmother     Heart attack Maternal Grandmother        Meds/Allergies       Current Outpatient Prescriptions:     acetaminophen (TYLENOL) 325 mg tablet    aspirin (ECOTRIN LOW STRENGTH) 81 mg EC tablet    brimonidine (ALPHAGAN P) 0 15 % ophthalmic solution    Cholecalciferol (VITAMIN D3) 2000 units capsule    docusate sodium (COLACE) 100 mg capsule    fluticasone (FLONASE) 50 mcg/act nasal spray    levothyroxine 125 mcg tablet    loratadine (CLARITIN) 10 mg tablet    LORazepam (ATIVAN) 1 mg tablet    lovastatin (MEVACOR) 10 MG tablet    polyethylene glycol (GLYCOLAX) powder    QUEtiapine (SEROQUEL) 100 mg tablet    simethicone (MYLICON,GAS-X) 797 MG CAPS    timolol (TIMOPTIC) 0 5 % ophthalmic solution    traZODone (DESYREL) 100 mg tablet    diclofenac sodium (VOLTAREN) 1 %    latanoprost (XALATAN) 0 005 % ophthalmic solution    pantoprazole (PROTONIX) 20 mg tablet    polyethylene glycol (MIRALAX) 17 g packet    vitamin B-12 (CYANOCOBALAMIN) 250 MCG tablet    No Known Allergies        Objective     Blood pressure 102/62, pulse 84, temperature 98 5 °F (36 9 °C), temperature source Oral, height 5' 8 5" (1 74 m), weight 79 kg (174 lb 2 6 oz)  Body mass index is 26 1 kg/m²  PHYSICAL EXAM:      General Appearance:   Alert, cooperative, no distress   HEENT:   Normocephalic, atraumatic, anicteric, +injected sclera     Neck:  Supple, symmetrical, trachea midline   Lungs:   Clear to auscultation bilaterally; no rales, rhonchi or wheezing; respirations unlabored    Heart[de-identified]   Regular rate and rhythm; no murmur, rub, or gallop  Abdomen:   Soft, non-tender, non-distended; normal bowel sounds; no masses, no organomegaly, + diastasis recti    Genitalia:   Deferred    Rectal:   Deferred    Extremities:  No cyanosis, clubbing or edema    Pulses:  2+ and symmetric    Skin:  No jaundice, rashes, or lesions    Lymph nodes:  No palpable cervical lymphadenopathy        Lab Results:   No visits with results within 1 Day(s) from this visit     Latest known visit with results is:   Appointment on 06/11/2018   Component Date Value    H pylori Ag, Stl 06/11/2018 Negative          Radiology Results:   No results found

## 2018-08-16 NOTE — PATIENT INSTRUCTIONS
Miralax as needed for constipation  Decrease fried foods, decrease sugary beverages  Take protonix daily 30-60 minutes before 1st meal of day

## 2018-08-16 NOTE — LETTER
August 16, 2018     Vernadine Mcburney, DO  [facility] [address]    Patient: Jannie Shaikh   YOB: 1965   Date of Visit: 8/16/2018       Dear Dr Regina Kelley: Thank you for referring Anna Montejo to me for evaluation  Below are my notes for this consultation  If you have questions, please do not hesitate to call me  I look forward to following your patient along with you  Sincerely,        Chantal Vallejo MD        CC: No Recipients  Brittanie Samano MD  8/16/2018  3:15 PM  Sign at close encounter  [facility] - Outpatient Consultation  Jannie Shaikh 46 y o  male MRN: [id number]  Encounter: [id number]          ASSESSMENT AND PLAN:      1  Gastroesophageal reflux disease, esophagitis presence not specified 2  Epigastric abdominal pain 3  Melena  -Pt w/ history of GERD for 5+ years-uncontrolled  -Denies NSAIDs, smoking or alcohol  -H pylori stool Ag negative  -reported history of melena, although patient vague with this  -Recommend EGD to rule out Barretts or other causes of abdominal pain, will evaluate for hiatal hernia, gastritis, esophagitis, ulcer also suggest duodenal biopsy to rule out celiac  -Given patient saw improvement on PPI therapy-advised on resumption  Based off endoscopy can re-evaluate need for continued PPI use  4  Constipation, unspecified constipation type 5  Hematochezia  -Recommend colonoscopy to evaluate for polyps or sources of bleeding such as AVMs, hemorroids, diverticula, malignancy  -Given abdominal pain and bleeding and diarrhea-random colon biopsy to rule out IBD  - Increase fiber  -Take miralax regularly and titrate to desired effect  Multiple symptoms w/ overlap  Would like to rule out organic causes and if workup negative will focus on symptom management for functional disease  Pt does not consume a healthy diet-fried foods, eats out, sugary beverages advised on improvement      RTC in 3 months   ______________________________________________________________________    HPI:  42-year-old male seen in consultation for GERD, epigastric pain, melena  He also reports diarrhea, constipation and bloating  The abdominal pain is located in the epigastric region-intermittent, sharp  Worse after eating, had been better controlled when on PPI  Pain does not radiate  Also complaining of heartburn  He also complains of constipation and blood in bowel movements as well as bloating  Pt currently denies black tarry stools  Denies dysphagia or odynophagia  Pt states he had a previous EGD/colonoscopy almost 8 years ago with Dr Tamika Ramos that it was normal(do not have actual reports)  Denies NSAIDs, alcohol, or smoking  Denies family history of colon cancer  Consumes unhealthy foods-fried, fatty, sugary beverages  Pt also complaining of an abdominal "lump"-has known diastasis recti which was evaluated by general surgery-nothing to do  REVIEW OF SYSTEMS:    CONSTITUTIONAL: Denies any fever, chills, rigors, and weight loss  HEENT: No earache or tinnitus  Denies hearing loss or visual disturbances  CARDIOVASCULAR: No chest pain or palpitations  RESPIRATORY: Denies any cough, hemoptysis, shortness of breath or dyspnea on exertion  GASTROINTESTINAL: As noted in the History of Present Illness  GENITOURINARY: No problems with urination  Denies any hematuria or dysuria  NEUROLOGIC: No dizziness or vertigo, denies headaches  MUSCULOSKELETAL: Denies any muscle or joint pain  SKIN: Denies skin rashes or itching  ENDOCRINE: Denies excessive thirst  Denies intolerance to heat or cold  PSYCHOSOCIAL: Denies depression or anxiety  Denies any recent memory loss         Historical Information   Past Medical History:   Diagnosis Date    Cardiomyopathy ([facility] [address] [facility]     Hyperthyroidism     Thyroid disease      Past Surgical History:   Procedure Laterality Date    CHOLECYSTECTOMY LAPAROSCOPIC      last assessed: 4/23/15    EYE SURGERY       Social History   History   Alcohol Use No     History   Drug Use No     History   Smoking Status    Never Smoker   Smokeless Tobacco    Never Used     Family History   Problem Relation Age of Onset    Diabetes Mother     Hyperlipidemia Mother     Thyroid cancer Mother     Dementia Father     Coronary artery disease Maternal Grandmother     Hypertension Maternal Grandmother     Heart attack Maternal Grandmother        Meds/Allergies       Current Outpatient Prescriptions:     acetaminophen (TYLENOL) 325 mg tablet    aspirin (ECOTRIN LOW STRENGTH) 81 mg EC tablet    brimonidine (ALPHAGAN P) 0 15 % ophthalmic solution    Cholecalciferol (VITAMIN D3) 2000 units capsule    docusate sodium (COLACE) 100 mg capsule    fluticasone (FLONASE) 50 mcg/act nasal spray    levothyroxine 125 mcg tablet    loratadine (CLARITIN) 10 mg tablet    LORazepam (ATIVAN) 1 mg tablet    lovastatin (MEVACOR) 10 MG tablet    polyethylene glycol (GLYCOLAX) powder    QUEtiapine (SEROQUEL) 100 mg tablet    simethicone (MYLICON,GAS-X) 126 MG CAPS    timolol (TIMOPTIC) 0 5 % ophthalmic solution    traZODone (DESYREL) 100 mg tablet    diclofenac sodium (VOLTAREN) 1 %    latanoprost (XALATAN) 0 005 % ophthalmic solution    pantoprazole (PROTONIX) 20 mg tablet    polyethylene glycol (MIRALAX) 17 g packet    vitamin B-12 (CYANOCOBALAMIN) 250 MCG tablet    No Known Allergies        Objective     Blood pressure 102/62, pulse 84, temperature 98 5 °F (36 9 °C), temperature source Oral, height 5' 8 5" (1 74 m), weight 79 kg (174 lb 2 6 oz)  Body mass index is 26 1 kg/m²          PHYSICAL EXAM:      General Appearance:   Alert, cooperative, no distress   HEENT:   Normocephalic, atraumatic, anicteric, +injected sclera     Neck:  Supple, symmetrical, trachea midline   Lungs:   Clear to auscultation bilaterally; no rales, rhonchi or wheezing; respirations unlabored    Heart[de-identified]   Regular rate and rhythm; no murmur, rub, or gallop  Abdomen:   Soft, non-tender, non-distended; normal bowel sounds; no masses, no organomegaly, + diastasis recti    Genitalia:   Deferred    Rectal:   Deferred    Extremities:  No cyanosis, clubbing or edema    Pulses:  2+ and symmetric    Skin:  No jaundice, rashes, or lesions    Lymph nodes:  No palpable cervical lymphadenopathy        Lab Results:   No visits with results within 1 Day(s) from this visit  Latest known visit with results is:   Appointment on 06/11/2018   Component Date Value    H pylori Ag, Stl 06/11/2018 Negative          Radiology Results:   No results found

## 2018-08-16 NOTE — LETTER
August 16, 2018     Sd Espana DO  [address] [facility] [address]    Patient: Socorro Douglass   YOB: 1965   Date of Visit: 8/16/2018       Dear Dr Richard Posey: Thank you for referring Samantha Perez to me for evaluation  Below are my notes for this consultation  If you have questions, please do not hesitate to call me  I look forward to following your patient along with you  Sincerely,        Florentin Guevara MD        CC: No Recipients  Kush Espinoza MD  8/16/2018  3:14 PM  Sign at close encounter  [facility] - Outpatient Consultation  Socorro Douglass 46 y o  male MRN: [id number]  Encounter: [id number]          ASSESSMENT AND PLAN:      1  Gastroesophageal reflux disease, esophagitis presence not specified 2  Epigastric abdominal pain 3  Melena  -Pt w/ history of GERD for 5+ years-uncontrolled  -Denies NSAIDs, smoking or alcohol  -H pylori stool Ag negative  -reported history of melena, although patient vague with this  -Recommend EGD to rule out Barretts or other causes of abdominal pain, will evaluate for hiatal hernia, gastritis, esophagitis, ulcer also suggest duodenal biopsy to rule out celiac  -Given patient saw improvement on PPI therapy-advised on resumption  Based off endoscopy can re-evaluate need for continued PPI use  4  Constipation, unspecified constipation type 5  Hematochezia  -Recommend colonoscopy to evaluate for polyps or sources of bleeding such as AVMs, hemorroids, diverticula, malignancy  -Given abdominal pain and bleeding and diarrhea-random colon biopsy to rule out IBD  - Increase fiber  -Take miralax regularly and titrate to desired effect  Multiple symptoms w/ overlap  Would like to rule out organic causes and if workup negative will focus on symptom management for functional disease  Pt does not consume a healthy diet-fried foods, eats out, sugary beverages advised on improvement      RTC in 3 months   ______________________________________________________________________    HPI:  69-year-old male seen in consultation for GERD, epigastric pain, melena  He also reports diarrhea, constipation and bloating  The abdominal pain is located in the epigastric region-intermittent, sharp  Worse after eating, had been better controlled when on PPI  Pain does not radiate  Also complaining of heartburn  He also complains of constipation and blood in bowel movements as well as bloating  Pt currently denies black tarry stools  Pt states he had a previous EGD/colonoscopy almost 8 years ago with Dr Monroe Camera that it was normal(do not have actual reports)  Denies NSAIDs, alcohol, or smoking  Denies family history of colon cancer  Consumes unhealthy foods-fried, fatty, sugary beverages  Pt also complaining of an abdominal "lump"-has known diastasis recti which was evaluated by general surgery-nothing to do  REVIEW OF SYSTEMS:    CONSTITUTIONAL: Denies any fever, chills, rigors, and weight loss  HEENT: No earache or tinnitus  Denies hearing loss or visual disturbances  CARDIOVASCULAR: No chest pain or palpitations  RESPIRATORY: Denies any cough, hemoptysis, shortness of breath or dyspnea on exertion  GASTROINTESTINAL: As noted in the History of Present Illness  GENITOURINARY: No problems with urination  Denies any hematuria or dysuria  NEUROLOGIC: No dizziness or vertigo, denies headaches  MUSCULOSKELETAL: Denies any muscle or joint pain  SKIN: Denies skin rashes or itching  ENDOCRINE: Denies excessive thirst  Denies intolerance to heat or cold  PSYCHOSOCIAL: Denies depression or anxiety  Denies any recent memory loss         Historical Information   Past Medical History:   Diagnosis Date    Cardiomyopathy ([facility] [address] )     Hyperthyroidism     Thyroid disease      Past Surgical History:   Procedure Laterality Date    CHOLECYSTECTOMY LAPAROSCOPIC      last assessed: 4/23/15   [facility] Social History   History   Alcohol Use No     History   Drug Use No     History   Smoking Status    Never Smoker   Smokeless Tobacco    Never Used     Family History   Problem Relation Age of Onset    Diabetes Mother     Hyperlipidemia Mother     Thyroid cancer Mother     Dementia Father     Coronary artery disease Maternal Grandmother     Hypertension Maternal Grandmother     Heart attack Maternal Grandmother        Meds/Allergies       Current Outpatient Prescriptions:     acetaminophen (TYLENOL) 325 mg tablet    aspirin (ECOTRIN LOW STRENGTH) 81 mg EC tablet    brimonidine (ALPHAGAN P) 0 15 % ophthalmic solution    Cholecalciferol (VITAMIN D3) 2000 units capsule    docusate sodium (COLACE) 100 mg capsule    fluticasone (FLONASE) 50 mcg/act nasal spray    levothyroxine 125 mcg tablet    loratadine (CLARITIN) 10 mg tablet    LORazepam (ATIVAN) 1 mg tablet    lovastatin (MEVACOR) 10 MG tablet    polyethylene glycol (GLYCOLAX) powder    QUEtiapine (SEROQUEL) 100 mg tablet    simethicone (MYLICON,GAS-X) 172 MG CAPS    timolol (TIMOPTIC) 0 5 % ophthalmic solution    traZODone (DESYREL) 100 mg tablet    diclofenac sodium (VOLTAREN) 1 %    latanoprost (XALATAN) 0 005 % ophthalmic solution    pantoprazole (PROTONIX) 20 mg tablet    polyethylene glycol (MIRALAX) 17 g packet    vitamin B-12 (CYANOCOBALAMIN) 250 MCG tablet    No Known Allergies        Objective     Blood pressure 102/62, pulse 84, temperature 98 5 °F (36 9 °C), temperature source Oral, height 5' 8 5" (1 74 m), weight 79 kg (174 lb 2 6 oz)  Body mass index is 26 1 kg/m²  PHYSICAL EXAM:      General Appearance:   Alert, cooperative, no distress   HEENT:   Normocephalic, atraumatic, anicteric, +injected sclera     Neck:  Supple, symmetrical, trachea midline   Lungs:   Clear to auscultation bilaterally; no rales, rhonchi or wheezing; respirations unlabored    Heart[de-identified]   Regular rate and rhythm; no murmur, rub, or gallop  Abdomen:   Soft, non-tender, non-distended; normal bowel sounds; no masses, no organomegaly, + diastasis recti    Genitalia:   Deferred    Rectal:   Deferred    Extremities:  No cyanosis, clubbing or edema    Pulses:  2+ and symmetric    Skin:  No jaundice, rashes, or lesions    Lymph nodes:  No palpable cervical lymphadenopathy        Lab Results:   No visits with results within 1 Day(s) from this visit  Latest known visit with results is:   Appointment on 06/11/2018   Component Date Value    H pylori Ag, Stl 06/11/2018 Negative          Radiology Results:   No results found

## 2018-08-18 RX ORDER — SIMETHICONE 125 MG
125 CAPSULE ORAL EVERY 8 HOURS PRN
Qty: 90 CAPSULE | Refills: 3 | Status: SHIPPED | OUTPATIENT
Start: 2018-08-18 | End: 2018-12-20 | Stop reason: SDUPTHER

## 2018-08-28 ENCOUNTER — TELEPHONE (OUTPATIENT)
Dept: INTERNAL MEDICINE CLINIC | Facility: CLINIC | Age: 53
End: 2018-08-28

## 2018-08-28 NOTE — TELEPHONE ENCOUNTER
Please review (INDEPENDENT ENROLLMENT ) form placed in the (GREEN) team folder in the provider flow station  ([id number]) when form is complete  If the form requires a signature by a MD or DO, please review it with them and sign the form  Please place the form in the (GREEN) team folder in the Clinical flow station at the [address] [facility] and reply to this task to the Clinical Pool

## 2018-08-29 NOTE — TELEPHONE ENCOUNTER
Form completed by Dr Sofia Watkins) and reviewed by medical assistant/nurse  Form placed in (GREEN) folder in Clerical flow station

## 2018-09-05 DIAGNOSIS — R05.9 COUGH: ICD-10-CM

## 2018-09-13 RX ORDER — LORATADINE 10 MG/1
10 TABLET ORAL DAILY
Qty: 30 TABLET | Refills: 3 | Status: SHIPPED | OUTPATIENT
Start: 2018-09-13 | End: 2019-03-21 | Stop reason: SDUPTHER

## 2018-09-14 ENCOUNTER — OFFICE VISIT (OUTPATIENT)
Dept: INTERNAL MEDICINE CLINIC | Facility: CLINIC | Age: 53
End: 2018-09-14
Payer: COMMERCIAL

## 2018-09-14 VITALS
WEIGHT: 178.13 LBS | HEART RATE: 80 BPM | DIASTOLIC BLOOD PRESSURE: 72 MMHG | HEIGHT: 69 IN | TEMPERATURE: 98.1 F | BODY MASS INDEX: 26.38 KG/M2 | SYSTOLIC BLOOD PRESSURE: 112 MMHG

## 2018-09-14 DIAGNOSIS — M25.562 PAIN IN BOTH KNEES, UNSPECIFIED CHRONICITY: ICD-10-CM

## 2018-09-14 DIAGNOSIS — E03.9 HYPOTHYROIDISM, UNSPECIFIED TYPE: ICD-10-CM

## 2018-09-14 DIAGNOSIS — K59.00 CONSTIPATION, UNSPECIFIED CONSTIPATION TYPE: ICD-10-CM

## 2018-09-14 DIAGNOSIS — E53.8 LOW VITAMIN B12 LEVEL: ICD-10-CM

## 2018-09-14 DIAGNOSIS — M25.561 PAIN IN BOTH KNEES, UNSPECIFIED CHRONICITY: ICD-10-CM

## 2018-09-14 DIAGNOSIS — E78.1 HYPERTRIGLYCERIDEMIA: ICD-10-CM

## 2018-09-14 DIAGNOSIS — Z23 NEED FOR PROPHYLACTIC VACCINATION AND INOCULATION AGAINST INFLUENZA: ICD-10-CM

## 2018-09-14 DIAGNOSIS — M62.08 DIASTASIS RECTI: ICD-10-CM

## 2018-09-14 DIAGNOSIS — K21.9 GASTROESOPHAGEAL REFLUX DISEASE, ESOPHAGITIS PRESENCE NOT SPECIFIED: Primary | ICD-10-CM

## 2018-09-14 PROCEDURE — 90471 IMMUNIZATION ADMIN: CPT | Performed by: HOSPITALIST

## 2018-09-14 PROCEDURE — 90682 RIV4 VACC RECOMBINANT DNA IM: CPT | Performed by: HOSPITALIST

## 2018-09-14 PROCEDURE — 99213 OFFICE O/P EST LOW 20 MIN: CPT | Performed by: HOSPITALIST

## 2018-09-14 PROCEDURE — 1036F TOBACCO NON-USER: CPT | Performed by: HOSPITALIST

## 2018-09-14 PROCEDURE — 3008F BODY MASS INDEX DOCD: CPT | Performed by: HOSPITALIST

## 2018-09-14 RX ORDER — GEMFIBROZIL 600 MG/1
600 TABLET, FILM COATED ORAL
Qty: 60 TABLET | Refills: 2 | Status: SHIPPED | OUTPATIENT
Start: 2018-09-14 | End: 2018-12-20 | Stop reason: SDUPTHER

## 2018-09-14 RX ORDER — LOVASTATIN 10 MG/1
10 TABLET ORAL DAILY
Qty: 30 TABLET | Refills: 2 | Status: SHIPPED | OUTPATIENT
Start: 2018-09-14 | End: 2018-09-14 | Stop reason: ALTCHOICE

## 2018-09-14 NOTE — PROGRESS NOTES
CLINIC VISIT NOTE  Bravo Singh   46 y o  male   MRN: [id number]    ASSESSMENT/PLAN:  Diagnoses and all orders for this visit:    1  Gastroesophageal reflux disease, esophagitis presence not specified, epigastric abdominal pain  · Improved  · H pylori stool antigen testing previously negative  · Denies any further melena (does reportedly take OTC iron supplement which may contribute to dark discoloration of stools, which patient does endorse)  · Denies any hematochezia/BRBPR  · Evaluated by GI and planned for EGD and colonoscopy; instructed patient to call office to schedule  · Continue Protonix daily    2  Diastasis recti  · Stable  · No hernia on imaging  · Evaluated by surgery and surgical intervention was not indicated  · Continue with abdominal binder and weight loss efforts    3  Constipation  · Improved  · Continue high fiber diet  · Continue Miralax daily and titrate dose to desired effect  · Schedule colonoscopy with GI    4  Hypothyroidism, unspecified type  · On levothyroxine 125 mcg daily  · Will check updated TSH and FT4    5  Need for influenza vaccination  · Administered today    6  Low vitamin B12 level  · Folate level normal at last check  · Currently on 250 mcg daily  · Will check MMA level    7  Hypertriglyceridemia  · Triglyceride 349 on last lipid panel  · Remainder of lipids WNL  · STOP lovastatin  · Start gemfibrozil 600 mg tab, 1 tab twice daily with meals  · Recheck lipid panel    Health Maintenance:  Flu vaccine today  Due for colonoscopy - will call GI to schedule as he needs EGD as well  UTD with pneumonia vaccine and Tdap    Schedule a follow-up appointment in 6 months  Chief Complaint: Follow-up of chronic conditions    History of Present Illness:  46year old male presents to office for 3 month follow-up  Translation provided by certified staff member as patient is primarily [place] speaking  Reports doing well  States abdominal pain has improved   Denies any current melena or hematochezia  Does states stools are dark in appearance but states he takes OTC iron supplement  Compliant with medications  Saw GI in August for evaluation and recommended EGD and colonoscopy  Patient has yet to schedule  Offers no other acute complaints or concerns at this time  Review of Systems   Gastrointestinal: Positive for abdominal distention, abdominal pain (Epigastric, improved) and constipation  All other systems reviewed and are negative  Objective:  Vitals:    09/14/18 1023   BP: 112/72   Pulse: 80   Temp: 98 1 °F (36 7 °C)   Weight: 80 8 kg (178 lb 2 1 oz)   Height: 5' 8 5" (1 74 m)     Physical Exam   Constitutional: He is oriented to person, place, and time  He appears well-developed  No distress  HENT:   Head: Normocephalic and atraumatic  Nose: Nose normal    Mouth/Throat: Oropharynx is clear and moist  No oropharyngeal exudate  Eyes: Conjunctivae and EOM are normal  No scleral icterus  Neck: Neck supple  No JVD present  No tracheal deviation present  Cardiovascular: Normal rate, regular rhythm, normal heart sounds and intact distal pulses  Exam reveals no gallop and no friction rub  No murmur heard  Pulmonary/Chest: Effort normal and breath sounds normal  No respiratory distress  He has no wheezes  He has no rales  He exhibits no tenderness  Abdominal: Soft  Bowel sounds are normal  He exhibits no distension  There is no tenderness  There is no rebound and no guarding  Protuberant abdomen, diastasis recti without herniation   Musculoskeletal: He exhibits no edema, tenderness or deformity  Neurological: He is alert and oriented to person, place, and time  No cranial nerve deficit  Skin: Skin is warm and dry  No rash noted  He is not diaphoretic  No erythema  No pallor  Psychiatric: He has a normal mood and affect  His behavior is normal  Judgment and thought content normal    Nursing note and vitals reviewed        Current Outpatient Prescriptions:    acetaminophen (TYLENOL) 325 mg tablet, Take 650 mg by mouth every 6 (six) hours as needed for mild pain, Disp: , Rfl:     aspirin (ECOTRIN LOW STRENGTH) 81 mg EC tablet, Take 81 mg by mouth daily, Disp: , Rfl:     brimonidine (ALPHAGAN P) 0 15 % ophthalmic solution, Apply 1 drop to eye 2 (two) times a day, Disp: , Rfl:     Cholecalciferol (VITAMIN D3) 2000 units capsule, Take 1 tablet by mouth daily, Disp: , Rfl:     diclofenac sodium (VOLTAREN) 1 %, Apply 2 g topically 4 (four) times a day, Disp: 1 Tube, Rfl: 0    docusate sodium (COLACE) 100 mg capsule, Take 1 capsule by mouth 2 (two) times a day, Disp: 30 capsule, Rfl: 0    fluticasone (FLONASE) 50 mcg/act nasal spray, 1 spray into each nostril daily, Disp: , Rfl:     latanoprost (XALATAN) 0 005 % ophthalmic solution, Apply 1 drop to eye daily, Disp: , Rfl:     levothyroxine 125 mcg tablet, Take 137 mcg by mouth daily  , Disp: , Rfl:     LORazepam (ATIVAN) 1 mg tablet, Take 1 mg by mouth daily, Disp: , Rfl:     pantoprazole (PROTONIX) 20 mg tablet, Take 1 tablet (20 mg total) by mouth daily, Disp: 30 tablet, Rfl: 6    polyethylene glycol (MIRALAX) 17 g packet, Take 17 g by mouth daily, Disp: 30 each, Rfl: 5    QUEtiapine (SEROQUEL) 100 mg tablet, Take 100 mg by mouth daily at bedtime  , Disp: , Rfl:     simethicone (MYLICON,GAS-X) 707 MG CAPS, Take 1 capsule (125 mg total) by mouth every 8 (eight) hours as needed for flatulence (stomach gas), Disp: 90 capsule, Rfl: 3    timolol (TIMOPTIC) 0 5 % ophthalmic solution, Apply 1 drop to eye every morning, Disp: , Rfl:     traZODone (DESYREL) 100 mg tablet, Take 100 mg by mouth daily at bedtime  , Disp: , Rfl:     vitamin B-12 (CYANOCOBALAMIN) 250 MCG tablet, Take 1 tablet (250 mcg total) by mouth daily, Disp: 90 tablet, Rfl: 3    loratadine (CLARITIN) 10 mg tablet, Take 1 tablet (10 mg total) by mouth daily (Patient not taking: Reported on 9/14/2018 ), Disp: 30 tablet, Rfl: 3    lovastatin (MEVACOR) 10 MG tablet, Take 1 tablet by mouth daily, Disp: , Rfl:     polyethylene glycol (GLYCOLAX) powder, Take 17 g by mouth daily  , Disp: 119 g, Rfl: 0    Past Medical History:   Diagnosis Date    Cardiomyopathy ([address] )     Hyperthyroidism     Thyroid disease      Past Surgical History:   Procedure Laterality Date    CHOLECYSTECTOMY LAPAROSCOPIC      last assessed: 4/23/15    EYE SURGERY       Social History     Social History    Marital status: Single     Spouse name: N/A    Number of children: N/A    Years of education: N/A     Occupational History    Not on file  Social History Main Topics    Smoking status: Never Smoker    Smokeless tobacco: Never Used    Alcohol use No    Drug use: No    Sexual activity: Not Currently     Other Topics Concern    Not on file     Social History Narrative    Occasional caffeine use    Sedentary lifestyle         Family History   Problem Relation Age of Onset    Diabetes Mother     Hyperlipidemia Mother     Thyroid cancer Mother     Dementia Father     Coronary artery disease Maternal Grandmother     Hypertension Maternal Grandmother     Heart attack Maternal Grandmother      ==  Merlinda Lao, DO [address] Internal Medicine PGY-2    [facility]  [address]   [facility] , [address] [address] [facility], [address] [facility]  Office: [phone]  Fax: [phone]

## 2018-09-14 NOTE — PATIENT INSTRUCTIONS
1  STOP taking lovastatin  2  Start taking Gemfibrozil (1 tablet two times daily with meals)    Hiperlipidemia   LO QUE NECESITA SABER:   ¿Qué es la hiperlipidemia? La hiperlipidemia son los [address] de lípidos (Adria Mattson) en sanz mariusz  Estos lípidos incluyen al colesterol o triglicéridos  Los lípidos son producidos por sanz cuerpo  También provienen de los [place] usted consume  Sanz cuerpo necesita lípidos para funcionar correctamente, hemanth los niveles altos aumentan sanz riesgo de enfermedad cardíaca, ataque al corazón y de un derrame cerebral   ¿Qué aumenta mi riesgo para hiperlipidemia? · Antecedentes familiares de niveles altos de lípidos    · Maya dieta chaparrita en grasas saturadas, colesterol o calorías     · Consumo elevado de alcohol o fumar    · Falta de actividad física regular    · Condiciones médicas rebecca el hipotiroidismo, obesidad o diabetes tipo 2    · Ciertos medicamentos, rebecca los de la presión arterial, hormonas y esteroides  ¿Cómo se controla y trata la hiperlipidemia? Sanz médico podría recomendarle que usted realice cambios en sanz estilo de kari para ayudarlo a disminuir los niveles de los lípidos  Es posible que usted también necesite moise medicamentos para disminuir henrry niveles de lípidos  Algunos de los cambios en sanz estilo de kari que podrían ser necesarios incluyen los siguientes:  · Mantenga un peso saludable  Consulte con sanz médico cuánto debería pesar  Solicite que lo ayude a crear un plan para bajar de peso si tiene sobrepeso  La pérdida de peso puede disminuir henrry niveles de colesterol y triglicéridos  · Ejercítese según indicaciones  El ejercicio reduce los niveles de colesterol y lo ayuda a mantener un peso saludable  Ped[place] 40 minutos o más de ejercicio [facility] 3 y 4 días cada semana  Puede dividir el ejercicio en cuatro entrenamientos de 10 minutos en vez de 40 minutos a la vez   Los ejemplos de ejercicio moderado incluyen caminar enérgicamente, nadar o montar en bicicleta  Trabaje con sanz médico para planificar el mejor programa de ejercicios para usted  · No fume  La nicotina y otros químicos de los cigarrillos y cigarros pueden aumentar el riesgo de ataque cardíaco y accidente cerebrovascular  Pida información a sanz médico si usted actualmente fuma y necesita ayuda para dejar de fumar  Los cigarrillos electrónicos o tabaco sin humo todavía contienen nicotina  Consulte con sanz médico antes de [employer]  · Consuma alimentos saludables para el corazón  Hable con sanz dietista acerca de cara dieta saludable para el corazón  Lo siguiente le ayudará a controlar sanz hiperlipidemia:     ¨ Reduzca la cantidad total de grasa que usted consume  Elija meena magras, leche descremada o con 1% de [place] y productos lácteos bajos en grasa, rebecca yogur y [place]  Limite o evite el consumo de carne Mccurdy Palauan  La carne duncan es chaparrita en grasas y colesterol  [address] [facility] grasas no saludables por grasa saludables  Las grasa que no son saludables incluyen a las grasas saturadas, grasas transgénicas y el colesterol  Elija margarinas suaves que clifton bajas en grasas saturadas y que tengan poca o nada de grasas transgénicas  Las grasas monoinsaturadas son grasas saludables  Estas se encuentran en el aceite de thapa, el aceite de canola, el aguacate y los danette secos  Las grasas poliinsaturadas también son saludables  Estas se encuentran en el pescado, la linaza, las nueces y la soya  ¨ Consuma frutas y [employer]  Estas son bajas en calorías y Charm Sicard y son Beth buena daniela de vitaminas esenciales  [address] verduras de [employer] oscuro, lópez y anaranjado  Los ejemplos incluyen espinaca, col rizada, brócoli y zanahorias  ¨ [place] alimentos ricos en fibras  Elija alimentos de granos enteros y [employer]  Las buenas cheek [place] panes integrales o los cereales, los frijoles, chícharos, frutas y verduras      · Pregunte a sanz médico si usted puede moise alcohol  El alcohol puede aumentar stack presión arterial y los niveles de triglicéridos  Un trago equivale a 12 onzas de cerveza, 5 onzas de vino o 1 onza y ½ de licor  Llame al 911 en danae de presentar lo siguiente:   · Usted tiene alguno de los siguientes signos de un ataque cardíaco:      ¨ Estornudos, presión, o dolor en stack pecho que dura mas de 5 minutos o regresa  ¨ Malestar o dolor en stack espalda, david, mandíbula, abdomen, o brazo     ¨ Dificultad para respirar    ¨ Náuseas o vómito    ¨ Siente un desvanecimiento o tiene sudores fríos especialmente en el pecho o dificultad para respirar  · Usted tiene alguno de los siguientes signos de derrame cerebral:      ¨ Adormecimiento o caída de un lado de stack porsche     ¨ Debilidad en un brazo o cara pierna    ¨ Confusión o debilidad para hablar    ¨ Mareos o dolor de luis m intenso, o pérdida de la visión  ¿Cuándo xiao comunicarme con mi médico?   · Usted tiene preguntas o inquietudes acerca de stack condición o cuidado  ACUERDOS SOBRE STACK CUIDADO:   Usted tiene el derecho de ayudar a planear stack cuidado  Aprenda todo lo que pueda sobre stack condición y rebecca darle tratamiento  Discuta henrry opciones de tratamiento con henrry médicos para decidir el cuidado que usted desea recibir  Usted siempre tiene el derecho de rechazar el tratamiento  Esta información es sólo para uso en educación  Stack intención no es darle un consejo médico sobre enfermedades o tratamientos  Colsulte con stack Dwight Ahle farmacéutico antes de seguir cualquier régimen médico para saber si es seguro y efectivo para usted  © 2017 [address] Information is for End User's use only and may not be sold, redistributed or otherwise used for commercial purposes  All illustrations and images included in CareNotes® are the copyrighted property of A D A M , Inc  or Kory Ny

## 2018-10-24 DIAGNOSIS — K21.9 GASTROESOPHAGEAL REFLUX DISEASE, ESOPHAGITIS PRESENCE NOT SPECIFIED: ICD-10-CM

## 2018-10-25 RX ORDER — PANTOPRAZOLE SODIUM 20 MG/1
20 TABLET, DELAYED RELEASE ORAL DAILY
Qty: 30 TABLET | Refills: 1 | Status: SHIPPED | OUTPATIENT
Start: 2018-10-25 | End: 2019-01-03 | Stop reason: SDUPTHER

## 2018-10-31 DIAGNOSIS — E78.1 HYPERTRIGLYCERIDEMIA: ICD-10-CM

## 2018-11-01 RX ORDER — LOVASTATIN 10 MG/1
10 TABLET ORAL DAILY
Qty: 30 TABLET | OUTPATIENT
Start: 2018-11-01

## 2018-12-14 ENCOUNTER — APPOINTMENT (OUTPATIENT)
Dept: LAB | Facility: CLINIC | Age: 53
End: 2018-12-14
Payer: COMMERCIAL

## 2018-12-14 DIAGNOSIS — E78.1 HYPERTRIGLYCERIDEMIA: ICD-10-CM

## 2018-12-14 DIAGNOSIS — E03.9 HYPOTHYROIDISM, UNSPECIFIED TYPE: ICD-10-CM

## 2018-12-14 DIAGNOSIS — E53.8 LOW VITAMIN B12 LEVEL: ICD-10-CM

## 2018-12-14 LAB
CHOLEST SERPL-MCNC: 192 MG/DL (ref 50–200)
HDLC SERPL-MCNC: 46 MG/DL (ref 40–60)
LDLC SERPL CALC-MCNC: 106 MG/DL (ref 0–100)
TRIGL SERPL-MCNC: 202 MG/DL
TSH SERPL DL<=0.05 MIU/L-ACNC: 1.46 UIU/ML (ref 0.36–3.74)

## 2018-12-14 PROCEDURE — 84443 ASSAY THYROID STIM HORMONE: CPT

## 2018-12-14 PROCEDURE — 36415 COLL VENOUS BLD VENIPUNCTURE: CPT

## 2018-12-14 PROCEDURE — 80061 LIPID PANEL: CPT

## 2018-12-14 PROCEDURE — 83918 ORGANIC ACIDS TOTAL QUANT: CPT

## 2018-12-20 ENCOUNTER — TELEPHONE (OUTPATIENT)
Dept: INTERNAL MEDICINE CLINIC | Facility: CLINIC | Age: 53
End: 2018-12-20

## 2018-12-20 DIAGNOSIS — R14.0 BLOATING: ICD-10-CM

## 2018-12-20 DIAGNOSIS — E78.1 HYPERTRIGLYCERIDEMIA: ICD-10-CM

## 2018-12-20 DIAGNOSIS — K59.00 CONSTIPATION: Primary | ICD-10-CM

## 2018-12-20 DIAGNOSIS — J30.9 ALLERGIC RHINITIS: Primary | ICD-10-CM

## 2018-12-20 DIAGNOSIS — E03.9 HYPOTHYROIDISM: Primary | ICD-10-CM

## 2018-12-20 RX ORDER — GEMFIBROZIL 600 MG/1
600 TABLET, FILM COATED ORAL
Qty: 60 TABLET | Refills: 2 | Status: SHIPPED | OUTPATIENT
Start: 2018-12-20 | End: 2019-02-04 | Stop reason: SDUPTHER

## 2018-12-20 RX ORDER — POLYETHYLENE GLYCOL 3350 17 G/17G
17 POWDER, FOR SOLUTION ORAL DAILY
Qty: 119 G | Refills: 2 | Status: SHIPPED | OUTPATIENT
Start: 2018-12-20 | End: 2019-03-21 | Stop reason: SDUPTHER

## 2018-12-20 RX ORDER — SIMETHICONE 125 MG
125 CAPSULE ORAL EVERY 8 HOURS PRN
Qty: 90 CAPSULE | Refills: 0 | Status: SHIPPED | OUTPATIENT
Start: 2018-12-20 | End: 2019-01-23 | Stop reason: SDUPTHER

## 2018-12-20 RX ORDER — LEVOTHYROXINE SODIUM 0.12 MG/1
137 TABLET ORAL DAILY
Qty: 90 TABLET | Refills: 1 | Status: SHIPPED | OUTPATIENT
Start: 2018-12-20 | End: 2019-03-21 | Stop reason: SDUPTHER

## 2018-12-20 RX ORDER — FLUTICASONE PROPIONATE 50 MCG
1 SPRAY, SUSPENSION (ML) NASAL DAILY
Qty: 1 BOTTLE | Refills: 1 | Status: SHIPPED | OUTPATIENT
Start: 2018-12-20 | End: 2019-01-12 | Stop reason: SDUPTHER

## 2018-12-23 LAB
METHYLMALONATE SERPL-SCNC: 237 NMOL/L (ref 0–378)
SL AMB DISCLAIMER: NORMAL

## 2018-12-28 DIAGNOSIS — E78.1 HYPERTRIGLYCERIDEMIA: ICD-10-CM

## 2018-12-31 RX ORDER — GEMFIBROZIL 600 MG/1
600 TABLET, FILM COATED ORAL
Qty: 60 TABLET | Refills: 2 | Status: SHIPPED | OUTPATIENT
Start: 2018-12-31 | End: 2019-03-18

## 2018-12-31 RX ORDER — LOVASTATIN 10 MG/1
10 TABLET ORAL DAILY
Qty: 30 TABLET | OUTPATIENT
Start: 2018-12-31

## 2019-01-03 DIAGNOSIS — K21.9 GASTROESOPHAGEAL REFLUX DISEASE, ESOPHAGITIS PRESENCE NOT SPECIFIED: ICD-10-CM

## 2019-01-04 RX ORDER — PANTOPRAZOLE SODIUM 20 MG/1
20 TABLET, DELAYED RELEASE ORAL DAILY
Qty: 30 TABLET | Refills: 0 | Status: SHIPPED | OUTPATIENT
Start: 2019-01-04 | End: 2019-01-23

## 2019-01-12 DIAGNOSIS — J30.9 ALLERGIC RHINITIS: ICD-10-CM

## 2019-01-14 RX ORDER — FLUTICASONE PROPIONATE 50 MCG
1 SPRAY, SUSPENSION (ML) NASAL DAILY
Qty: 1 BOTTLE | Refills: 1 | Status: SHIPPED | OUTPATIENT
Start: 2019-01-14 | End: 2019-02-27 | Stop reason: SDUPTHER

## 2019-01-23 ENCOUNTER — OFFICE VISIT (OUTPATIENT)
Dept: GASTROENTEROLOGY | Facility: CLINIC | Age: 54
End: 2019-01-23
Payer: COMMERCIAL

## 2019-01-23 VITALS
DIASTOLIC BLOOD PRESSURE: 82 MMHG | WEIGHT: 177 LBS | HEART RATE: 97 BPM | SYSTOLIC BLOOD PRESSURE: 117 MMHG | BODY MASS INDEX: 26.22 KG/M2 | TEMPERATURE: 97.3 F | HEIGHT: 69 IN

## 2019-01-23 DIAGNOSIS — R14.0 BLOATING: Primary | ICD-10-CM

## 2019-01-23 DIAGNOSIS — K59.00 CONSTIPATION, UNSPECIFIED CONSTIPATION TYPE: ICD-10-CM

## 2019-01-23 DIAGNOSIS — K21.9 GASTROESOPHAGEAL REFLUX DISEASE, ESOPHAGITIS PRESENCE NOT SPECIFIED: ICD-10-CM

## 2019-01-23 PROBLEM — K92.1 HEMATOCHEZIA: Status: ACTIVE | Noted: 2019-01-23

## 2019-01-23 PROBLEM — K92.1 MELENA: Status: ACTIVE | Noted: 2019-01-23

## 2019-01-23 PROCEDURE — 99214 OFFICE O/P EST MOD 30 MIN: CPT | Performed by: INTERNAL MEDICINE

## 2019-01-23 RX ORDER — SIMETHICONE 125 MG
125 CAPSULE ORAL EVERY 8 HOURS PRN
Qty: 90 CAPSULE | Refills: 5 | Status: SHIPPED | OUTPATIENT
Start: 2019-01-23 | End: 2019-03-21 | Stop reason: SDUPTHER

## 2019-01-23 RX ORDER — OMEPRAZOLE 40 MG/1
40 CAPSULE, DELAYED RELEASE ORAL DAILY
Qty: 90 CAPSULE | Refills: 4 | Status: SHIPPED | OUTPATIENT
Start: 2019-01-23 | End: 2019-03-21 | Stop reason: SDUPTHER

## 2019-01-23 NOTE — PATIENT INSTRUCTIONS
Colon/EGD scheduled 4/4/19 at [address] with Dalia  Miralax/Dulcolax instructions given in office

## 2019-01-23 NOTE — PROGRESS NOTES
[facility] - Outpatient Follow-up Note  Marco Antonio Sams 46 y o  male MRN: [id number]  Encounter: [id number]          ASSESSMENT AND PLAN:      1  Gastroesophageal reflux disease, esophagitis presence not specified  2  Bloating  -symptoms appear to be stable however given persistence and length of GERD symptoms will proceed with previously advised EGD to rule out H pylori, Wolf's esophagus, esophagitis and determine the appropriate type of acid suppressive medication  -patient denies melena and no overt alarm symptoms  -given patient is taking pantoprazole 20 mg twice daily although only prescribed once daily and still with incomplete resolution of symptoms will change PPI to omeprazole 40 mg once daily  -continue with simethicone as needed    3  Constipation, unspecified constipation type  -appears to be improved since last visit without any bleeding  -continue with MiraLax as needed and high-fiber diet  -will plan on previously advised colonoscopy due to history of change in bowel habits to rule polyps, malignancy    RTC in 6 mos  ______________________________________________________________________    SUBJECTIVE:  Patient's symptoms have improved since patient seen in follow-up for GERD, bloating, dyspepsia and constipation as well as melena  He was supposed to be scheduled for an endoscopy and colonoscopy however for what ever reason this was never done  In the interim patient states his symptoms have significantly improved  His constipation has improved with use of MiraLax as needed and no longer reports any blood or melena  With regards to his GERD/reflux symptoms he continues taking pantoprazole 20 mg twice daily with some relief and takes simethicone for bloating symptoms  REVIEW OF SYSTEMS IS OTHERWISE NEGATIVE        Historical Information   Past Medical History:   Diagnosis Date    Cardiomyopathy ([facility] [address] )     Hyperthyroidism     Thyroid disease      Past Surgical History:   Procedure Laterality Date    CHOLECYSTECTOMY LAPAROSCOPIC      last assessed: 4/23/15    COLONOSCOPY      EYE SURGERY      UPPER GASTROINTESTINAL ENDOSCOPY       Social History   History   Alcohol Use No     History   Drug Use No     History   Smoking Status    Never Smoker   Smokeless Tobacco    Never Used     Family History   Problem Relation Age of Onset    Diabetes Mother     Hyperlipidemia Mother     Thyroid cancer Mother     Dementia Father     Coronary artery disease Maternal Grandmother     Hypertension Maternal Grandmother     Heart attack Maternal Grandmother        Meds/Allergies       Current Outpatient Prescriptions:     acetaminophen (TYLENOL) 325 mg tablet    aspirin (ECOTRIN LOW STRENGTH) 81 mg EC tablet    brimonidine (ALPHAGAN P) 0 15 % ophthalmic solution    Cholecalciferol (VITAMIN D3) 2000 units capsule    diclofenac sodium (VOLTAREN) 1 %    docusate sodium (COLACE) 100 mg capsule    gemfibrozil (LOPID) 600 mg tablet    latanoprost (XALATAN) 0 005 % ophthalmic solution    levothyroxine 125 mcg tablet    LORazepam (ATIVAN) 1 mg tablet    polyethylene glycol (GLYCOLAX) powder    QUEtiapine (SEROQUEL) 100 mg tablet    timolol (TIMOPTIC) 0 5 % ophthalmic solution    traZODone (DESYREL) 100 mg tablet    vitamin B-12 (CYANOCOBALAMIN) 250 MCG tablet    fluticasone (FLONASE) 50 mcg/act nasal spray    gemfibrozil (LOPID) 600 mg tablet    loratadine (CLARITIN) 10 mg tablet    omeprazole (PriLOSEC) 40 MG capsule    polyethylene glycol (MIRALAX) 17 g packet    simethicone (MYLICON,GAS-X) 635 MG CAPS    No Known Allergies        Objective     Blood pressure 117/82, pulse 97, temperature (!) 97 3 °F (36 3 °C), temperature source Tympanic, height 5' 8 5" (1 74 m), weight 80 3 kg (177 lb)  Body mass index is 26 52 kg/m²        PHYSICAL EXAM:      General Appearance:   Alert, cooperative, no distress   HEENT:   Normocephalic, atraumatic, anicteric      Neck:  Supple, symmetrical, trachea midline   Lungs:   Clear to auscultation bilaterally; no rales, rhonchi or wheezing; respirations unlabored    Heart[de-identified]   Regular rate and rhythm; no murmur, rub, or gallop  Abdomen:   Soft, non-tender, non-distended; normal bowel sounds; no masses, no organomegaly    Genitalia:   Deferred    Rectal:   Deferred    Extremities:  No cyanosis, clubbing or edema    Pulses:  2+ and symmetric    Skin:  No jaundice, rashes, or lesions    Lymph nodes:  No palpable cervical lymphadenopathy        Lab Results:   No visits with results within 1 Day(s) from this visit  Latest known visit with results is:   Appointment on 12/14/2018   Component Date Value    Cholesterol 12/14/2018 192     Triglycerides 12/14/2018 202*    HDL, Direct 12/14/2018 46     LDL Calculated 12/14/2018 106*    Methylmalonic Acid, S 12/14/2018 237     Disclaimer: 12/14/2018 Comment     TSH 3RD GENERATON 12/14/2018 1 460          Radiology Results:   No results found

## 2019-02-04 DIAGNOSIS — E78.1 HYPERTRIGLYCERIDEMIA: ICD-10-CM

## 2019-02-05 RX ORDER — GEMFIBROZIL 600 MG/1
600 TABLET, FILM COATED ORAL
Qty: 60 TABLET | Refills: 2 | Status: SHIPPED | OUTPATIENT
Start: 2019-02-05 | End: 2019-03-21 | Stop reason: SDUPTHER

## 2019-02-27 DIAGNOSIS — J30.9 ALLERGIC RHINITIS: ICD-10-CM

## 2019-03-04 RX ORDER — FLUTICASONE PROPIONATE 50 MCG
1 SPRAY, SUSPENSION (ML) NASAL DAILY
Qty: 1 BOTTLE | Refills: 1 | Status: SHIPPED | OUTPATIENT
Start: 2019-03-04 | End: 2019-03-21 | Stop reason: SDUPTHER

## 2019-03-18 RX ORDER — TRAMADOL HYDROCHLORIDE 50 MG/1
50 TABLET ORAL EVERY 12 HOURS PRN
Status: ON HOLD | COMMUNITY
End: 2019-04-04 | Stop reason: ALTCHOICE

## 2019-03-18 RX ORDER — LOVASTATIN 20 MG/1
20 TABLET ORAL
COMMUNITY
End: 2019-03-21

## 2019-03-21 ENCOUNTER — OFFICE VISIT (OUTPATIENT)
Dept: INTERNAL MEDICINE CLINIC | Facility: CLINIC | Age: 54
End: 2019-03-21

## 2019-03-21 VITALS
HEART RATE: 96 BPM | TEMPERATURE: 97.6 F | BODY MASS INDEX: 26.19 KG/M2 | WEIGHT: 176.81 LBS | HEIGHT: 69 IN | SYSTOLIC BLOOD PRESSURE: 92 MMHG | DIASTOLIC BLOOD PRESSURE: 64 MMHG

## 2019-03-21 DIAGNOSIS — K21.9 GASTROESOPHAGEAL REFLUX DISEASE, ESOPHAGITIS PRESENCE NOT SPECIFIED: ICD-10-CM

## 2019-03-21 DIAGNOSIS — E53.8 LOW VITAMIN B12 LEVEL: ICD-10-CM

## 2019-03-21 DIAGNOSIS — E78.1 HYPERTRIGLYCERIDEMIA: ICD-10-CM

## 2019-03-21 DIAGNOSIS — E03.9 HYPOTHYROIDISM: ICD-10-CM

## 2019-03-21 DIAGNOSIS — Z23 NEED FOR PNEUMOCOCCAL VACCINATION: ICD-10-CM

## 2019-03-21 DIAGNOSIS — J30.9 ALLERGIC RHINITIS: ICD-10-CM

## 2019-03-21 DIAGNOSIS — R05.9 COUGH: ICD-10-CM

## 2019-03-21 DIAGNOSIS — L72.3 SEBACEOUS CYST: Primary | ICD-10-CM

## 2019-03-21 DIAGNOSIS — R14.0 BLOATING: ICD-10-CM

## 2019-03-21 DIAGNOSIS — K59.00 CONSTIPATION: ICD-10-CM

## 2019-03-21 PROCEDURE — 99213 OFFICE O/P EST LOW 20 MIN: CPT | Performed by: HOSPITALIST

## 2019-03-21 RX ORDER — POLYETHYLENE GLYCOL 3350 17 G/17G
17 POWDER, FOR SOLUTION ORAL DAILY
Qty: 119 G | Refills: 0 | Status: SHIPPED | OUTPATIENT
Start: 2019-03-21 | End: 2019-04-18 | Stop reason: SDUPTHER

## 2019-03-21 RX ORDER — ASPIRIN 81 MG/1
81 TABLET ORAL DAILY
Qty: 30 TABLET | Refills: 3 | Status: SHIPPED | OUTPATIENT
Start: 2019-03-21 | End: 2019-06-08 | Stop reason: SDUPTHER

## 2019-03-21 RX ORDER — LEVOTHYROXINE SODIUM 0.12 MG/1
137 TABLET ORAL DAILY
Qty: 90 TABLET | Refills: 0 | Status: SHIPPED | OUTPATIENT
Start: 2019-03-21 | End: 2019-06-11 | Stop reason: SDUPTHER

## 2019-03-21 RX ORDER — TIMOLOL MALEATE 5 MG/ML
1 SOLUTION/ DROPS OPHTHALMIC EVERY MORNING
Qty: 10 ML | Refills: 3 | Status: SHIPPED | OUTPATIENT
Start: 2019-03-21

## 2019-03-21 RX ORDER — LORATADINE 10 MG/1
10 TABLET ORAL DAILY
Qty: 30 TABLET | Refills: 0 | Status: ON HOLD | OUTPATIENT
Start: 2019-03-21 | End: 2019-04-04 | Stop reason: ALTCHOICE

## 2019-03-21 RX ORDER — ACETAMINOPHEN 160 MG
2000 TABLET,DISINTEGRATING ORAL DAILY
Qty: 30 CAPSULE | Refills: 3 | Status: SHIPPED | OUTPATIENT
Start: 2019-03-21 | End: 2019-06-08 | Stop reason: SDUPTHER

## 2019-03-21 RX ORDER — ACETAMINOPHEN 325 MG/1
650 TABLET ORAL EVERY 6 HOURS PRN
Qty: 30 TABLET | Refills: 0 | Status: SHIPPED | OUTPATIENT
Start: 2019-03-21 | End: 2020-03-25 | Stop reason: SDUPTHER

## 2019-03-21 RX ORDER — SIMETHICONE 125 MG
125 CAPSULE ORAL EVERY 8 HOURS PRN
Qty: 90 CAPSULE | Refills: 0 | Status: SHIPPED | OUTPATIENT
Start: 2019-03-21 | End: 2019-10-08 | Stop reason: SDUPTHER

## 2019-03-21 RX ORDER — OMEPRAZOLE 40 MG/1
40 CAPSULE, DELAYED RELEASE ORAL DAILY
Qty: 90 CAPSULE | Refills: 3 | Status: SHIPPED | OUTPATIENT
Start: 2019-03-21 | End: 2020-02-20 | Stop reason: SDUPTHER

## 2019-03-21 RX ORDER — FLUTICASONE PROPIONATE 50 MCG
1 SPRAY, SUSPENSION (ML) NASAL DAILY
Qty: 1 BOTTLE | Refills: 0 | Status: SHIPPED | OUTPATIENT
Start: 2019-03-21 | End: 2019-05-10 | Stop reason: SDUPTHER

## 2019-03-21 RX ORDER — GEMFIBROZIL 600 MG/1
600 TABLET, FILM COATED ORAL
Qty: 60 TABLET | Refills: 0 | Status: SHIPPED | OUTPATIENT
Start: 2019-03-21 | End: 2019-04-16 | Stop reason: SDUPTHER

## 2019-03-21 RX ORDER — TRAZODONE HYDROCHLORIDE 100 MG/1
100 TABLET ORAL
Qty: 30 TABLET | Refills: 3 | Status: SHIPPED | OUTPATIENT
Start: 2019-03-21 | End: 2019-06-21 | Stop reason: SDUPTHER
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
## 2019-03-21 NOTE — PATIENT INSTRUCTIONS
Hipotiroidismo   LO QUE NECESITA SABER:   El hipotiroidismo es cara condición que se desarrolla cuando la glándula tiroides no produce suficiente hormona tiroidea  Las hormonas de la tiroides ayudan a controlar la temperatura del cuerpo, el ritmo cardíaco, el crecimiento, y peso corporal   [place]:   Llame al 911 en danae de presentar lo siguiente:   · Usted tiene dolor de pecho repentino o falta de aire  · Usted sufre cara convulsión  · Usted siente que se va a desmayar  Regrese a la jose de emergencias si:   · Usted tiene diarrea, temblores o dificultad para dormir  · Liliana piernas, tobillos o pies están inflamados  Pregúntele a sanz [place] vitaminas y minerales son adecuados para usted  · Usted tiene fiebre  · Usted tiene escalofríos, tos o se siente débil y adolorido  · Usted tiene dolor e inflamación en los músculos y articulaciones  · Usted tiene comezón en la piel, inflamación o un sarpullido  · Liliana signos y síntomas regresan o [place], aún después del [place]  · Usted tiene preguntas o inquietudes acerca de sanz condición o cuidado  Medicamentos:   · El reemplazo de la hormona tiroidea  ayuda a regresar el nivel de la hormona tiroidea a sanz normalidad  · [place] liliana medicamentos rebecca se le haya indicado  Consulte con sanz médico si usted jl que sanz medicamento no le está ayudando o si presenta efectos secundarios  Infórmele si es alérgico a cualquier medicamento  Mantenga cara lista actualizada de los [place], las vitaminas y los productos herbales que bal  Incluya los siguientes datos de los medicamentos: cantidad, frecuencia y motivo de administración  Traiga con usted la lista o los envases de la pí[place]or[place] a liliana citas de seguimiento  Lleve la lista de los medicamentos con usted en danae de cara emergencia  Acuda a liliana consultas de control con sanz médico según le indicaron    Es probable que tenga que regresar para realizarse más exámenes de mariusz y controlar henrry niveles de la hormona tiroides  Los exámenes mostrarán si usted está recibiendo la cantidad correcta de medicamentos para la tiroides  Anote henrry preguntas para que se acuerde de hacerlas boogie henrry visitas  © 2017 [facility] Information is for End User's use only and may not be sold, redistributed or otherwise used for commercial purposes  All illustrations and images included in CareNotes® are the copyrighted property of A D A M , Inc  or [employer] [place]  Esta información es sólo para uso en educación  Sanz intención no es darle un consejo médico sobre enfermedades o tratamientos  Colsulte con sanz [place] farmacéutico antes de seguir cualquier régimen médico para saber si es seguro y efectivo para usted

## 2019-03-29 DIAGNOSIS — Z23 NEED FOR PNEUMOCOCCAL VACCINATION: ICD-10-CM

## 2019-04-03 DIAGNOSIS — Z23 NEED FOR PNEUMOCOCCAL VACCINATION: ICD-10-CM

## 2019-04-04 ENCOUNTER — ANESTHESIA (OUTPATIENT)
Dept: GASTROENTEROLOGY | Facility: HOSPITAL | Age: 54
End: 2019-04-04
Payer: COMMERCIAL

## 2019-04-04 ENCOUNTER — ANESTHESIA EVENT (OUTPATIENT)
Dept: GASTROENTEROLOGY | Facility: HOSPITAL | Age: 54
End: 2019-04-04
Payer: COMMERCIAL

## 2019-04-04 ENCOUNTER — HOSPITAL ENCOUNTER (OUTPATIENT)
Facility: HOSPITAL | Age: 54
Setting detail: OUTPATIENT SURGERY
Discharge: HOME | End: 2019-04-04
Attending: INTERNAL MEDICINE | Admitting: INTERNAL MEDICINE
Payer: COMMERCIAL

## 2019-04-04 VITALS
RESPIRATION RATE: 16 BRPM | SYSTOLIC BLOOD PRESSURE: 108 MMHG | HEART RATE: 85 BPM | DIASTOLIC BLOOD PRESSURE: 58 MMHG | OXYGEN SATURATION: 100 %

## 2019-04-04 PROCEDURE — 43235 EGD DIAGNOSTIC BRUSH WASH: CPT | Performed by: INTERNAL MEDICINE

## 2019-04-04 PROCEDURE — ERR1 ERRONEOUS ENCOUNTER-DISREGARD: Performed by: INTERNAL MEDICINE

## 2019-04-04 RX ORDER — PROPOFOL 10 MG/ML
INJECTION, EMULSION INTRAVENOUS AS NEEDED
Status: DISCONTINUED | OUTPATIENT
Start: 2019-04-04 | End: 2019-04-04 | Stop reason: SURG

## 2019-04-04 RX ORDER — SODIUM CHLORIDE 9 MG/ML
INJECTION, SOLUTION INTRAVENOUS CONTINUOUS PRN
Status: DISCONTINUED | OUTPATIENT
Start: 2019-04-04 | End: 2019-04-04 | Stop reason: SURG

## 2019-04-04 RX ADMIN — PROPOFOL 20 MG: 10 INJECTION, EMULSION INTRAVENOUS at 08:12

## 2019-04-04 RX ADMIN — PROPOFOL 30 MG: 10 INJECTION, EMULSION INTRAVENOUS at 08:10

## 2019-04-04 RX ADMIN — SODIUM CHLORIDE: 0.9 INJECTION, SOLUTION INTRAVENOUS at 08:01

## 2019-04-04 RX ADMIN — PROPOFOL 100 MG: 10 INJECTION, EMULSION INTRAVENOUS at 08:08

## 2019-04-08 DIAGNOSIS — Z23 NEED FOR PNEUMOCOCCAL VACCINATION: ICD-10-CM

## 2019-04-13 DIAGNOSIS — R05.9 COUGH: ICD-10-CM

## 2019-04-13 DIAGNOSIS — Z23 NEED FOR PNEUMOCOCCAL VACCINATION: ICD-10-CM

## 2019-04-15 ENCOUNTER — TELEPHONE (OUTPATIENT)
Dept: GASTROENTEROLOGY | Facility: AMBULARY SURGERY CENTER | Age: 54
End: 2019-04-15

## 2019-04-16 DIAGNOSIS — E78.1 HYPERTRIGLYCERIDEMIA: ICD-10-CM

## 2019-04-16 PROBLEM — K21.9 GASTROESOPHAGEAL REFLUX DISEASE WITHOUT ESOPHAGITIS: Status: ACTIVE | Noted: 2019-04-16

## 2019-04-16 RX ORDER — GEMFIBROZIL 600 MG/1
600 TABLET, FILM COATED ORAL
Qty: 60 TABLET | Refills: 0 | Status: SHIPPED | OUTPATIENT
Start: 2019-04-16 | End: 2019-05-11 | Stop reason: SDUPTHER

## 2019-04-16 RX ORDER — LORATADINE 10 MG/1
10 TABLET ORAL DAILY
Qty: 30 TABLET | Refills: 0 | Status: SHIPPED | OUTPATIENT
Start: 2019-04-16 | End: 2020-12-21 | Stop reason: SDUPTHER

## 2019-04-18 DIAGNOSIS — K59.00 CONSTIPATION: ICD-10-CM

## 2019-04-18 DIAGNOSIS — Z23 NEED FOR PNEUMOCOCCAL VACCINATION: ICD-10-CM

## 2019-04-22 RX ORDER — POLYETHYLENE GLYCOL 3350 17 G/17G
17 POWDER, FOR SOLUTION ORAL DAILY
Qty: 119 G | Refills: 0 | Status: SHIPPED | OUTPATIENT
Start: 2019-04-22 | End: 2019-05-18 | Stop reason: SDUPTHER

## 2019-04-24 DIAGNOSIS — Z23 NEED FOR PNEUMOCOCCAL VACCINATION: ICD-10-CM

## 2019-04-27 DIAGNOSIS — Z23 NEED FOR PNEUMOCOCCAL VACCINATION: ICD-10-CM

## 2019-05-10 DIAGNOSIS — J30.9 ALLERGIC RHINITIS: ICD-10-CM

## 2019-05-10 RX ORDER — FLUTICASONE PROPIONATE 50 MCG
1 SPRAY, SUSPENSION (ML) NASAL DAILY
Qty: 1 BOTTLE | Refills: 3 | Status: SHIPPED | OUTPATIENT
Start: 2019-05-10 | End: 2019-07-18 | Stop reason: SDUPTHER

## 2019-05-11 DIAGNOSIS — E78.1 HYPERTRIGLYCERIDEMIA: ICD-10-CM

## 2019-05-11 DIAGNOSIS — E53.8 LOW VITAMIN B12 LEVEL: ICD-10-CM

## 2019-05-13 RX ORDER — GEMFIBROZIL 600 MG/1
600 TABLET, FILM COATED ORAL
Qty: 60 TABLET | Refills: 3 | Status: SHIPPED | OUTPATIENT
Start: 2019-05-13 | End: 2019-08-16 | Stop reason: SDUPTHER

## 2019-05-18 DIAGNOSIS — K59.00 CONSTIPATION: ICD-10-CM

## 2019-05-18 RX ORDER — POLYETHYLENE GLYCOL 3350 17 G/17G
17 POWDER, FOR SOLUTION ORAL DAILY
Qty: 119 G | Refills: 5 | Status: SHIPPED | OUTPATIENT
Start: 2019-05-18 | End: 2019-07-09 | Stop reason: SDUPTHER

## 2019-05-21 ENCOUNTER — TELEPHONE (OUTPATIENT)
Dept: GASTROENTEROLOGY | Facility: CLINIC | Age: 54
End: 2019-05-21

## 2019-05-22 ENCOUNTER — TELEPHONE (OUTPATIENT)
Dept: INTERNAL MEDICINE CLINIC | Facility: CLINIC | Age: 54
End: 2019-05-22

## 2019-05-22 PROCEDURE — 88304 TISSUE EXAM BY PATHOLOGIST: CPT | Performed by: PATHOLOGY

## 2019-05-23 ENCOUNTER — TELEPHONE (OUTPATIENT)
Dept: INTERNAL MEDICINE CLINIC | Facility: CLINIC | Age: 54
End: 2019-05-23

## 2019-06-08 DIAGNOSIS — E03.9 HYPOTHYROIDISM: ICD-10-CM

## 2019-06-08 DIAGNOSIS — Z23 NEED FOR PNEUMOCOCCAL VACCINATION: ICD-10-CM

## 2019-06-08 RX ORDER — LEVOTHYROXINE SODIUM 0.12 MG/1
137 TABLET ORAL DAILY
Qty: 90 TABLET | Refills: 0 | OUTPATIENT
Start: 2019-06-08

## 2019-06-08 RX ORDER — ACETAMINOPHEN 160 MG
2000 TABLET,DISINTEGRATING ORAL DAILY
Qty: 30 CAPSULE | Refills: 3 | Status: SHIPPED | OUTPATIENT
Start: 2019-06-08 | End: 2020-11-10 | Stop reason: SDUPTHER

## 2019-06-08 RX ORDER — ASPIRIN 81 MG/1
81 TABLET ORAL DAILY
Qty: 30 TABLET | Refills: 3 | Status: SHIPPED | OUTPATIENT
Start: 2019-06-08 | End: 2020-02-20 | Stop reason: SDUPTHER

## 2019-06-10 DIAGNOSIS — E03.9 HYPOTHYROIDISM: ICD-10-CM

## 2019-06-10 NOTE — TELEPHONE ENCOUNTER
Pharmacy requesting refills ahead of time so patient is not completely out when next refill is due

## 2019-06-11 RX ORDER — LEVOTHYROXINE SODIUM 0.12 MG/1
137 TABLET ORAL DAILY
Qty: 90 TABLET | Refills: 0 | OUTPATIENT
Start: 2019-06-11

## 2019-06-11 RX ORDER — LEVOTHYROXINE SODIUM 0.12 MG/1
125 TABLET ORAL DAILY
Qty: 90 TABLET | Refills: 3 | Status: SHIPPED | OUTPATIENT
Start: 2019-06-11 | End: 2020-04-08 | Stop reason: SDUPTHER

## 2019-06-11 NOTE — TELEPHONE ENCOUNTER
Called patient and advised him that a refill was sent to [facility] According to our Records  Patient will check with his pharmacy and call us back if there is a problem

## 2019-06-19 ENCOUNTER — ANESTHESIA EVENT (OUTPATIENT)
Dept: GASTROENTEROLOGY | Facility: HOSPITAL | Age: 54
End: 2019-06-19

## 2019-06-20 ENCOUNTER — ANESTHESIA (OUTPATIENT)
Dept: GASTROENTEROLOGY | Facility: HOSPITAL | Age: 54
End: 2019-06-20

## 2019-06-20 ENCOUNTER — HOSPITAL ENCOUNTER (OUTPATIENT)
Dept: GASTROENTEROLOGY | Facility: HOSPITAL | Age: 54
Setting detail: OUTPATIENT SURGERY
Discharge: HOME | End: 2019-06-20
Attending: INTERNAL MEDICINE | Admitting: INTERNAL MEDICINE
Payer: COMMERCIAL

## 2019-06-20 VITALS
DIASTOLIC BLOOD PRESSURE: 58 MMHG | RESPIRATION RATE: 16 BRPM | HEIGHT: 69 IN | HEART RATE: 88 BPM | BODY MASS INDEX: 26.07 KG/M2 | OXYGEN SATURATION: 95 % | SYSTOLIC BLOOD PRESSURE: 103 MMHG | WEIGHT: 176 LBS | TEMPERATURE: 98.4 F

## 2019-06-20 DIAGNOSIS — K92.1 MELENA: ICD-10-CM

## 2019-06-20 DIAGNOSIS — K59.00 CONSTIPATION: ICD-10-CM

## 2019-06-20 DIAGNOSIS — R10.13 EPIGASTRIC PAIN: ICD-10-CM

## 2019-06-20 PROCEDURE — 88305 TISSUE EXAM BY PATHOLOGIST: CPT | Performed by: PATHOLOGY

## 2019-06-20 PROCEDURE — 45385 COLONOSCOPY W/LESION REMOVAL: CPT | Performed by: INTERNAL MEDICINE

## 2019-06-20 PROCEDURE — 43239 EGD BIOPSY SINGLE/MULTIPLE: CPT | Performed by: INTERNAL MEDICINE

## 2019-06-20 RX ORDER — LIDOCAINE HYDROCHLORIDE 10 MG/ML
0.5 INJECTION, SOLUTION EPIDURAL; INFILTRATION; INTRACAUDAL; PERINEURAL ONCE AS NEEDED
Status: DISCONTINUED | OUTPATIENT
Start: 2019-06-20 | End: 2019-06-24 | Stop reason: HOSPADM

## 2019-06-20 RX ORDER — PROPOFOL 10 MG/ML
INJECTION, EMULSION INTRAVENOUS CONTINUOUS PRN
Status: DISCONTINUED | OUTPATIENT
Start: 2019-06-20 | End: 2019-06-20 | Stop reason: SURG

## 2019-06-20 RX ORDER — PROPOFOL 10 MG/ML
INJECTION, EMULSION INTRAVENOUS AS NEEDED
Status: DISCONTINUED | OUTPATIENT
Start: 2019-06-20 | End: 2019-06-20 | Stop reason: SURG

## 2019-06-20 RX ORDER — SODIUM CHLORIDE, SODIUM LACTATE, POTASSIUM CHLORIDE, CALCIUM CHLORIDE 600; 310; 30; 20 MG/100ML; MG/100ML; MG/100ML; MG/100ML
125 INJECTION, SOLUTION INTRAVENOUS CONTINUOUS
Status: DISCONTINUED | OUTPATIENT
Start: 2019-06-20 | End: 2019-06-24 | Stop reason: HOSPADM

## 2019-06-20 RX ORDER — SODIUM CHLORIDE 9 MG/ML
INJECTION, SOLUTION INTRAVENOUS CONTINUOUS PRN
Status: DISCONTINUED | OUTPATIENT
Start: 2019-06-20 | End: 2019-06-20 | Stop reason: SURG

## 2019-06-20 RX ORDER — SODIUM CHLORIDE 9 MG/ML
50 INJECTION, SOLUTION INTRAVENOUS CONTINUOUS
Status: DISCONTINUED | OUTPATIENT
Start: 2019-06-20 | End: 2019-06-24 | Stop reason: HOSPADM

## 2019-06-20 RX ORDER — GLYCOPYRROLATE 0.2 MG/ML
INJECTION INTRAMUSCULAR; INTRAVENOUS AS NEEDED
Status: DISCONTINUED | OUTPATIENT
Start: 2019-06-20 | End: 2019-06-20 | Stop reason: SURG

## 2019-06-20 RX ADMIN — PROPOFOL 130 MCG/KG/MIN: 10 INJECTION, EMULSION INTRAVENOUS at 08:26

## 2019-06-20 RX ADMIN — PHENYLEPHRINE HYDROCHLORIDE 200 MCG: 10 INJECTION INTRAVENOUS at 08:26

## 2019-06-20 RX ADMIN — LIDOCAINE HYDROCHLORIDE 50 MG: 20 INJECTION, SOLUTION INTRAVENOUS at 08:26

## 2019-06-20 RX ADMIN — PROPOFOL 100 MG: 10 INJECTION, EMULSION INTRAVENOUS at 08:26

## 2019-06-20 RX ADMIN — PHENYLEPHRINE HYDROCHLORIDE 200 MCG: 10 INJECTION INTRAVENOUS at 09:09

## 2019-06-20 RX ADMIN — PHENYLEPHRINE HYDROCHLORIDE 200 MCG: 10 INJECTION INTRAVENOUS at 08:44

## 2019-06-20 RX ADMIN — SODIUM CHLORIDE 50 ML/HR: 0.9 INJECTION, SOLUTION INTRAVENOUS at 08:16

## 2019-06-20 RX ADMIN — SODIUM CHLORIDE: 9 INJECTION, SOLUTION INTRAVENOUS at 08:23

## 2019-06-20 RX ADMIN — GLYCOPYRROLATE 0.1 MG: 0.2 INJECTION, SOLUTION INTRAMUSCULAR; INTRAVENOUS at 08:25

## 2019-06-20 RX ADMIN — PHENYLEPHRINE HYDROCHLORIDE 200 MCG: 10 INJECTION INTRAVENOUS at 08:31

## 2019-06-20 RX ADMIN — PHENYLEPHRINE HYDROCHLORIDE 200 MCG: 10 INJECTION INTRAVENOUS at 08:33

## 2019-06-20 RX ADMIN — LIDOCAINE HYDROCHLORIDE 50 MG: 20 INJECTION, SOLUTION INTRAVENOUS at 08:25

## 2019-06-21 DIAGNOSIS — Z23 NEED FOR PNEUMOCOCCAL VACCINATION: ICD-10-CM

## 2019-06-24 RX ORDER — TRAZODONE HYDROCHLORIDE 100 MG/1
100 TABLET ORAL
Qty: 30 TABLET | Refills: 3 | Status: SHIPPED | OUTPATIENT
Start: 2019-06-24 | End: 2019-09-21 | Stop reason: SDUPTHER

## 2019-07-09 DIAGNOSIS — K59.00 CONSTIPATION: ICD-10-CM

## 2019-07-11 RX ORDER — POLYETHYLENE GLYCOL 3350 17 G/17G
17 POWDER, FOR SOLUTION ORAL DAILY
Qty: 119 G | Refills: 5 | Status: SHIPPED | OUTPATIENT
Start: 2019-07-11 | End: 2019-09-13 | Stop reason: SDUPTHER

## 2019-07-18 DIAGNOSIS — J30.9 ALLERGIC RHINITIS: ICD-10-CM

## 2019-07-19 RX ORDER — FLUTICASONE PROPIONATE 50 MCG
1 SPRAY, SUSPENSION (ML) NASAL DAILY
Qty: 16 G | Refills: 5 | Status: SHIPPED | OUTPATIENT
Start: 2019-07-19 | End: 2019-12-30 | Stop reason: SDUPTHER

## 2019-08-16 DIAGNOSIS — E78.1 HYPERTRIGLYCERIDEMIA: ICD-10-CM

## 2019-08-19 RX ORDER — GEMFIBROZIL 600 MG/1
600 TABLET, FILM COATED ORAL
Qty: 60 TABLET | Refills: 3 | Status: SHIPPED | OUTPATIENT
Start: 2019-08-19 | End: 2019-11-22 | Stop reason: SDUPTHER

## 2019-09-13 DIAGNOSIS — K59.00 CONSTIPATION: ICD-10-CM

## 2019-09-13 RX ORDER — POLYETHYLENE GLYCOL 3350 17 G/17G
17 POWDER, FOR SOLUTION ORAL DAILY
Qty: 255 G | Refills: 4 | Status: SHIPPED | OUTPATIENT
Start: 2019-09-13 | End: 2019-11-20 | Stop reason: SDUPTHER

## 2019-09-21 DIAGNOSIS — Z23 NEED FOR PNEUMOCOCCAL VACCINATION: ICD-10-CM

## 2019-09-23 RX ORDER — TRAZODONE HYDROCHLORIDE 100 MG/1
100 TABLET ORAL
Qty: 30 TABLET | Refills: 3 | Status: SHIPPED | OUTPATIENT
Start: 2019-09-23 | End: 2020-01-28 | Stop reason: SDUPTHER

## 2019-10-01 ENCOUNTER — APPOINTMENT (OUTPATIENT)
Dept: LAB | Facility: CLINIC | Age: 54
End: 2019-10-01
Payer: COMMERCIAL

## 2019-10-01 DIAGNOSIS — Z79.899 ENCOUNTER FOR LONG-TERM (CURRENT) USE OF OTHER MEDICATIONS: Primary | ICD-10-CM

## 2019-10-01 DIAGNOSIS — F20.3 UNDIFFERENTIATED SCHIZOPHRENIA (HCC): ICD-10-CM

## 2019-10-01 LAB
25(OH)D3 SERPL-MCNC: 34.3 NG/ML (ref 30–100)
ALBUMIN SERPL BCP-MCNC: 4.1 G/DL (ref 3.5–5)
ALP SERPL-CCNC: 75 U/L (ref 46–116)
ALT SERPL W P-5'-P-CCNC: 34 U/L (ref 12–78)
ANION GAP SERPL CALCULATED.3IONS-SCNC: 5 MMOL/L (ref 4–13)
AST SERPL W P-5'-P-CCNC: 21 U/L (ref 5–45)
BASOPHILS # BLD AUTO: 0.04 THOUSANDS/ΜL (ref 0–0.1)
BASOPHILS NFR BLD AUTO: 1 % (ref 0–1)
BILIRUB SERPL-MCNC: 0.45 MG/DL (ref 0.2–1)
BUN SERPL-MCNC: 15 MG/DL (ref 5–25)
CALCIUM SERPL-MCNC: 9.3 MG/DL (ref 8.3–10.1)
CHLORIDE SERPL-SCNC: 108 MMOL/L (ref 100–108)
CHOLEST SERPL-MCNC: 205 MG/DL (ref 50–200)
CO2 SERPL-SCNC: 28 MMOL/L (ref 21–32)
CREAT SERPL-MCNC: 1.14 MG/DL (ref 0.6–1.3)
EOSINOPHIL # BLD AUTO: 0.21 THOUSAND/ΜL (ref 0–0.61)
EOSINOPHIL NFR BLD AUTO: 4 % (ref 0–6)
ERYTHROCYTE [DISTWIDTH] IN BLOOD BY AUTOMATED COUNT: 13.4 % (ref 11.6–15.1)
FOLATE SERPL-MCNC: >20 NG/ML (ref 3.1–17.5)
GFR SERPL CREATININE-BSD FRML MDRD: 72 ML/MIN/1.73SQ M
GLUCOSE P FAST SERPL-MCNC: 96 MG/DL (ref 65–99)
GLUCOSE P FAST SERPL-MCNC: 97 MG/DL (ref 65–99)
HCT VFR BLD AUTO: 48.6 % (ref 36.5–49.3)
HDLC SERPL-MCNC: 41 MG/DL (ref 40–60)
HGB BLD-MCNC: 15.4 G/DL (ref 12–17)
IMM GRANULOCYTES # BLD AUTO: 0.07 THOUSAND/UL (ref 0–0.2)
IMM GRANULOCYTES NFR BLD AUTO: 1 % (ref 0–2)
LDLC SERPL CALC-MCNC: 127 MG/DL (ref 0–100)
LYMPHOCYTES # BLD AUTO: 2.07 THOUSANDS/ΜL (ref 0.6–4.47)
LYMPHOCYTES NFR BLD AUTO: 40 % (ref 14–44)
MCH RBC QN AUTO: 27.9 PG (ref 26.8–34.3)
MCHC RBC AUTO-ENTMCNC: 31.7 G/DL (ref 31.4–37.4)
MCV RBC AUTO: 88 FL (ref 82–98)
MONOCYTES # BLD AUTO: 0.54 THOUSAND/ΜL (ref 0.17–1.22)
MONOCYTES NFR BLD AUTO: 11 % (ref 4–12)
NEUTROPHILS # BLD AUTO: 2.2 THOUSANDS/ΜL (ref 1.85–7.62)
NEUTS SEG NFR BLD AUTO: 43 % (ref 43–75)
NONHDLC SERPL-MCNC: 164 MG/DL
NRBC BLD AUTO-RTO: 0 /100 WBCS
PLATELET # BLD AUTO: 217 THOUSANDS/UL (ref 149–390)
PMV BLD AUTO: 10.5 FL (ref 8.9–12.7)
POTASSIUM SERPL-SCNC: 3.7 MMOL/L (ref 3.5–5.3)
PROT SERPL-MCNC: 7.5 G/DL (ref 6.4–8.2)
RBC # BLD AUTO: 5.51 MILLION/UL (ref 3.88–5.62)
SODIUM SERPL-SCNC: 141 MMOL/L (ref 136–145)
TRIGL SERPL-MCNC: 186 MG/DL
TSH SERPL DL<=0.05 MIU/L-ACNC: 0.57 UIU/ML (ref 0.36–3.74)
VIT B12 SERPL-MCNC: 508 PG/ML (ref 100–900)
WBC # BLD AUTO: 5.13 THOUSAND/UL (ref 4.31–10.16)

## 2019-10-01 PROCEDURE — 80061 LIPID PANEL: CPT

## 2019-10-01 PROCEDURE — 85025 COMPLETE CBC W/AUTO DIFF WBC: CPT

## 2019-10-01 PROCEDURE — 82951 GLUCOSE TOLERANCE TEST (GTT): CPT

## 2019-10-01 PROCEDURE — 84443 ASSAY THYROID STIM HORMONE: CPT

## 2019-10-01 PROCEDURE — 82746 ASSAY OF FOLIC ACID SERUM: CPT

## 2019-10-01 PROCEDURE — 80307 DRUG TEST PRSMV CHEM ANLYZR: CPT

## 2019-10-01 PROCEDURE — 82607 VITAMIN B-12: CPT

## 2019-10-01 PROCEDURE — 82306 VITAMIN D 25 HYDROXY: CPT

## 2019-10-01 PROCEDURE — 80053 COMPREHEN METABOLIC PANEL: CPT

## 2019-10-01 PROCEDURE — 36415 COLL VENOUS BLD VENIPUNCTURE: CPT

## 2019-10-02 LAB
AMPHETAMINES UR QL SCN: NEGATIVE NG/ML
BARBITURATES UR QL SCN: NEGATIVE NG/ML
BENZODIAZ UR QL: NEGATIVE NG/ML
BZE UR QL: NEGATIVE NG/ML
CANNABINOIDS UR QL SCN: NEGATIVE NG/ML
METHADONE UR QL SCN: NEGATIVE NG/ML
OPIATES UR QL: NEGATIVE NG/ML
PCP UR QL: NEGATIVE NG/ML
PROPOXYPH UR QL SCN: NEGATIVE NG/ML

## 2019-10-08 ENCOUNTER — OFFICE VISIT (OUTPATIENT)
Dept: GASTROENTEROLOGY | Facility: CLINIC | Age: 54
End: 2019-10-08
Payer: COMMERCIAL

## 2019-10-08 VITALS
WEIGHT: 180.2 LBS | HEIGHT: 69 IN | BODY MASS INDEX: 26.69 KG/M2 | SYSTOLIC BLOOD PRESSURE: 113 MMHG | TEMPERATURE: 98.3 F | DIASTOLIC BLOOD PRESSURE: 71 MMHG | HEART RATE: 93 BPM

## 2019-10-08 DIAGNOSIS — M62.08 DIASTASIS RECTI: ICD-10-CM

## 2019-10-08 DIAGNOSIS — K59.00 CONSTIPATION, UNSPECIFIED CONSTIPATION TYPE: ICD-10-CM

## 2019-10-08 DIAGNOSIS — D36.9 TUBULAR ADENOMA: ICD-10-CM

## 2019-10-08 DIAGNOSIS — R14.0 BLOATING: ICD-10-CM

## 2019-10-08 DIAGNOSIS — K21.9 GASTROESOPHAGEAL REFLUX DISEASE WITHOUT ESOPHAGITIS: Primary | ICD-10-CM

## 2019-10-08 PROCEDURE — 99214 OFFICE O/P EST MOD 30 MIN: CPT | Performed by: INTERNAL MEDICINE

## 2019-10-08 RX ORDER — SIMETHICONE 125 MG
125 CAPSULE ORAL EVERY 8 HOURS PRN
Qty: 90 CAPSULE | Refills: 0 | Status: SHIPPED | OUTPATIENT
Start: 2019-10-08 | End: 2020-03-25 | Stop reason: SDUPTHER

## 2019-10-08 NOTE — PROGRESS NOTES
[facility] - Outpatient Follow-up Note  Earlene Carrion 53 y o  male MRN: [id number]  Encounter: [id number]          ASSESSMENT AND PLAN:      1  Gastroesophageal reflux disease without esophagitis  2  Bloating  -EGD without evidence of esophagitis  -biopsies without any specific pathology including H pylori or celiac disease  -continue with PPI once daily-symptoms controlled  -Gas-X as needed-refill provided  -trial of low FODMAP diet    3  Constipation, unspecified constipation type  -advised on titrating MiraLax to 1 bowel movement daily    4  Tubular adenoma  -tubular adenoma found in rectum  -next colonoscopy due in 5 years 2024    5  Diastasis Recti  -advised there is nothing to do for this     Follow up as needed  ______________________________________________________________________    SUBJECTIVE:  48-year-old male seen in follow-up for his complaints of GERD, constipation and follow-up after endoscopic evaluation  Patient still with constipation going every 2-3 days but only taking MiraLax once daily  His heartburn symptoms are under control with once daily PPI  Gas-X had help for him in the past but he has run out of his prescription  He underwent EGD/colonoscopy on 04/2019 with normal esophagus and nonspecific biopsies but were negative for H pylori and celiac disease  Colonoscopy showed revealed    REVIEW OF SYSTEMS IS OTHERWISE NEGATIVE        Historical Information   Past Medical History:   Diagnosis Date    Anxiety     Bipolar disorder ([facility] )     Cardiomyopathy ([facility] )     Depression     GERD (gastroesophageal reflux disease)     Glaucoma     Hiatal hernia     Hyperlipidemia     Hyperthyroidism     Thyroid disease      Past Surgical History:   Procedure Laterality Date    CHOLECYSTECTOMY      CHOLECYSTECTOMY LAPAROSCOPIC      last assessed: 4/23/15    COLONOSCOPY      EYE SURGERY      IA COLONOSCOPY FLX DX W/COLLJ SPEC WHEN PFRMD N/A 4/4/2019 Procedure: COLONOSCOPY;  Surgeon: Bk Vanegas MD;  Location: [facility]; Service: Gastroenterology    NM ESOPHAGOGASTRODUODENOSCOPY TRANSORAL DIAGNOSTIC N/A 4/4/2019    Procedure: ESOPHAGOGASTRODUODENOSCOPY (EGD); Surgeon: Bk Vanegas MD;  Location:  GI LAB; Service: Gastroenterology    UPPER GASTROINTESTINAL ENDOSCOPY       Social History   Social History     Substance and Sexual Activity   Alcohol Use No     Social History     Substance and Sexual Activity   Drug Use No     Social History     Tobacco Use   Smoking Status Never Smoker   Smokeless Tobacco Never Used     Family History   Problem Relation Age of Onset    Diabetes Mother     Hyperlipidemia Mother     Thyroid cancer Mother     Dementia Father     Coronary artery disease Maternal Grandmother     Hypertension Maternal Grandmother     Heart attack Maternal Grandmother        Meds/Allergies       Current Outpatient Medications:     acetaminophen (TYLENOL) 325 mg tablet    aspirin (ECOTRIN LOW STRENGTH) 81 mg EC tablet    brimonidine (ALPHAGAN P) 0 15 % ophthalmic solution    Cholecalciferol (VITAMIN D3) 2000 units capsule    diclofenac sodium (VOLTAREN) 1 %    fluticasone (FLONASE) 50 mcg/act nasal spray    gemfibrozil (LOPID) 600 mg tablet    latanoprost (XALATAN) 0 005 % ophthalmic solution    levothyroxine 125 mcg tablet    loratadine (CLARITIN) 10 mg tablet    LORazepam (ATIVAN) 1 mg tablet    omeprazole (PriLOSEC) 40 MG capsule    polyethylene glycol (GLYCOLAX) powder    QUEtiapine (SEROQUEL) 100 mg tablet    timolol (TIMOPTIC) 0 5 % ophthalmic solution    traZODone (DESYREL) 100 mg tablet    vitamin B-12 (CYANOCOBALAMIN) 250 MCG tablet    simethicone (MYLICON,GAS-X) 333 MG CAPS    No Known Allergies        Objective     Blood pressure 113/71, pulse 93, temperature 98 3 °F (36 8 °C), temperature source Tympanic, height 5' 9" (1 753 m), weight 81 7 kg (180 lb 3 2 oz)  Body mass index is 26 61 kg/m²        PHYSICAL EXAM: General Appearance:   Alert, cooperative, no distress   HEENT:   Normocephalic, atraumatic, anicteric      Neck:  Supple, symmetrical, trachea midline   Lungs:   Clear to auscultation bilaterally; no rales, rhonchi or wheezing; respirations unlabored    Heart[de-identified]   Regular rate and rhythm; no murmur, rub, or gallop  Abdomen:   Soft, non-tender, non-distended; normal bowel sounds; no masses, no organomegaly,+ diastasis recti    Genitalia:   Deferred    Rectal:   Deferred    Extremities:  No cyanosis, clubbing or edema        Skin:  No jaundice, rashes, or lesions    Lymph nodes:  No palpable cervical lymphadenopathy        Lab Results:   No visits with results within 1 Day(s) from this visit     Latest known visit with results is:   Appointment on 10/01/2019   Component Date Value    WBC 10/01/2019 5 13     RBC 10/01/2019 5 51     Hemoglobin 10/01/2019 15 4     Hematocrit 10/01/2019 48 6     MCV 10/01/2019 88     MCH 10/01/2019 27 9     MCHC 10/01/2019 31 7     RDW 10/01/2019 13 4     MPV 10/01/2019 10 5     Platelets 94/79/0625 217     nRBC 10/01/2019 0     Neutrophils Relative 10/01/2019 43     Immat GRANS % 10/01/2019 1     Lymphocytes Relative 10/01/2019 40     Monocytes Relative 10/01/2019 11     Eosinophils Relative 10/01/2019 4     Basophils Relative 10/01/2019 1     Neutrophils Absolute 10/01/2019 2 20     Immature Grans Absolute 10/01/2019 0 07     Lymphocytes Absolute 10/01/2019 2 07     Monocytes Absolute 10/01/2019 0 54     Eosinophils Absolute 10/01/2019 0 21     Basophils Absolute 10/01/2019 0 04     Sodium 10/01/2019 141     Potassium 10/01/2019 3 7     Chloride 10/01/2019 108     CO2 10/01/2019 28     ANION GAP 10/01/2019 5     BUN 10/01/2019 15     Creatinine 10/01/2019 1 14     Glucose, Fasting 10/01/2019 96     Calcium 10/01/2019 9 3     AST 10/01/2019 21     ALT 10/01/2019 34     Alkaline Phosphatase 10/01/2019 75     Total Protein 10/01/2019 7 5     Albumin 10/01/2019 4 1     Total Bilirubin 10/01/2019 0 45     eGFR 10/01/2019 72     Cholesterol 10/01/2019 205*    Triglycerides 10/01/2019 186*    HDL, Direct 10/01/2019 41     LDL Calculated 10/01/2019 127*    Non-HDL-Chol (CHOL-HDL) 10/01/2019 164     TSH 3RD GENERATON 10/01/2019 0 571     Vit D, 25-Hydroxy 10/01/2019 34 3     Vitamin B-12 10/01/2019 508     Folate 10/01/2019 >20 0*    Glucose, GTT - Fasting 10/01/2019 97     Amphetamine Screen, Ur 10/01/2019 Negative     Barbiturate Screen, Ur 10/01/2019 Negative     Cannabinoid Scrn, Ur 10/01/2019 Negative     Methadone Screen, Urine 10/01/2019 Negative     Opiate Scrn, Ur 10/01/2019 Negative     Phencyclidine (PCP), Makenzie Martinez* 10/01/2019 Negative     Benzodiazepines 10/01/2019 Negative     Cocaine (Metab ) Urine 10/01/2019 Negative     Propoxyphene Screen, Jian Carolina* 10/01/2019 Negative          Radiology Results:   No results found

## 2019-10-08 NOTE — PATIENT INSTRUCTIONS
Gases y distensión abdominal   LO QUE NECESITA SABER:   Los gases se tiffany dentro de sanz cuerpo cuando usted consume ciertos alimentos o aspira demasiado aire  La distensión abdominal es la sensación de opresión y saciedad causada por un exceso de gas  INSTRUCCIONES [facility]BRE [facility]:   Medicamentos:   · Medicamentos para aliviar los gases:  Los medicamentos que facilitan el alivio de gas, podrían reducir el dolor relacionado con sanz gas y distensión abdominal  Estos medicamentos son disponibles sin cara receta médica  · [place] henrry medicamentos rebecca se le haya indicado  Consulte con sanz médico si usted jl que sanz medicamento no le está ayudando o si presenta efectos secundarios  Infórmele si es alérgico a cualquier medicamento  Mantenga cara lista actualizada de los [place], las vitaminas y los productos herbales que bal  Incluya los siguientes datos de los medicamentos: cantidad, frecuencia y motivo de administración  Traiga con usted la lista o los envases de la pí[place]oras a henrry citas de seguimiento  Lleve la lista de los medicamentos con usted en danae de cara emergencia  Cómo manejar los gases y la distensión abdominal:   · Mantenga un registro: Anote lo que usted come y tabitha y con qué frecuencia pasa gas cada día  · Consuma y catarina lentamente:  Elija alimentos que no producen gases, rebecca carne, aves de juarez, pescado y [place]  Evite los alimentos que contienen mucha grasa y los vegetales o almidones que producen gases  No catarina bebidas carbonatadas  Después de Tobin Rachael, añada estos alimentos nuevamente en sanz Madiha Dunks a la vez  Si el alimento a que henrry síntomas vuelvan, elimínelo de sanz dieta nuevamente  · Ejercicio:  El ejercicio puede ayudar a aliviar los gases  · No fume ni mastique goma:  Fumar y masticar goma pueden provocar que trague aire  · Asegúrese de que sanz dentadura le calce correctamente:  Si sanz dentadura postiza se encuentra suelta, vaya a que se le ajuste   [place] dentaduras postizas pueden provocar que trague [employer]  Acuda a henrry consultas de control con gage médico según le indicaron  Anote henrry preguntas para que se acuerde de hacerlas boogie henrry visitas  Pregúntele a gage Sidra Gaxiola vitaminas y minerales son adecuados para usted  · Usted tiene fiebre  · Usted vomita o tiene diarrea  · Usted pierde peso sin proponérselo  · Usted tiene preguntas o inquietudes acerca de gage condición o cuidado  Regrese a la jose de emergencias si:   · Usted tiene dolor abdominal intenso  · Usted tiene [employer]  © 2017 [address] Information is for End User's use only and may not be sold, redistributed or otherwise used for commercial purposes  All illustrations and images included in CareNotes® are the copyrighted property of A D A M , Inc  or Kory Ny  Esta información es sólo para uso en educación  Gage intención no es darle un consejo médico sobre enfermedades o tratamientos  Colsulte con gage Joane Gal farmacéutico antes de seguir cualquier régimen médico para saber si es seguro y efectivo para usted  Estreñimiento   LO QUE NECESITA SABER:   El estreñimiento sucede cuando usted tiene evacuaciones intestinales duras y secas o pasa más tiempo del normal entre ellas  INSTRUCCIONES [facility]:   Regrese a la jose de emergencias si:   · Usted tiene mariusz en gage evacuaciones intestinales  · Usted tiene fiebre y dolor abdominal con el estreñimiento  Pregúntele a gage Sidra Gaxiola vitaminas y minerales son adecuados para usted  · El estreñimiento empeora  · Usted comienza a vomitar  · Usted tiene preguntas o inquietudes acerca de gage condición o cuidado  Medicamentos:   · Un medicamento o un suplemento de fibra  podría ayudarle a ablandar las evacuaciones intestinales   Un laxante podría aflojar o ayudar a relajar henrry intestinos para que pueda tener cara evacuación intestinal  [place] es probable que le den medicamentos para aumentar el líquido en henrry intestinos  El líquido puede llegar a movilizar las evacuaciones intestinales a través de henrry intestinos  · [place] henrry medicamentos rebecca se le haya indicado  Consulte con sanz médico si usted jl que sanz medicamento no le está ayudando o si presenta efectos secundarios  Infórmele si es alérgico a algún medicamento  Mantenga cara lista actualizada de los [place], las vitaminas y los productos herbales que bal  Incluya los siguientes datos de los medicamentos: cantidad, frecuencia y motivo de administración  Traiga con usted la lista o los envases de la píldoras a henrry citas de seguimiento  Lleve la lista de los medicamentos con usted en danae de cara emergencia  Controle sanz estreñimiento:   · [place] líquidos rebecca se le haya indicado  Puede que necesite beber más líquidos para ayudar a ablandar las evacuaciones y evacuar el intestino  Pregunte cuánto líquido debe moise cada día y cuáles líquidos son los más adecuados para usted  · Coma alimentos altos en fibras  [place] podría ayudar a disminuir el estreñimiento al aumentar el volumen de las evacuaciones intestinales  Alimentos altos en [address] frutas, vegetales, panes y cereales integrales, y frijoles  Sanz médico o dietista puede ayudarle a crear un plan alimenticio con alto contenido de [place]  · Realice actividad física con regularidad  La actividad física regular puede ayudarle a estimular henrry intestinos  Pregunte cuáles son los ejercicios más adecuados para usted  · [place] cara hora cada día para tener cara evacuación intestinal   [place] podría ayudar a sanz cuerpo a acostumbrarse a tener evacuaciones intestinales regulares   Inclínese hacia adelante mientras está sentado en el inodoro para facilitar la expulsión de la evacuación intestinal  Siéntese en el inodoro al menos por 10 minutos, incluso si usted no tiene cara evacuación intestinal   [place] a henrry consultas de control con sanz médico según le indicaron  Anote liliana preguntas para que se acuerde de hacerlas boogie liliana visitas  © 2017 [facility] Information is for End User's use only and may not be sold, redistributed or otherwise used for commercial purposes  All illustrations and images included in CareNotes® are the copyrighted property of A D A M , Inc  or Kory Ny  Esta información es sólo para uso en educación  Sanz intención no es darle un consejo médico sobre enfermedades o tratamientos  Colsulte con sanz Beronica Kluver farmacéutico antes de seguir cualquier régimen médico para saber si es seguro y efectivo para usted  Síndrome de intestino irritable   LO QUE NECESITA SABER:   El síndrome de intestino irritable es cara afección que impide la movilización de los alimentos a través de liliana intestinos normalmente  Los alimentos pueden movilizarse demasiado lento o demasiado rápido  [place] causa un abdomen inflado, aumento de gas, estreñimiento o diarrea  INSTRUCCIONES [facility]:   Regrese a la jose de emergencias si:   · Usted tiene dolor abdominal intenso  · Lil[place] evacuaciones intestinales son oscuras o contienen mariusz  Pregúntele a sanz Floy Livers vitaminas y minerales son adecuados para usted  · Usted tiene fiebre  · Usted siente dolor en el recto  · Sanz dolor abdominal no desaparece aun después de tratamiento  · Usted tiene preguntas o inquietudes acerca de sanz condición o cuidado  Medicamentos:   · El medicamento para la diarrea  ayuda a disminuir la cantidad de diarrea que usted tiene  Algunos de estos medicamentos cubren el intestino y [employer] liliana evacuaciones intestinales clifton menos acuosas  Otros medicamento antidiarreicos funcionan al [employer] alimentos que pasan por liliana intestinos pasen más lentamente  · Laxantes  ayudan con en el tratamiento de estreñimiento al  los alimentos y líquidos fuera de sanz estómago más rápidamente      · Los laxantes ablandan las evacuaciones intestinales para prevenir esfuerzo  · Relajantes musculares  disminuyen el dolor abdominal y los espasmos musculares  · [place] henrry medicamentos rebecca se le haya indicado  Consulte con sanz médico si usted jl que sanz medicamento no le está ayudando o si presenta efectos secundarios  Infórmele si es alérgico a algún medicamento  Mantenga cara lista actualizada de los [place], las vitaminas y los productos herbales que bal  Incluya los siguientes datos de los medicamentos: cantidad, frecuencia y motivo de administración  Traiga con usted la lista o los envases de la pí[place]oras a henrry citas de seguimiento  Lleve la lista de los medicamentos con usted en danae de cara emergencia  Controle el síndrome de intestino irritable:   · Consuma alimentos saludables y variados  Los alimentos saludables incluyen frutas, verduras, pan integral, productos lácteos bajos en grasa, frijoles, meena magras y pescado  Es posible que necesite evitar ciertos alimentos para disminuir henrry síntomas  · [address] se le haya indicado  Pregunte cuánto líquido debe moise cada día y cuáles líquidos son los más adecuados para usted  Para Isaiah, los mejores líquidos son Acie Reins, y [place]  · Realice actividad física con regularidad  Pida más información acerca de un plan de ejercicio adecuado para usted  El ejercicio podría reducir sanz presión sanguínea y podría mejorar sanz janie  · Controle el estrés  El estrés podría disminuir la habilidad de que sane y provocar enfermedades  Aprenda nuevas maneras de relajarse rebecca la respiración profunda  · Mantenga un diario  o un registro escrito de todos los alimentos y bebidas que usted consume y henrry síntomas, por 3 semanas  [place] Ivonne registro con usted a henrry citas de seguimiento  Acuda a henrry consultas de control con sanz médico según le indicaron  Anote henrry preguntas para que se acuerde de hacerlas boogie henrry visitas     © 2017 [address]  Information is for End User's use only and may not be sold, redistributed or otherwise used for commercial purposes  All illustrations and images included in CareNotes® are the copyrighted property of A KIANNA A M , Inc  or Kory Ny  Esta información es sólo para uso en educación  Sanz intención no es darle un consejo médico sobre enfermedades o tratamientos  Colsulte con sanz [place] [facility] farmacéutico antes de seguir cualquier régimen médico para saber si es seguro y efectivo para usted

## 2019-10-14 ENCOUNTER — CONSULT (OUTPATIENT)
Dept: SURGERY | Facility: CLINIC | Age: 54
End: 2019-10-14
Payer: COMMERCIAL

## 2019-10-14 VITALS
HEART RATE: 82 BPM | SYSTOLIC BLOOD PRESSURE: 108 MMHG | DIASTOLIC BLOOD PRESSURE: 76 MMHG | HEIGHT: 69 IN | TEMPERATURE: 97 F | WEIGHT: 180.2 LBS | BODY MASS INDEX: 26.69 KG/M2

## 2019-10-14 DIAGNOSIS — L72.3 SEBACEOUS CYST: Primary | ICD-10-CM

## 2019-10-14 PROCEDURE — 88304 TISSUE EXAM BY PATHOLOGIST: CPT | Performed by: PATHOLOGY

## 2019-10-14 PROCEDURE — 99214 OFFICE O/P EST MOD 30 MIN: CPT | Performed by: SURGERY

## 2019-10-14 NOTE — PROGRESS NOTES
Office Visit - General Surgery  Kaleigh Zuñiga MRN: [id number]  Encounter: [id number]    Assessment and Plan    Problem List Items Addressed This Visit        Musculoskeletal and Integument    Sebaceous cyst - Primary    Relevant Orders    Tissue Exam    Tissue Exam        Sebaceous cyst posterior scalp and mid upper back  -Both lesions excised in the office today (see procedure note below)  -Follow up pathology report  -Follow up in clinic 1 week for wound check (two stitches to be removed in upper back)    Chief Complaint:  Kaleigh Zuñiga is a 53 y o  male who presents for Mass (Consult mass on mid back and back of head)    Subjective  48yo male presents for evaluation of lesions in posterior scalp and mid upper back  Masses have been there for about a year  Have gotten bigger over time  No drainage  No fever  Masses cause pain and posterior scalp lesion makes it hard for him to cut his hair  Past Medical History  Past Medical History:   Diagnosis Date    Anxiety     [facility] disorder ([facility] [address] [facility]     Cardiomyopathy ([facility] [address] )     Depression     GERD (gastroesophageal reflux disease)     Glaucoma     Hiatal hernia     Hyperlipidemia     Hyperthyroidism     Thyroid disease        Past Surgical History  Past Surgical History:   Procedure Laterality Date    CHOLECYSTECTOMY      CHOLECYSTECTOMY LAPAROSCOPIC      last assessed: 4/23/15    COLONOSCOPY      EYE SURGERY      AR COLONOSCOPY FLX DX W/COLLJ SPEC WHEN PFRMD N/A 4/4/2019    Procedure: COLONOSCOPY;  Surgeon: Sage Schmidt MD;  [facility]; Service: Gastroenterology    AR ESOPHAGOGASTRODUODENOSCOPY TRANSORAL DIAGNOSTIC N/A 4/4/2019    Procedure: ESOPHAGOGASTRODUODENOSCOPY (EGD); Surgeon: Sage Schmidt MD;  [facility];   Service: Gastroenterology    UPPER GASTROINTESTINAL ENDOSCOPY         Family History  Family History   Problem Relation Age of Onset    Diabetes Mother     Hyperlipidemia Mother     Thyroid cancer Mother  Dementia Father     Coronary artery disease Maternal Grandmother     Hypertension Maternal Grandmother     Heart attack Maternal Grandmother        Social History  Social History     Socioeconomic History    Marital status: Single     Spouse name: None    Number of children: None    Years of education: None    Highest education level: None   Occupational History    None   Social Needs    Financial resource strain: None    Food insecurity:     Worry: None     Inability: None    Transportation needs:     Medical: None     Non-medical: None   Tobacco Use    Smoking status: Never Smoker    Smokeless tobacco: Never Used   Substance and Sexual Activity    Alcohol use: No    Drug use: No    Sexual activity: Not Currently   Lifestyle    Physical activity:     Days per week: None     Minutes per session: None    Stress: None   Relationships    Social connections:     Talks on phone: None     Gets together: None     Attends Denominational service: None     Active member of club or organization: None     Attends meetings of clubs or organizations: None     Relationship status: None    Intimate partner violence:     Fear of current or ex partner: None     Emotionally abused: None     Physically abused: None     Forced sexual activity: None   Other Topics Concern    None   Social History Narrative    Occasional caffeine use    Sedentary lifestyle        Medications  Current Outpatient Medications on File Prior to Visit   Medication Sig Dispense Refill    acetaminophen (TYLENOL) 325 mg tablet Take 2 tablets (650 mg total) by mouth every 6 (six) hours as needed for mild pain 30 tablet 0    aspirin (ECOTRIN LOW STRENGTH) 81 mg EC tablet Take 1 tablet (81 mg total) by mouth daily 30 tablet 3    brimonidine (ALPHAGAN P) 0 15 % ophthalmic solution Apply 1 drop to eye 2 (two) times a day      Cholecalciferol (VITAMIN D3) 2000 units capsule Take 1 capsule (2,000 Units total) by mouth daily 30 capsule 3    diclofenac sodium (VOLTAREN) 1 % Apply 2 g topically 4 (four) times a day 1 Tube 0    fluticasone (FLONASE) 50 mcg/act nasal spray 1 SPRAY INTO EACH NOSTRIL DAILY 16 g 5    gemfibrozil (LOPID) 600 mg tablet TAKE 1 TABLET (600 MG TOTAL) BY MOUTH 2 (TWO) TIMES A DAY BEFORE MEALS 60 tablet 3    latanoprost (XALATAN) 0 005 % ophthalmic solution Apply 1 drop to eye daily      levothyroxine 125 mcg tablet Take 1 tablet (125 mcg total) by mouth daily 90 tablet 3    loratadine (CLARITIN) 10 mg tablet Take 1 tablet (10 mg total) by mouth daily 30 tablet 0    LORazepam (ATIVAN) 1 mg tablet Take 1 mg by mouth daily      omeprazole (PriLOSEC) 40 MG capsule Take 1 capsule (40 mg total) by mouth daily 90 capsule 3    polyethylene glycol (GLYCOLAX) powder TAKE 17 G BY MOUTH DAILY 255 g 4    QUEtiapine (SEROQUEL) 100 mg tablet Take 100 mg by mouth daily at bedtime   simethicone (MYLICON,GAS-X) 577 MG CAPS Take 1 capsule (125 mg total) by mouth every 8 (eight) hours as needed for flatulence (stomach gas) 90 capsule 0    timolol (TIMOPTIC) 0 5 % ophthalmic solution Apply 1 drop to eye every morning 10 mL 3    traZODone (DESYREL) 100 mg tablet TAKE 1 TABLET (100 MG TOTAL) BY MOUTH DAILY AT BEDTIME 30 tablet 3    vitamin B-12 (CYANOCOBALAMIN) 250 MCG tablet Take 1 tablet (250 mcg total) by mouth daily 90 tablet 3     No current facility-administered medications on file prior to visit  Allergies  No Known Allergies    Review of Systems   Constitutional: Negative for appetite change and chills  HENT: Negative for congestion, sore throat and trouble swallowing  Eyes: Negative for pain, discharge, redness and itching  Respiratory: Negative for apnea, cough, shortness of breath and wheezing  Cardiovascular: Negative for chest pain, palpitations and leg swelling  Gastrointestinal: Negative for abdominal distention, abdominal pain, constipation, diarrhea, nausea and vomiting     Endocrine: Negative for cold intolerance and heat intolerance  Genitourinary: Negative for difficulty urinating, dysuria and frequency  Musculoskeletal: Negative for arthralgias, back pain and joint swelling  Skin: Negative for color change, pallor and rash  Allergic/Immunologic: Negative for environmental allergies and food allergies  Neurological: Negative for dizziness, seizures, facial asymmetry, numbness and headaches  Hematological: Negative for adenopathy  Does not bruise/bleed easily  Psychiatric/Behavioral: Negative for agitation, behavioral problems and hallucinations  Objective  Vitals:    10/14/19 1020   BP: 108/76   Pulse: 82   Temp: (!) 97 °F (36 1 °C)       Physical Exam   Constitutional: He is oriented to person, place, and time  He appears well-developed and well-nourished  HENT:   Head: Normocephalic and atraumatic  Eyes: Pupils are equal, round, and reactive to light  Conjunctivae and EOM are normal    Neck: Normal range of motion  Neck supple  Cardiovascular: Normal rate, regular rhythm and normal heart sounds  Pulmonary/Chest: Effort normal and breath sounds normal    Abdominal: Soft  Bowel sounds are normal    Musculoskeletal: Normal range of motion  He exhibits no edema  Neurological: He is alert and oriented to person, place, and time  Skin: Skin is warm and dry  Multiple small pustule appearing lesions in posterior scalp  Sebaceous cyst posterior scalp and upper mid back   Vitals reviewed  Skin excision  Date/Time: 10/14/2019 12:08 PM  Performed by: Km Holt MD  Authorized by: Km Holt MD     Procedure Details - Skin Excision:     Number of Lesions:  2  Lesion 1:     Body area:  Head/neck    Head/neck location:  Scalp       Closure complexity: simple       Informed consent was obtained  The patient was placed in a supine position and the hair over this lower posterior scalp lesion was shaved  Site was prepared and draped into a sterile field  Local was infiltrated   A #15 blade was used to make a horizontal incision over the lesion  Blunt dissection with a clamp was used to free up the lesion from the surrounding soft tissue  The capsule was violated with spillage of some pasty appearing contents  The capsule was removed in its entirety and placed in a specimen cup  Specimen was about 2cm x 2cm in size  The cavity was irrigated with saline and closed in layers with vicryl and then monocryl  Attention was then turned to the mid upper back lesion  Lesion 2:     Body area:  Trunk    Trunk location:  Back        Closure complexity: simple       The site was prepared and draped into a sterile field  Local anesthetic was infiltrated  A vertical incision was made with a #15 blade  Dissection with a ronnie clamp was used to free up the lesion from the surrounding soft tissues  The capsule was violated with spillage of white pasty material  The capsule was removed in its entirety  The lesion measured about 3cm x 3cm  The cavity was irrigated with saline  The wound was then closed in layers with vicryl suture and then two nylon sutures for skin closure  Then wound was dressed with 4x4 gauze and tape  The patient tolerated the procedure well  Counts were correct

## 2019-10-21 ENCOUNTER — OFFICE VISIT (OUTPATIENT)
Dept: SURGERY | Facility: CLINIC | Age: 54
End: 2019-10-21
Payer: COMMERCIAL

## 2019-10-21 VITALS — BODY MASS INDEX: 26.23 KG/M2 | HEART RATE: 88 BPM | WEIGHT: 177.6 LBS | TEMPERATURE: 97 F

## 2019-10-21 DIAGNOSIS — L72.0 EPIDERMOID CYST: Primary | ICD-10-CM

## 2019-10-21 PROCEDURE — 99212 OFFICE O/P EST SF 10 MIN: CPT | Performed by: SURGERY

## 2019-10-21 NOTE — PROGRESS NOTES
Office Visit - General Surgery  Alvie Seip MRN: [id number]  Encounter: [id number]    Assessment and Plan    Problem List Items Addressed This Visit        Other    Epidermoid cyst - Primary        Stitches (x2) removed from back  Follow up prn    Chief Complaint:  Alvie Seip is a 53 y o  male who presents for Suture / Staple Removal (Wound check  and suture removal)    Subjective  No issues  No pain  No drainage  Past Medical History  Past Medical History:   Diagnosis Date    Anxiety     [facility] [address] )     [facility]omyopathy ([facility] )     Depression     GERD (gastroesophageal reflux disease)     Glaucoma     Hiatal hernia     Hyperlipidemia     Hyperthyroidism     Thyroid disease        Past Surgical History  Past Surgical History:   Procedure Laterality Date    CHOLECYSTECTOMY      CHOLECYSTECTOMY LAPAROSCOPIC      last assessed: 4/23/15    COLONOSCOPY      EYE SURGERY      SC COLONOSCOPY FLX DX W/COLLJ SPEC WHEN PFRMD N/A 4/4/2019    Procedure: COLONOSCOPY;  Surgeon: Alis Rowland MD;  [facility]; Service: Gastroenterology    SC ESOPHAGOGASTRODUODENOSCOPY TRANSORAL DIAGNOSTIC N/A 4/4/2019    Procedure: ESOPHAGOGASTRODUODENOSCOPY (EGD); Surgeon: Alis Rowland MD;  [facility];   Service: Gastroenterology    UPPER GASTROINTESTINAL ENDOSCOPY         Family History  Family History   Problem Relation Age of Onset    Diabetes Mother     Hyperlipidemia Mother     Thyroid cancer Mother     Dementia Father     Coronary artery disease Maternal Grandmother     Hypertension Maternal Grandmother     Heart attack Maternal Grandmother        Social History  Social History     Socioeconomic History    Marital status: Single     Spouse name: None    Number of children: None    Years of education: None    Highest education level: None   Occupational History    None   Social Needs    Financial resource strain: None    Food insecurity:     Worry: None Inability: None    Transportation needs:     Medical: None     Non-medical: None   Tobacco Use    Smoking status: Never Smoker    Smokeless tobacco: Never Used   Substance and Sexual Activity    Alcohol use: No    Drug use: No    Sexual activity: Not Currently   Lifestyle    Physical activity:     Days per week: None     Minutes per session: None    Stress: None   Relationships    Social connections:     Talks on phone: None     Gets together: None     Attends Restorationism service: None     Active member of club or organization: None     Attends meetings of clubs or organizations: None     Relationship status: None    Intimate partner violence:     Fear of current or ex partner: None     Emotionally abused: None     Physically abused: None     Forced sexual activity: None   Other Topics Concern    None   Social History Narrative    Occasional caffeine use    Sedentary lifestyle        Medications  Current Outpatient Medications on File Prior to Visit   Medication Sig Dispense Refill    acetaminophen (TYLENOL) 325 mg tablet Take 2 tablets (650 mg total) by mouth every 6 (six) hours as needed for mild pain 30 tablet 0    aspirin (ECOTRIN LOW STRENGTH) 81 mg EC tablet Take 1 tablet (81 mg total) by mouth daily 30 tablet 3    brimonidine (ALPHAGAN P) 0 15 % ophthalmic solution Apply 1 drop to eye 2 (two) times a day      Cholecalciferol (VITAMIN D3) 2000 units capsule Take 1 capsule (2,000 Units total) by mouth daily 30 capsule 3    diclofenac sodium (VOLTAREN) 1 % Apply 2 g topically 4 (four) times a day 1 Tube 0    fluticasone (FLONASE) 50 mcg/act nasal spray 1 SPRAY INTO EACH NOSTRIL DAILY 16 g 5    gemfibrozil (LOPID) 600 mg tablet TAKE 1 TABLET (600 MG TOTAL) BY MOUTH 2 (TWO) TIMES A DAY BEFORE MEALS 60 tablet 3    latanoprost (XALATAN) 0 005 % ophthalmic solution Apply 1 drop to eye daily      levothyroxine 125 mcg tablet Take 1 tablet (125 mcg total) by mouth daily 90 tablet 3    loratadine (CLARITIN) 10 mg tablet Take 1 tablet (10 mg total) by mouth daily 30 tablet 0    LORazepam (ATIVAN) 1 mg tablet Take 1 mg by mouth daily      omeprazole (PriLOSEC) 40 MG capsule Take 1 capsule (40 mg total) by mouth daily 90 capsule 3    polyethylene glycol (GLYCOLAX) powder TAKE 17 G BY MOUTH DAILY 255 g 4    QUEtiapine (SEROQUEL) 100 mg tablet Take 100 mg by mouth daily at bedtime   simethicone (MYLICON,GAS-X) 245 MG CAPS Take 1 capsule (125 mg total) by mouth every 8 (eight) hours as needed for flatulence (stomach gas) 90 capsule 0    timolol (TIMOPTIC) 0 5 % ophthalmic solution Apply 1 drop to eye every morning 10 mL 3    traZODone (DESYREL) 100 mg tablet TAKE 1 TABLET (100 MG TOTAL) BY MOUTH DAILY AT BEDTIME 30 tablet 3    vitamin B-12 (CYANOCOBALAMIN) 250 MCG tablet Take 1 tablet (250 mcg total) by mouth daily 90 tablet 3     No current facility-administered medications on file prior to visit  Allergies  No Known Allergies    Review of Systems   Constitutional: Negative for chills and fever  Respiratory: Negative for chest tightness and shortness of breath  Cardiovascular: Negative for chest pain and leg swelling  Gastrointestinal: Negative for abdominal distention and abdominal pain  Skin: Negative for color change and rash  Objective  Vitals:    10/21/19 0956   Pulse: 88   Temp: (!) 97 °F (36 1 °C)       Physical Exam   Constitutional: He is oriented to person, place, and time  He appears well-developed and well-nourished  Cardiovascular: Normal rate and regular rhythm  Pulmonary/Chest: Effort normal  No respiratory distress  Abdominal: Soft  He exhibits no distension  Neurological: He is alert and oriented to person, place, and time  Skin: Skin is warm and dry  Posterior scalp wound well healed  Back wound healed with overlying scab   No surrounding erythema

## 2019-11-04 ENCOUNTER — OFFICE VISIT (OUTPATIENT)
Dept: INTERNAL MEDICINE CLINIC | Facility: CLINIC | Age: 54
End: 2019-11-04

## 2019-11-04 VITALS
WEIGHT: 180.78 LBS | DIASTOLIC BLOOD PRESSURE: 64 MMHG | BODY MASS INDEX: 26.78 KG/M2 | HEIGHT: 69 IN | TEMPERATURE: 98.1 F | SYSTOLIC BLOOD PRESSURE: 112 MMHG | HEART RATE: 96 BPM

## 2019-11-04 DIAGNOSIS — M25.561 CHRONIC PAIN OF BOTH KNEES: Primary | ICD-10-CM

## 2019-11-04 DIAGNOSIS — M25.562 CHRONIC PAIN OF BOTH KNEES: Primary | ICD-10-CM

## 2019-11-04 DIAGNOSIS — Z23 NEED FOR PROPHYLACTIC VACCINATION AND INOCULATION AGAINST INFLUENZA: ICD-10-CM

## 2019-11-04 DIAGNOSIS — G89.29 CHRONIC PAIN OF BOTH KNEES: Primary | ICD-10-CM

## 2019-11-04 PROCEDURE — 90682 RIV4 VACC RECOMBINANT DNA IM: CPT | Performed by: INTERNAL MEDICINE

## 2019-11-04 PROCEDURE — 3008F BODY MASS INDEX DOCD: CPT | Performed by: INTERNAL MEDICINE

## 2019-11-04 PROCEDURE — G0008 ADMIN INFLUENZA VIRUS VAC: HCPCS | Performed by: INTERNAL MEDICINE

## 2019-11-04 PROCEDURE — 1036F TOBACCO NON-USER: CPT | Performed by: INTERNAL MEDICINE

## 2019-11-04 PROCEDURE — 99213 OFFICE O/P EST LOW 20 MIN: CPT | Performed by: INTERNAL MEDICINE

## 2019-11-04 NOTE — PROGRESS NOTES
[facility]  INTERNAL MEDICINE ACUTE VISIT     PATIENT INFORMATION     Luis Manuel Charles   53 y o  male   MRN: [id number]    ASSESSMENT/PLAN     Diagnoses and all orders for this visit:    1  Chronic pain of both knees  · MRI of left knee in 2015 showed small ganglion cyst at the medial gastrocnemius origin but was otherwise unremarkable  · X-ray of the right knee in 2016 was unremarkable apart from minimal spurring noted off the patella  · Has reportedly trialed oral NSAIDs in the past with only minimal relief  · Has also tried PT in the past  · Has been using Tylenol and Voltaren gel with only mild relief  · May be worsening osteoarthritis although examination findings are fairly unimpressive  · Will order referral to Orthopedic surgery for evaluation and possible intra-articular joint injection as patient states that steroid injections in the past did offer some relief  · Advised patient to continue Tylenol and Voltaren gel  · Ambulatory referral to Orthopedic Surgery; Future    2  Need for prophylactic vaccination and inoculation against influenza  · Influenza vaccine, 2105-0458, quadrivalent, recombinant, PF, 0 5 mL, for patients 18 yr+ (FLUBLOK)      CHIEF COMPLAINT     Chief Complaint   Patient presents with    Knee Pain     HISTORY OF PRESENT ILLNESS     79-year-old male presents to office with acute complaint of acute on chronic bilateral knee pain, right greater than left  Patient states that he has had bilateral knee pain for many years which has progressively worsened  States that it has affected his ability to ambulate well  Denies any paresthesias  Patient states he has taken Tylenol and has been using Voltaren gel with only mild relief  He has previously trialed oral NSAIDs and PT several years ago that did not provide any significant improvement  Patient states that he has also received steroid injections in the past that did provide some relief    He denies any recent injury  Offers no other acute complaints  REVIEW OF SYSTEMS     Review of Systems   Musculoskeletal: Positive for arthralgias (Bilateral knee pain, right greater than left)  All other systems reviewed and are negative  OBJECTIVE     Vitals:    11/04/19 1307   BP: 112/64   Pulse: 96   Temp: 98 1 °F (36 7 °C)   Weight: 82 kg (180 lb 12 4 oz)   Height: 5' 9" (1 753 m)     Physical Exam   Constitutional: He is oriented to person, place, and time  He appears well-developed  No distress  HENT:   Head: Normocephalic and atraumatic  Nose: Nose normal    Mouth/Throat: Oropharynx is clear and moist    Eyes: Conjunctivae and EOM are normal  No scleral icterus  Neck: Neck supple  No tracheal deviation present  Cardiovascular: Normal rate, regular rhythm, normal heart sounds and intact distal pulses  Pulmonary/Chest: Effort normal and breath sounds normal  No stridor  No respiratory distress  He has no wheezes  He has no rales  Abdominal: Soft  Bowel sounds are normal    Musculoskeletal: He exhibits no edema or deformity  Full active and passive ROM of bilateral knees, minimal crepitus, no palpable joint effusion or point tenderness   Neurological: He is alert and oriented to person, place, and time  No cranial nerve deficit  Skin: Skin is warm and dry  No rash noted  He is not diaphoretic  No erythema  No pallor  Nursing note and vitals reviewed      CURRENT MEDICATIONS     Current Outpatient Medications:     acetaminophen (TYLENOL) 325 mg tablet, Take 2 tablets (650 mg total) by mouth every 6 (six) hours as needed for mild pain, Disp: 30 tablet, Rfl: 0    aspirin (ECOTRIN LOW STRENGTH) 81 mg EC tablet, Take 1 tablet (81 mg total) by mouth daily, Disp: 30 tablet, Rfl: 3    brimonidine (ALPHAGAN P) 0 15 % ophthalmic solution, Apply 1 drop to eye 2 (two) times a day, Disp: , Rfl:     Cholecalciferol (VITAMIN D3) 2000 units capsule, Take 1 capsule (2,000 Units total) by mouth daily, Disp: 30 capsule, Rfl: 3    diclofenac sodium (VOLTAREN) 1 %, Apply 2 g topically 4 (four) times a day, Disp: 1 Tube, Rfl: 0    fluticasone (FLONASE) 50 mcg/act nasal spray, 1 SPRAY INTO EACH NOSTRIL DAILY, Disp: 16 g, Rfl: 5    latanoprost (XALATAN) 0 005 % ophthalmic solution, Apply 1 drop to eye daily, Disp: , Rfl:     levothyroxine 125 mcg tablet, Take 1 tablet (125 mcg total) by mouth daily, Disp: 90 tablet, Rfl: 3    LORazepam (ATIVAN) 1 mg tablet, Take 1 mg by mouth daily, Disp: , Rfl:     omeprazole (PriLOSEC) 40 MG capsule, Take 1 capsule (40 mg total) by mouth daily, Disp: 90 capsule, Rfl: 3    QUEtiapine (SEROQUEL) 100 mg tablet, Take 100 mg by mouth daily at bedtime  , Disp: , Rfl:     timolol (TIMOPTIC) 0 5 % ophthalmic solution, Apply 1 drop to eye every morning, Disp: 10 mL, Rfl: 3    traZODone (DESYREL) 100 mg tablet, TAKE 1 TABLET (100 MG TOTAL) BY MOUTH DAILY AT BEDTIME, Disp: 30 tablet, Rfl: 3    vitamin B-12 (CYANOCOBALAMIN) 250 MCG tablet, Take 1 tablet (250 mcg total) by mouth daily, Disp: 90 tablet, Rfl: 3    gemfibrozil (LOPID) 600 mg tablet, TAKE 1 TABLET (600 MG TOTAL) BY MOUTH 2 (TWO) TIMES A DAY BEFORE MEALS, Disp: 60 tablet, Rfl: 3    loratadine (CLARITIN) 10 mg tablet, Take 1 tablet (10 mg total) by mouth daily (Patient not taking: Reported on 11/4/2019), Disp: 30 tablet, Rfl: 0    polyethylene glycol (GLYCOLAX) powder, TAKE 17 G BY MOUTH DAILY, Disp: 255 g, Rfl: 4    simethicone (MYLICON,GAS-X) 012 MG CAPS, Take 1 capsule (125 mg total) by mouth every 8 (eight) hours as needed for flatulence (stomach gas), Disp: 90 capsule, Rfl: 0    PAST MEDICAL & SURGICAL HISTORY     Past Medical History:   Diagnosis Date    Anxiety     [facility] [address] [facility]     [facility]     Depression     GERD (gastroesophageal reflux disease)     Glaucoma     Hiatal hernia     Hyperlipidemia     Hyperthyroidism     Thyroid disease      Past Surgical History:   Procedure Laterality Date  CHOLECYSTECTOMY      CHOLECYSTECTOMY LAPAROSCOPIC      last assessed: 4/23/15    COLONOSCOPY      EYE SURGERY      CT COLONOSCOPY FLX DX W/COLLJ SPEC WHEN PFRMD N/A 4/4/2019    Procedure: COLONOSCOPY;  Surgeon: Clarissa Ledezma MD;  [facility]; Service: Gastroenterology    CT ESOPHAGOGASTRODUODENOSCOPY TRANSORAL DIAGNOSTIC N/A 4/4/2019    Procedure: ESOPHAGOGASTRODUODENOSCOPY (EGD); Surgeon: Clarissa Ledezma MD;  [facility];   Service: Gastroenterology    UPPER GASTROINTESTINAL ENDOSCOPY       SOCIAL & FAMILY HISTORY     Social History     Socioeconomic History    Marital status: Single     Spouse name: Not on file    Number of children: Not on file    Years of education: Not on file    Highest education level: Not on file   Occupational History    Not on file   Social Needs    Financial resource strain: Not on file    Food insecurity:     Worry: Not on file     Inability: Not on file    Transportation needs:     Medical: Not on file     Non-medical: Not on file   Tobacco Use    Smoking status: Never Smoker    Smokeless tobacco: Never Used   Substance and Sexual Activity    Alcohol use: No    Drug use: No    Sexual activity: Not Currently   Lifestyle    Physical activity:     Days per week: Not on file     Minutes per session: Not on file    Stress: Not on file   Relationships    Social connections:     Talks on phone: Not on file     Gets together: Not on file     Attends Jew service: Not on file     Active member of club or organization: Not on file     Attends meetings of clubs or organizations: Not on file     Relationship status: Not on file    Intimate partner violence:     Fear of current or ex partner: Not on file     Emotionally abused: Not on file     Physically abused: Not on file     Forced sexual activity: Not on file   Other Topics Concern    Not on file   Social History Narrative    Occasional caffeine use    Sedentary lifestyle     Social History Substance and Sexual Activity   Alcohol Use No     Social History     Substance and Sexual Activity   Drug Use No     Social History     Tobacco Use   Smoking Status Never Smoker   Smokeless Tobacco Never Used     Family History   Problem Relation Age of Onset    Diabetes Mother     Hyperlipidemia Mother     Thyroid cancer Mother     Dementia Father     Coronary artery disease Maternal Grandmother     Hypertension Maternal Grandmother     Heart attack Maternal Grandmother      ==  Lázaro Boone DO  Chief Resident, PGY-3  [facility] [address]  [address] [facility] , [address] [address] [facility], [address] [facility]  Office: [phone]  Fax: [phone]

## 2019-11-04 NOTE — PATIENT INSTRUCTIONS
Dolor de rodilla   LO QUE NECESITA SABER:   El dolor de rodilla puede empezar de forma repentina, o ser un problema a [place]  Puede sentir dolor en la parte lateral, delantera o trasera de la rodilla  Puede tener la rodilla rígida e hinchada  Puede oír sonidos de chasquido o sentir que la rodilla cede o se le bloquea al andar  Puede sentir dolor cuando se sienta, se pone de pie, camina o sube y baja escaleras  El dolor de rodilla puede estar provocado por condiciones rebecca obesidad, inflamación, esguinces o desgarros de los ligamentos o los tendones  INSTRUCCIONES SOBRE [facility]:   Yin Jimenez en 24 horas a cara gerry de seguimiento con sanz médico o rebecca se le indique:  Vickie Minium necesite tratamientos de seguimiento, rebecca inyecciones de esteroides para reducir el dolor  Anote henrry preguntas para que se acuerde de hacerlas boogie henrry visitas  Cuidados personales:   · Descanse  la rodilla para que se pueda curar  Limite las actividades que aumenten el dolor  · El hielo  puede reducir la inflamación  Envuelva hielo en cara jovan y Gilbert que le recomienden y con la frecuencia que le indiquen  · La compresión  con cara Cherilynn Ar o cara venda puede ayudar a reducir la hinchazón  Use cara férula o venda solamente si sigue las instrucciones del danae  · La elevación  ayuda a calmar el dolor y bajar la inflamación  Eleve la rodilla mientras esté sentado o acostado  Apoye la pierna sobre almohadones para mantener la rodilla por encima del corazón  Medicamentos:   · AINEs (Analgésicos antiinflamatorios no esteroides)  pueden disminuir la inflamación y el dolor o la fiebre  Jhonny medicamento esta disponible con o sin cara receta médica  Los AINEs pueden causar sangrado estomacal o problemas renales en ciertas personas  Si usted bal un medicamento anticoagulante, siempre pregúntele a sanz médico si los RUTHIE son seguros para usted   Siempre nyasia la etiqueta de jhonny medicamento y [address]  · El acetaminofén  Kissousa el dolor y baja la fiebre  Está disponible sin receta médica  Pregunte cuánto moise y cuándo  [address]  El acetaminofén puede causar daño en el hígado cuando no se bal de forma correcta  · Sayre henrry medicamentos rebecca se le haya indicado  Consulte con gage médico si usted jl que gage medicamento no le está ayudando o si presenta efectos secundarios  Infórmele si es alérgico a cualquier medicamento  Mantenga cara lista actualizada de los [place], las vitaminas y los productos herbales que bal  Incluya los siguientes datos de los medicamentos: cantidad, frecuencia y motivo de administración  Traiga con usted la lista o los envases de la píldoras a henrry citas de seguimiento  Lleve la lista de los medicamentos con usted en danae de cara emergencia  Ejercítese según indicaciones:  Es posible que deba consultar con un fisioterapeuta o hacer los ejercicios que le recomienden para mejorar la movilidad y reducir el dolor  Sary Violet le aconsejen que camine, nade o monte en bicicleta  Siga gage plan de ejercicios exactamente del modo que le [place] indicado para evitar más lesiones  Pregúntele a gage Pierson Fuchs vitaminas y minerales son adecuados para usted  · Usted tiene preguntas o inquietudes acerca de gage condición o cuidado  Regrese a la jose de emergencias si:   · El dolor empeora, [address]  · No puede flexionar o enderezar la pierna completamente  · La hinchazón alrededor de la rodilla no disminuye a pesar del tratamiento  · La rodilla le duele y se nota caliente al tacto  © 2017 [address] Information is for End User's use only and may not be sold, redistributed or otherwise used for commercial purposes  All illustrations and images included in CareNotes® are the copyrighted property of A D A M , Inc  or Kory Ny  Esta información es sólo para uso en educación   Gage intención no es darle un consejo [employer] o tratamientos  Colsulte con sanz Claude Tdods farmacéutico antes de seguir cualquier régimen médico para saber si es seguro y efectivo para usted

## 2019-11-20 DIAGNOSIS — K59.00 CONSTIPATION: ICD-10-CM

## 2019-11-21 RX ORDER — POLYETHYLENE GLYCOL 3350 17 G/17G
17 POWDER, FOR SOLUTION ORAL DAILY
Qty: 255 G | Refills: 0 | Status: SHIPPED | OUTPATIENT
Start: 2019-11-21 | End: 2019-12-02 | Stop reason: SDUPTHER

## 2019-11-22 DIAGNOSIS — E78.1 HYPERTRIGLYCERIDEMIA: ICD-10-CM

## 2019-11-22 RX ORDER — GEMFIBROZIL 600 MG/1
600 TABLET, FILM COATED ORAL
Qty: 60 TABLET | Refills: 0 | Status: SHIPPED | OUTPATIENT
Start: 2019-11-22 | End: 2019-12-18 | Stop reason: SDUPTHER

## 2019-12-02 DIAGNOSIS — K59.00 CONSTIPATION: ICD-10-CM

## 2019-12-03 RX ORDER — POLYETHYLENE GLYCOL 3350 17 G/17G
POWDER, FOR SOLUTION ORAL
Qty: 255 G | Refills: 0 | Status: SHIPPED | OUTPATIENT
Start: 2019-12-03 | End: 2020-09-01 | Stop reason: SDUPTHER

## 2019-12-13 DIAGNOSIS — K59.00 CONSTIPATION: ICD-10-CM

## 2019-12-16 RX ORDER — POLYETHYLENE GLYCOL 3350 17 G/17G
POWDER, FOR SOLUTION ORAL
Qty: 255 G | Refills: 0 | OUTPATIENT
Start: 2019-12-16

## 2019-12-17 DIAGNOSIS — E78.1 HYPERTRIGLYCERIDEMIA: ICD-10-CM

## 2019-12-17 RX ORDER — GEMFIBROZIL 600 MG/1
600 TABLET, FILM COATED ORAL
Qty: 60 TABLET | Refills: 0 | OUTPATIENT
Start: 2019-12-17

## 2019-12-18 DIAGNOSIS — E78.1 HYPERTRIGLYCERIDEMIA: ICD-10-CM

## 2019-12-18 RX ORDER — GEMFIBROZIL 600 MG/1
600 TABLET, FILM COATED ORAL
Qty: 60 TABLET | Refills: 2 | Status: SHIPPED | OUTPATIENT
Start: 2019-12-18 | End: 2020-02-28

## 2019-12-30 DIAGNOSIS — J30.9 ALLERGIC RHINITIS: ICD-10-CM

## 2019-12-31 RX ORDER — FLUTICASONE PROPIONATE 50 MCG
SPRAY, SUSPENSION (ML) NASAL
Qty: 16 G | Refills: 0 | Status: SHIPPED | OUTPATIENT
Start: 2019-12-31 | End: 2020-01-28 | Stop reason: SDUPTHER

## 2020-01-17 DIAGNOSIS — J30.9 ALLERGIC RHINITIS: ICD-10-CM

## 2020-01-17 DIAGNOSIS — Z23 NEED FOR PNEUMOCOCCAL VACCINATION: ICD-10-CM

## 2020-01-28 DIAGNOSIS — J30.9 ALLERGIC RHINITIS: ICD-10-CM

## 2020-01-28 DIAGNOSIS — Z23 NEED FOR PNEUMOCOCCAL VACCINATION: ICD-10-CM

## 2020-01-28 RX ORDER — FLUTICASONE PROPIONATE 50 MCG
1 SPRAY, SUSPENSION (ML) NASAL DAILY
Qty: 16 G | Refills: 0 | Status: SHIPPED | OUTPATIENT
Start: 2020-01-28 | End: 2020-01-29 | Stop reason: SDUPTHER

## 2020-01-28 RX ORDER — TRAZODONE HYDROCHLORIDE 100 MG/1
100 TABLET ORAL
Qty: 30 TABLET | Refills: 0 | OUTPATIENT
Start: 2020-01-28

## 2020-01-28 RX ORDER — FLUTICASONE PROPIONATE 50 MCG
SPRAY, SUSPENSION (ML) NASAL
Qty: 16 G | Refills: 0 | OUTPATIENT
Start: 2020-01-28

## 2020-01-28 RX ORDER — TRAZODONE HYDROCHLORIDE 100 MG/1
100 TABLET ORAL
Qty: 30 TABLET | Refills: 1 | Status: SHIPPED | OUTPATIENT
Start: 2020-01-28 | End: 2020-01-29 | Stop reason: SDUPTHER

## 2020-01-29 DIAGNOSIS — Z23 NEED FOR PNEUMOCOCCAL VACCINATION: ICD-10-CM

## 2020-01-29 DIAGNOSIS — J30.9 ALLERGIC RHINITIS: ICD-10-CM

## 2020-01-29 RX ORDER — TRAZODONE HYDROCHLORIDE 100 MG/1
100 TABLET ORAL
Qty: 30 TABLET | Refills: 1 | Status: SHIPPED | OUTPATIENT
Start: 2020-01-29 | End: 2020-02-21

## 2020-01-29 RX ORDER — FLUTICASONE PROPIONATE 50 MCG
1 SPRAY, SUSPENSION (ML) NASAL DAILY
Qty: 16 G | Refills: 0 | Status: SHIPPED | OUTPATIENT
Start: 2020-01-29 | End: 2020-02-24

## 2020-02-11 ENCOUNTER — APPOINTMENT (OUTPATIENT)
Dept: LAB | Facility: CLINIC | Age: 55
End: 2020-02-11
Payer: COMMERCIAL

## 2020-02-11 DIAGNOSIS — Z79.899 ENCOUNTER FOR LONG-TERM (CURRENT) USE OF OTHER MEDICATIONS: Primary | ICD-10-CM

## 2020-02-11 LAB
25(OH)D3 SERPL-MCNC: 32.6 NG/ML (ref 30–100)
ALBUMIN SERPL BCP-MCNC: 3.1 G/DL (ref 3.5–5)
ALP SERPL-CCNC: 72 U/L (ref 46–116)
ALT SERPL W P-5'-P-CCNC: 36 U/L (ref 12–78)
ANION GAP SERPL CALCULATED.3IONS-SCNC: 6 MMOL/L (ref 4–13)
AST SERPL W P-5'-P-CCNC: 17 U/L (ref 5–45)
BASOPHILS # BLD AUTO: 0.05 THOUSANDS/ΜL (ref 0–0.1)
BASOPHILS NFR BLD AUTO: 1 % (ref 0–1)
BILIRUB SERPL-MCNC: 0.45 MG/DL (ref 0.2–1)
BUN SERPL-MCNC: 15 MG/DL (ref 5–25)
CALCIUM SERPL-MCNC: 8.5 MG/DL (ref 8.3–10.1)
CHLORIDE SERPL-SCNC: 107 MMOL/L (ref 100–108)
CHOLEST SERPL-MCNC: 201 MG/DL (ref 50–200)
CO2 SERPL-SCNC: 28 MMOL/L (ref 21–32)
CREAT SERPL-MCNC: 1.12 MG/DL (ref 0.6–1.3)
EOSINOPHIL # BLD AUTO: 0.26 THOUSAND/ΜL (ref 0–0.61)
EOSINOPHIL NFR BLD AUTO: 4 % (ref 0–6)
ERYTHROCYTE [DISTWIDTH] IN BLOOD BY AUTOMATED COUNT: 13.6 % (ref 11.6–15.1)
FOLATE SERPL-MCNC: >20 NG/ML (ref 3.1–17.5)
GFR SERPL CREATININE-BSD FRML MDRD: 74 ML/MIN/1.73SQ M
GLUCOSE P FAST SERPL-MCNC: 101 MG/DL (ref 65–99)
GLUCOSE P FAST SERPL-MCNC: 103 MG/DL (ref 65–99)
HCT VFR BLD AUTO: 45.8 % (ref 36.5–49.3)
HDLC SERPL-MCNC: 43 MG/DL
HGB BLD-MCNC: 14.5 G/DL (ref 12–17)
IMM GRANULOCYTES # BLD AUTO: 0.05 THOUSAND/UL (ref 0–0.2)
IMM GRANULOCYTES NFR BLD AUTO: 1 % (ref 0–2)
LDLC SERPL CALC-MCNC: 111 MG/DL (ref 0–100)
LYMPHOCYTES # BLD AUTO: 1.77 THOUSANDS/ΜL (ref 0.6–4.47)
LYMPHOCYTES NFR BLD AUTO: 28 % (ref 14–44)
MCH RBC QN AUTO: 27.9 PG (ref 26.8–34.3)
MCHC RBC AUTO-ENTMCNC: 31.7 G/DL (ref 31.4–37.4)
MCV RBC AUTO: 88 FL (ref 82–98)
MONOCYTES # BLD AUTO: 0.76 THOUSAND/ΜL (ref 0.17–1.22)
MONOCYTES NFR BLD AUTO: 12 % (ref 4–12)
NEUTROPHILS # BLD AUTO: 3.35 THOUSANDS/ΜL (ref 1.85–7.62)
NEUTS SEG NFR BLD AUTO: 54 % (ref 43–75)
NONHDLC SERPL-MCNC: 158 MG/DL
NRBC BLD AUTO-RTO: 0 /100 WBCS
PLATELET # BLD AUTO: 189 THOUSANDS/UL (ref 149–390)
PMV BLD AUTO: 10.4 FL (ref 8.9–12.7)
POTASSIUM SERPL-SCNC: 3.6 MMOL/L (ref 3.5–5.3)
PROT SERPL-MCNC: 7 G/DL (ref 6.4–8.2)
RBC # BLD AUTO: 5.19 MILLION/UL (ref 3.88–5.62)
SODIUM SERPL-SCNC: 141 MMOL/L (ref 136–145)
TRIGL SERPL-MCNC: 237 MG/DL
TSH SERPL DL<=0.05 MIU/L-ACNC: 2.69 UIU/ML (ref 0.36–3.74)
VIT B12 SERPL-MCNC: 435 PG/ML (ref 100–900)
WBC # BLD AUTO: 6.24 THOUSAND/UL (ref 4.31–10.16)

## 2020-02-11 PROCEDURE — 85025 COMPLETE CBC W/AUTO DIFF WBC: CPT

## 2020-02-11 PROCEDURE — 36415 COLL VENOUS BLD VENIPUNCTURE: CPT

## 2020-02-11 PROCEDURE — 82607 VITAMIN B-12: CPT

## 2020-02-11 PROCEDURE — 84443 ASSAY THYROID STIM HORMONE: CPT

## 2020-02-11 PROCEDURE — 80307 DRUG TEST PRSMV CHEM ANLYZR: CPT

## 2020-02-11 PROCEDURE — 82306 VITAMIN D 25 HYDROXY: CPT

## 2020-02-11 PROCEDURE — 82951 GLUCOSE TOLERANCE TEST (GTT): CPT

## 2020-02-11 PROCEDURE — 82746 ASSAY OF FOLIC ACID SERUM: CPT

## 2020-02-11 PROCEDURE — 80061 LIPID PANEL: CPT

## 2020-02-11 PROCEDURE — 80053 COMPREHEN METABOLIC PANEL: CPT

## 2020-02-20 DIAGNOSIS — Z23 NEED FOR PNEUMOCOCCAL VACCINATION: ICD-10-CM

## 2020-02-20 DIAGNOSIS — K21.9 GASTROESOPHAGEAL REFLUX DISEASE, ESOPHAGITIS PRESENCE NOT SPECIFIED: ICD-10-CM

## 2020-02-20 RX ORDER — ASPIRIN 81 MG/1
81 TABLET ORAL DAILY
Qty: 30 TABLET | Refills: 3 | Status: SHIPPED | OUTPATIENT
Start: 2020-02-20 | End: 2020-03-25 | Stop reason: SDUPTHER

## 2020-02-20 RX ORDER — OMEPRAZOLE 40 MG/1
40 CAPSULE, DELAYED RELEASE ORAL DAILY
Qty: 30 CAPSULE | Refills: 0 | Status: SHIPPED | OUTPATIENT
Start: 2020-02-20 | End: 2020-03-22

## 2020-02-20 NOTE — TELEPHONE ENCOUNTER
Name of medication, dose, quantity and frequency  Requested Prescriptions     Pending Prescriptions Disp Refills    omeprazole (PriLOSEC) 40 MG capsule 90 capsule 3     Sig: Take 1 capsule (40 mg total) by mouth daily    aspirin (ECOTRIN LOW STRENGTH) 81 mg EC tablet 30 tablet 3     Sig: Take 1 tablet (81 mg total) by mouth daily       Number of refills left: 0    Amount of medication left:0    Pharmacy verified and updated    Additional information:  Called pharmacy to verify patient is out of refills   Pharmacist confirmed no refills remaining

## 2020-02-20 NOTE — TELEPHONE ENCOUNTER
Refill for aspirin and omeprazole sent to pharmacy  Will send refill for omeprazole with 30 day supply  Will discuss potential deprescription at next appointment

## 2020-02-21 DIAGNOSIS — Z23 NEED FOR PNEUMOCOCCAL VACCINATION: ICD-10-CM

## 2020-02-21 RX ORDER — TRAZODONE HYDROCHLORIDE 100 MG/1
100 TABLET ORAL
Qty: 30 TABLET | Refills: 2 | Status: SHIPPED | OUTPATIENT
Start: 2020-02-21 | End: 2020-05-27

## 2020-02-24 DIAGNOSIS — J30.9 ALLERGIC RHINITIS: ICD-10-CM

## 2020-02-24 RX ORDER — FLUTICASONE PROPIONATE 50 MCG
1 SPRAY, SUSPENSION (ML) NASAL DAILY
Qty: 16 G | Refills: 2 | Status: SHIPPED | OUTPATIENT
Start: 2020-02-24 | End: 2020-05-05

## 2020-02-28 DIAGNOSIS — E78.1 HYPERTRIGLYCERIDEMIA: ICD-10-CM

## 2020-02-28 RX ORDER — GEMFIBROZIL 600 MG/1
600 TABLET, FILM COATED ORAL
Qty: 60 TABLET | Refills: 2 | Status: SHIPPED | OUTPATIENT
Start: 2020-02-28 | End: 2020-03-25 | Stop reason: ALTCHOICE

## 2020-03-20 DIAGNOSIS — E03.9 HYPOTHYROIDISM: ICD-10-CM

## 2020-03-21 DIAGNOSIS — K21.9 GASTROESOPHAGEAL REFLUX DISEASE, ESOPHAGITIS PRESENCE NOT SPECIFIED: ICD-10-CM

## 2020-03-22 RX ORDER — OMEPRAZOLE 40 MG/1
40 CAPSULE, DELAYED RELEASE ORAL DAILY
Qty: 30 CAPSULE | Refills: 0 | Status: SHIPPED | OUTPATIENT
Start: 2020-03-22 | End: 2020-03-25 | Stop reason: SDUPTHER

## 2020-03-25 ENCOUNTER — TELEMEDICINE (OUTPATIENT)
Dept: INTERNAL MEDICINE CLINIC | Facility: CLINIC | Age: 55
End: 2020-03-25

## 2020-03-25 DIAGNOSIS — K21.9 GASTROESOPHAGEAL REFLUX DISEASE, ESOPHAGITIS PRESENCE NOT SPECIFIED: ICD-10-CM

## 2020-03-25 DIAGNOSIS — E78.1 HYPERTRIGLYCERIDEMIA: ICD-10-CM

## 2020-03-25 DIAGNOSIS — K21.9 GASTROESOPHAGEAL REFLUX DISEASE WITHOUT ESOPHAGITIS: ICD-10-CM

## 2020-03-25 DIAGNOSIS — Z23 NEED FOR PNEUMOCOCCAL VACCINATION: ICD-10-CM

## 2020-03-25 DIAGNOSIS — R14.0 BLOATING: ICD-10-CM

## 2020-03-25 DIAGNOSIS — E03.9 HYPOTHYROIDISM, UNSPECIFIED TYPE: Primary | ICD-10-CM

## 2020-03-25 PROCEDURE — T1015 CLINIC SERVICE: HCPCS | Performed by: INTERNAL MEDICINE

## 2020-03-25 PROCEDURE — G2012 BRIEF CHECK IN BY MD/QHP: HCPCS | Performed by: INTERNAL MEDICINE

## 2020-03-25 RX ORDER — ACETAMINOPHEN 325 MG/1
650 TABLET ORAL EVERY 6 HOURS PRN
Qty: 30 TABLET | Refills: 1 | Status: SHIPPED | OUTPATIENT
Start: 2020-03-25 | End: 2020-09-01 | Stop reason: SDUPTHER

## 2020-03-25 RX ORDER — SIMETHICONE 125 MG
125 CAPSULE ORAL EVERY 8 HOURS PRN
Qty: 90 CAPSULE | Refills: 0 | Status: SHIPPED | OUTPATIENT
Start: 2020-03-25 | End: 2020-12-21 | Stop reason: SDUPTHER

## 2020-03-25 RX ORDER — OMEPRAZOLE 40 MG/1
40 CAPSULE, DELAYED RELEASE ORAL DAILY
Qty: 30 CAPSULE | Refills: 1 | Status: SHIPPED | OUTPATIENT
Start: 2020-03-25 | End: 2020-06-02

## 2020-03-25 RX ORDER — ASPIRIN 81 MG/1
81 TABLET ORAL DAILY
Qty: 30 TABLET | Refills: 3 | Status: SHIPPED | OUTPATIENT
Start: 2020-03-25 | End: 2020-11-10

## 2020-03-25 NOTE — ASSESSMENT & PLAN NOTE
Elevated triglycerides, LDL, and total cholesterol on last lipid panel  ASCVD risk is 5 1%  No history of diabetes  Patient has additional risk factor of elevated triglyceride level greater than 150  Borderline candidate for initiation of statin therapy    · Discontinue gemfibrozil as patient reports that he is not currently taking any way  · Will hold off on initiating statin therapy at this time given borderline indications  · Plan to recheck lipid panel and reassess appropriateness for statin therapy based on lipid trends

## 2020-03-25 NOTE — ASSESSMENT & PLAN NOTE
Stable and symptoms well controlled    · Will sent refill for omeprazole the pharmacy per patient request  · Will also send refill for simethicone per patient request

## 2020-03-25 NOTE — PATIENT INSTRUCTIONS
1   Stop gemfibrozil  2  We will hold off on starting any additional cholesterol medication for now  Plan on checking blood work at next visit to recheck cholesterol levels    3   Follow-up in 8 weeks

## 2020-03-25 NOTE — PROGRESS NOTES
Virtual Regular Visit    Problem List Items Addressed This Visit        Digestive    Gastroesophageal reflux disease without esophagitis     Stable and symptoms well controlled  · Will sent refill for omeprazole the pharmacy per patient request  · Will also send refill for simethicone per patient request         Relevant Medications    omeprazole (PriLOSEC) 40 MG capsule       Endocrine    Hypothyroidism - Primary     Recent TSH within normal limits  · Continue levothyroxine 125 mcg daily            Other    Hypertriglyceridemia     Elevated triglycerides, LDL, and total cholesterol on last lipid panel  ASCVD risk is 5 1%  No history of diabetes  Patient has additional risk factor of elevated triglyceride level greater than 150  Borderline candidate for initiation of statin therapy  · Discontinue gemfibrozil as patient reports that he is not currently taking any way  · Will hold off on initiating statin therapy at this time given borderline indications  · Plan to recheck lipid panel and reassess appropriateness for statin therapy based on lipid trends           Other Visit Diagnoses     Need for pneumococcal vaccination        Relevant Medications    acetaminophen (TYLENOL) 325 mg tablet    aspirin (ECOTRIN LOW STRENGTH) 81 mg EC tablet    Gastroesophageal reflux disease, esophagitis presence not specified        Relevant Medications    omeprazole (PriLOSEC) 40 MG capsule    Bloating        Relevant Medications    simethicone (MYLICON,GAS-X) 555 MG CAPS               Reason for visit is follow-up of chronic conditions including GERD, hypertriglyceridemia, and hypothyroidism  Encounter provider Nadine Lopez DO    Provider located at [place] [id number] [phone]      Recent Visits  No visits were found meeting these conditions     Showing recent visits within past 7 days and meeting all other requirements     Future Appointments  No visits were found meeting these conditions  Showing future appointments within next 150 days and meeting all other requirements        After connecting through [employer], the patient was identified by name and date of birth  Monie Sun was informed that this is a telemedicine visit and that the visit is being conducted through telephone which may not be secure and therefore, might not be HIPAA-compliant  My office door was closed  The following individuals were in the room with me and the patient informed   He acknowledged consent and understanding of privacy and security of the video platform  The patient has agreed to participate and understands they can discontinue the visit at any time  Patient reports that he has a flip-phone and does not have video calling capability  Will proceed with telephone virtual visits at this time  Subjective  Monie Sun is a 53 y o  male with history of GERD, hypertriglyceridemia, hypothyroidism, and osteoarthritis  Patient reports that he is doing okay and offers no acute complaints  Reports compliance with medications although he admits that he is not currently taking gemfibrozil  Patient is currently self quarantine at home and denies any infectious symptoms  He is requesting refills for certain medications including his Tylenol, aspirin, omeprazole, and gas medicine  Reports no other issues        Past Medical History:   Diagnosis Date    Anxiety     [facility]     [facility]     Depression     GERD (gastroesophageal reflux disease)     Glaucoma     Hiatal hernia     Hyperlipidemia     Hyperthyroidism     Thyroid disease        Past Surgical History:   Procedure Laterality Date    CHOLECYSTECTOMY      CHOLECYSTECTOMY LAPAROSCOPIC      last assessed: 4/23/15    COLONOSCOPY      EYE SURGERY      OR COLONOSCOPY FLX DX W/COLLJ SPEC WHEN PFRMD N/A 4/4/2019    Procedure: COLONOSCOPY;  Surgeon: Kamran Atkinson MD;  Location:  GI LAB; Service: Gastroenterology    NC ESOPHAGOGASTRODUODENOSCOPY TRANSORAL DIAGNOSTIC N/A 4/4/2019    Procedure: ESOPHAGOGASTRODUODENOSCOPY (EGD); Surgeon: Kamran Atkinson MD;  Location:  GI LAB; Service: Gastroenterology    UPPER GASTROINTESTINAL ENDOSCOPY         Current Outpatient Medications   Medication Sig Dispense Refill    acetaminophen (TYLENOL) 325 mg tablet Take 2 tablets (650 mg total) by mouth every 6 (six) hours as needed for mild pain 30 tablet 1    aspirin (ECOTRIN LOW STRENGTH) 81 mg EC tablet Take 1 tablet (81 mg total) by mouth daily 30 tablet 3    brimonidine (ALPHAGAN P) 0 15 % ophthalmic solution Apply 1 drop to eye 2 (two) times a day      Cholecalciferol (VITAMIN D3) 2000 units capsule Take 1 capsule (2,000 Units total) by mouth daily 30 capsule 3    diclofenac sodium (VOLTAREN) 1 % Apply 2 g topically 4 (four) times a day 1 Tube 0    fluticasone (FLONASE) 50 mcg/act nasal spray 1 SPRAY INTO EACH NOSTRIL DAILY 16 g 2    latanoprost (XALATAN) 0 005 % ophthalmic solution Apply 1 drop to eye daily      levothyroxine 125 mcg tablet Take 1 tablet (125 mcg total) by mouth daily 90 tablet 3    loratadine (CLARITIN) 10 mg tablet Take 1 tablet (10 mg total) by mouth daily (Patient not taking: Reported on 11/4/2019) 30 tablet 0    LORazepam (ATIVAN) 1 mg tablet Take 1 mg by mouth daily      omeprazole (PriLOSEC) 40 MG capsule Take 1 capsule (40 mg total) by mouth daily 30 capsule 1    polyethylene glycol (GLYCOLAX) powder TAKE 17 G BY MOUTH DAILY WITH 8 OZ OF WATER 255 g 0    QUEtiapine (SEROQUEL) 100 mg tablet Take 100 mg by mouth daily at bedtime        simethicone (MYLICON,GAS-X) 015 MG CAPS Take 1 capsule (125 mg total) by mouth every 8 (eight) hours as needed for flatulence (stomach gas) 90 capsule 0    timolol (TIMOPTIC) 0 5 % ophthalmic solution Apply 1 drop to eye every morning 10 mL 3    traZODone (DESYREL) 100 mg tablet TAKE 1 TABLET (100 MG TOTAL) BY MOUTH DAILY AT BEDTIME 30 tablet 2    vitamin B-12 (CYANOCOBALAMIN) 250 MCG tablet Take 1 tablet (250 mcg total) by mouth daily 90 tablet 3     No current facility-administered medications for this visit  No Known Allergies    Review of Systems   Gastrointestinal:        Bloating   Musculoskeletal: Positive for arthralgias  All other systems reviewed and are negative  I spent 13 minutes with the patient during this visit

## 2020-04-08 DIAGNOSIS — E03.9 HYPOTHYROIDISM: ICD-10-CM

## 2020-04-08 RX ORDER — LEVOTHYROXINE SODIUM 0.12 MG/1
125 TABLET ORAL DAILY
Qty: 90 TABLET | Refills: 3 | OUTPATIENT
Start: 2020-04-08

## 2020-04-08 RX ORDER — LEVOTHYROXINE SODIUM 0.12 MG/1
125 TABLET ORAL DAILY
Qty: 90 TABLET | Refills: 1 | Status: SHIPPED | OUTPATIENT
Start: 2020-04-08 | End: 2020-07-30 | Stop reason: SDUPTHER

## 2020-05-05 DIAGNOSIS — J30.9 ALLERGIC RHINITIS: ICD-10-CM

## 2020-05-05 RX ORDER — FLUTICASONE PROPIONATE 50 MCG
1 SPRAY, SUSPENSION (ML) NASAL DAILY
Qty: 16 G | Refills: 2 | Status: SHIPPED | OUTPATIENT
Start: 2020-05-05 | End: 2020-06-23

## 2020-05-11 DIAGNOSIS — E78.1 HYPERTRIGLYCERIDEMIA: ICD-10-CM

## 2020-05-11 RX ORDER — GEMFIBROZIL 600 MG/1
600 TABLET, FILM COATED ORAL
Qty: 60 TABLET | Refills: 2 | OUTPATIENT
Start: 2020-05-11

## 2020-05-25 DIAGNOSIS — Z23 NEED FOR PNEUMOCOCCAL VACCINATION: ICD-10-CM

## 2020-05-27 RX ORDER — TRAZODONE HYDROCHLORIDE 100 MG/1
100 TABLET ORAL
Qty: 30 TABLET | Refills: 2 | Status: SHIPPED | OUTPATIENT
Start: 2020-05-27 | End: 2020-08-12 | Stop reason: SDUPTHER

## 2020-06-02 DIAGNOSIS — K21.9 GASTROESOPHAGEAL REFLUX DISEASE, ESOPHAGITIS PRESENCE NOT SPECIFIED: ICD-10-CM

## 2020-06-02 RX ORDER — OMEPRAZOLE 40 MG/1
40 CAPSULE, DELAYED RELEASE ORAL DAILY
Qty: 30 CAPSULE | Refills: 1 | Status: SHIPPED | OUTPATIENT
Start: 2020-06-02 | End: 2020-07-30 | Stop reason: SDUPTHER

## 2020-06-20 DIAGNOSIS — J30.9 ALLERGIC RHINITIS: ICD-10-CM

## 2020-06-23 RX ORDER — FLUTICASONE PROPIONATE 50 MCG
1 SPRAY, SUSPENSION (ML) NASAL DAILY
Qty: 16 G | Refills: 2 | Status: SHIPPED | OUTPATIENT
Start: 2020-06-23 | End: 2020-08-31 | Stop reason: SDUPTHER

## 2020-07-10 DIAGNOSIS — E03.9 HYPOTHYROIDISM: ICD-10-CM

## 2020-07-13 RX ORDER — LEVOTHYROXINE SODIUM 0.12 MG/1
125 TABLET ORAL DAILY
Qty: 90 TABLET | Refills: 1 | OUTPATIENT
Start: 2020-07-13

## 2020-07-20 DIAGNOSIS — K21.9 GASTROESOPHAGEAL REFLUX DISEASE, ESOPHAGITIS PRESENCE NOT SPECIFIED: ICD-10-CM

## 2020-07-20 RX ORDER — OMEPRAZOLE 40 MG/1
40 CAPSULE, DELAYED RELEASE ORAL DAILY
Qty: 30 CAPSULE | Refills: 1 | OUTPATIENT
Start: 2020-07-20

## 2020-07-30 DIAGNOSIS — E03.9 HYPOTHYROIDISM: ICD-10-CM

## 2020-07-30 DIAGNOSIS — K21.9 GASTROESOPHAGEAL REFLUX DISEASE, ESOPHAGITIS PRESENCE NOT SPECIFIED: ICD-10-CM

## 2020-07-30 RX ORDER — OMEPRAZOLE 40 MG/1
40 CAPSULE, DELAYED RELEASE ORAL DAILY
Qty: 30 CAPSULE | Refills: 1 | Status: SHIPPED | OUTPATIENT
Start: 2020-07-30 | End: 2020-09-02

## 2020-07-30 RX ORDER — LEVOTHYROXINE SODIUM 0.12 MG/1
125 TABLET ORAL DAILY
Qty: 90 TABLET | Refills: 1 | Status: SHIPPED | OUTPATIENT
Start: 2020-07-30 | End: 2020-11-23

## 2020-08-07 DIAGNOSIS — Z23 NEED FOR PNEUMOCOCCAL VACCINATION: ICD-10-CM

## 2020-08-12 DIAGNOSIS — Z23 NEED FOR PNEUMOCOCCAL VACCINATION: ICD-10-CM

## 2020-08-12 RX ORDER — TRAZODONE HYDROCHLORIDE 100 MG/1
100 TABLET ORAL
Qty: 30 TABLET | Refills: 2 | OUTPATIENT
Start: 2020-08-12

## 2020-08-12 RX ORDER — TRAZODONE HYDROCHLORIDE 100 MG/1
100 TABLET ORAL
Qty: 30 TABLET | Refills: 2 | Status: SHIPPED | OUTPATIENT
Start: 2020-08-12 | End: 2020-10-30

## 2020-08-27 DIAGNOSIS — J30.9 ALLERGIC RHINITIS: ICD-10-CM

## 2020-08-27 RX ORDER — FLUTICASONE PROPIONATE 50 MCG
1 SPRAY, SUSPENSION (ML) NASAL DAILY
Qty: 16 G | Refills: 2 | OUTPATIENT
Start: 2020-08-27

## 2020-08-31 DIAGNOSIS — J30.9 ALLERGIC RHINITIS: ICD-10-CM

## 2020-08-31 RX ORDER — FLUTICASONE PROPIONATE 50 MCG
1 SPRAY, SUSPENSION (ML) NASAL DAILY
Qty: 16 G | Refills: 0 | Status: SHIPPED | OUTPATIENT
Start: 2020-08-31 | End: 2020-10-15

## 2020-09-01 DIAGNOSIS — K59.00 CONSTIPATION: ICD-10-CM

## 2020-09-01 DIAGNOSIS — Z23 NEED FOR PNEUMOCOCCAL VACCINATION: ICD-10-CM

## 2020-09-01 DIAGNOSIS — K21.9 GASTROESOPHAGEAL REFLUX DISEASE, ESOPHAGITIS PRESENCE NOT SPECIFIED: ICD-10-CM

## 2020-09-01 NOTE — TELEPHONE ENCOUNTER
Name of medication, dose, quantity and frequency  Requested Prescriptions     Pending Prescriptions Disp Refills    omeprazole (PriLOSEC) 40 MG capsule [Pharmacy Med Name: OMEPRAZOLE 40MG CAPSULE] 30 capsule 1     Sig: TAKE 1 CAPSULE (40 MG TOTAL) BY MOUTH DAILY    polyethylene glycol (GLYCOLAX) 17 GM/SCOOP powder 255 g 0    acetaminophen (TYLENOL) 325 mg tablet 30 tablet 1     Sig: Take 2 tablets (650 mg total) by mouth every 6 (six) hours as needed for mild pain       Number of refills left:    Amount of medication left:    Pharmacy verified and updated    Additional information:

## 2020-09-02 DIAGNOSIS — K21.9 GASTROESOPHAGEAL REFLUX DISEASE, ESOPHAGITIS PRESENCE NOT SPECIFIED: ICD-10-CM

## 2020-09-02 DIAGNOSIS — K59.00 CONSTIPATION: ICD-10-CM

## 2020-09-02 DIAGNOSIS — Z23 NEED FOR PNEUMOCOCCAL VACCINATION: ICD-10-CM

## 2020-09-02 RX ORDER — POLYETHYLENE GLYCOL 3350 17 G/17G
17 POWDER, FOR SOLUTION ORAL DAILY
Qty: 510 G | Refills: 2 | Status: SHIPPED | OUTPATIENT
Start: 2020-09-02 | End: 2020-09-02 | Stop reason: SDUPTHER

## 2020-09-02 RX ORDER — OMEPRAZOLE 40 MG/1
40 CAPSULE, DELAYED RELEASE ORAL DAILY
Qty: 30 CAPSULE | Refills: 1 | Status: SHIPPED | OUTPATIENT
Start: 2020-09-02 | End: 2020-11-02

## 2020-09-02 RX ORDER — POLYETHYLENE GLYCOL 3350 17 G/17G
17 POWDER, FOR SOLUTION ORAL DAILY
Qty: 510 G | Refills: 2 | Status: SHIPPED | OUTPATIENT
Start: 2020-09-02 | End: 2020-11-23

## 2020-09-02 RX ORDER — OMEPRAZOLE 40 MG/1
40 CAPSULE, DELAYED RELEASE ORAL DAILY
Qty: 30 CAPSULE | Refills: 1 | Status: SHIPPED | OUTPATIENT
Start: 2020-09-02 | End: 2020-09-02 | Stop reason: SDUPTHER

## 2020-09-02 RX ORDER — ACETAMINOPHEN 325 MG/1
650 TABLET ORAL EVERY 6 HOURS PRN
Qty: 30 TABLET | Refills: 1 | Status: SHIPPED | OUTPATIENT
Start: 2020-09-02 | End: 2020-09-02 | Stop reason: SDUPTHER

## 2020-09-02 RX ORDER — ACETAMINOPHEN 325 MG/1
650 TABLET ORAL EVERY 6 HOURS PRN
Qty: 30 TABLET | Refills: 0 | Status: SHIPPED | OUTPATIENT
Start: 2020-09-02 | End: 2020-09-03

## 2020-09-03 DIAGNOSIS — Z23 NEED FOR PNEUMOCOCCAL VACCINATION: ICD-10-CM

## 2020-09-03 RX ORDER — ACETAMINOPHEN 325 MG/1
650 TABLET ORAL EVERY 6 HOURS PRN
Qty: 30 TABLET | Refills: 0 | Status: SHIPPED | OUTPATIENT
Start: 2020-09-03 | End: 2020-09-10

## 2020-09-10 DIAGNOSIS — Z23 NEED FOR PNEUMOCOCCAL VACCINATION: ICD-10-CM

## 2020-09-10 RX ORDER — ACETAMINOPHEN 325 MG/1
650 TABLET ORAL EVERY 6 HOURS PRN
Qty: 30 TABLET | Refills: 0 | Status: SHIPPED | OUTPATIENT
Start: 2020-09-10 | End: 2020-11-10

## 2020-10-05 DIAGNOSIS — Z23 NEED FOR PNEUMOCOCCAL VACCINATION: ICD-10-CM

## 2020-10-05 RX ORDER — ASPIRIN 81 MG/1
81 TABLET ORAL DAILY
Qty: 30 TABLET | Refills: 3 | OUTPATIENT
Start: 2020-10-05

## 2020-10-06 ENCOUNTER — IMMUNIZATIONS (OUTPATIENT)
Dept: INTERNAL MEDICINE CLINIC | Facility: CLINIC | Age: 55
End: 2020-10-06

## 2020-10-06 DIAGNOSIS — Z23 NEED FOR INFLUENZA VACCINATION: Primary | ICD-10-CM

## 2020-10-06 PROCEDURE — 90682 RIV4 VACC RECOMBINANT DNA IM: CPT | Performed by: INTERNAL MEDICINE

## 2020-10-06 PROCEDURE — G0008 ADMIN INFLUENZA VIRUS VAC: HCPCS | Performed by: INTERNAL MEDICINE

## 2020-10-15 DIAGNOSIS — J30.9 ALLERGIC RHINITIS: ICD-10-CM

## 2020-10-15 RX ORDER — FLUTICASONE PROPIONATE 50 MCG
1 SPRAY, SUSPENSION (ML) NASAL DAILY
Qty: 16 G | Refills: 0 | Status: SHIPPED | OUTPATIENT
Start: 2020-10-15 | End: 2020-11-27

## 2020-10-23 DIAGNOSIS — Z23 NEED FOR PNEUMOCOCCAL VACCINATION: ICD-10-CM

## 2020-10-30 DIAGNOSIS — K21.9 GASTROESOPHAGEAL REFLUX DISEASE: ICD-10-CM

## 2020-10-30 DIAGNOSIS — Z23 NEED FOR PNEUMOCOCCAL VACCINATION: ICD-10-CM

## 2020-10-30 RX ORDER — ASPIRIN 81 MG/1
81 TABLET ORAL DAILY
Qty: 30 TABLET | Refills: 3 | OUTPATIENT
Start: 2020-10-30

## 2020-10-30 RX ORDER — TRAZODONE HYDROCHLORIDE 100 MG/1
100 TABLET ORAL
Qty: 30 TABLET | Refills: 2 | Status: SHIPPED | OUTPATIENT
Start: 2020-10-30 | End: 2020-11-23

## 2020-11-02 RX ORDER — OMEPRAZOLE 40 MG/1
40 CAPSULE, DELAYED RELEASE ORAL DAILY
Qty: 30 CAPSULE | Refills: 1 | Status: SHIPPED | OUTPATIENT
Start: 2020-11-02 | End: 2020-11-23

## 2020-11-07 DIAGNOSIS — Z23 NEED FOR PNEUMOCOCCAL VACCINATION: ICD-10-CM

## 2020-11-09 DIAGNOSIS — J30.9 ALLERGIC RHINITIS: ICD-10-CM

## 2020-11-09 RX ORDER — ASPIRIN 81 MG/1
81 TABLET ORAL DAILY
Qty: 30 TABLET | Refills: 3 | OUTPATIENT
Start: 2020-11-09

## 2020-11-10 DIAGNOSIS — E03.9 HYPOTHYROIDISM: ICD-10-CM

## 2020-11-10 DIAGNOSIS — E78.1 HYPERTRIGLYCERIDEMIA: ICD-10-CM

## 2020-11-10 DIAGNOSIS — Z23 NEED FOR PNEUMOCOCCAL VACCINATION: ICD-10-CM

## 2020-11-10 DIAGNOSIS — K59.00 CONSTIPATION: ICD-10-CM

## 2020-11-10 DIAGNOSIS — K21.9 GASTROESOPHAGEAL REFLUX DISEASE: ICD-10-CM

## 2020-11-10 DIAGNOSIS — E53.8 LOW VITAMIN B12 LEVEL: ICD-10-CM

## 2020-11-10 RX ORDER — ASPIRIN 81 MG/1
81 TABLET ORAL DAILY
Qty: 30 TABLET | Refills: 3 | Status: SHIPPED | OUTPATIENT
Start: 2020-11-10 | End: 2020-11-11 | Stop reason: SDUPTHER

## 2020-11-10 RX ORDER — ACETAMINOPHEN 325 MG/1
650 TABLET ORAL EVERY 6 HOURS PRN
Qty: 30 TABLET | Refills: 0 | Status: SHIPPED | OUTPATIENT
Start: 2020-11-10 | End: 2020-12-21 | Stop reason: SDUPTHER

## 2020-11-10 RX ORDER — ACETAMINOPHEN 160 MG
2000 TABLET,DISINTEGRATING ORAL DAILY
Qty: 30 CAPSULE | Refills: 3 | Status: SHIPPED | OUTPATIENT
Start: 2020-11-10 | End: 2020-12-21 | Stop reason: SDUPTHER

## 2020-11-10 RX ORDER — ASPIRIN 81 MG/1
81 TABLET ORAL DAILY
Qty: 30 TABLET | Refills: 3 | OUTPATIENT
Start: 2020-11-10

## 2020-11-10 RX ORDER — GEMFIBROZIL 600 MG/1
600 TABLET, FILM COATED ORAL
Qty: 60 TABLET | Refills: 2 | OUTPATIENT
Start: 2020-11-10

## 2020-11-11 DIAGNOSIS — Z23 NEED FOR PNEUMOCOCCAL VACCINATION: ICD-10-CM

## 2020-11-11 RX ORDER — ASPIRIN 81 MG/1
81 TABLET ORAL DAILY
Qty: 30 TABLET | Refills: 2 | Status: SHIPPED | OUTPATIENT
Start: 2020-11-11 | End: 2020-12-21 | Stop reason: SDUPTHER

## 2020-11-23 RX ORDER — OMEPRAZOLE 40 MG/1
40 CAPSULE, DELAYED RELEASE ORAL DAILY
Qty: 30 CAPSULE | Refills: 1 | Status: SHIPPED | OUTPATIENT
Start: 2020-11-23 | End: 2020-12-21 | Stop reason: SDUPTHER

## 2020-11-23 RX ORDER — TRAZODONE HYDROCHLORIDE 100 MG/1
100 TABLET ORAL
Qty: 30 TABLET | Refills: 2 | Status: SHIPPED | OUTPATIENT
Start: 2020-11-23 | End: 2021-04-15

## 2020-11-23 RX ORDER — LEVOTHYROXINE SODIUM 0.12 MG/1
125 TABLET ORAL DAILY
Qty: 90 TABLET | Refills: 1 | Status: SHIPPED | OUTPATIENT
Start: 2020-11-23 | End: 2020-12-21 | Stop reason: SDUPTHER

## 2020-11-23 RX ORDER — POLYETHYLENE GLYCOL 3350 17 G/17G
17 POWDER, FOR SOLUTION ORAL DAILY
Qty: 510 G | Refills: 2 | Status: SHIPPED | OUTPATIENT
Start: 2020-11-23 | End: 2020-12-21 | Stop reason: SDUPTHER

## 2020-11-27 RX ORDER — FLUTICASONE PROPIONATE 50 MCG
1 SPRAY, SUSPENSION (ML) NASAL DAILY
Qty: 16 G | Refills: 0 | Status: SHIPPED | OUTPATIENT
Start: 2020-11-27 | End: 2020-12-21 | Stop reason: SDUPTHER

## 2020-12-21 ENCOUNTER — OFFICE VISIT (OUTPATIENT)
Dept: INTERNAL MEDICINE CLINIC | Facility: CLINIC | Age: 55
End: 2020-12-21

## 2020-12-21 VITALS
TEMPERATURE: 96.7 F | DIASTOLIC BLOOD PRESSURE: 64 MMHG | HEART RATE: 93 BPM | WEIGHT: 185.6 LBS | SYSTOLIC BLOOD PRESSURE: 118 MMHG | BODY MASS INDEX: 27.49 KG/M2 | HEIGHT: 69 IN

## 2020-12-21 DIAGNOSIS — R05.9 COUGH: ICD-10-CM

## 2020-12-21 DIAGNOSIS — R14.0 BLOATING: ICD-10-CM

## 2020-12-21 DIAGNOSIS — K59.00 CONSTIPATION: ICD-10-CM

## 2020-12-21 DIAGNOSIS — E03.9 HYPOTHYROIDISM: ICD-10-CM

## 2020-12-21 DIAGNOSIS — E53.8 LOW VITAMIN B12 LEVEL: ICD-10-CM

## 2020-12-21 DIAGNOSIS — J30.9 ALLERGIC RHINITIS: ICD-10-CM

## 2020-12-21 DIAGNOSIS — K21.9 GASTROESOPHAGEAL REFLUX DISEASE: ICD-10-CM

## 2020-12-21 DIAGNOSIS — E78.1 HYPERTRIGLYCERIDEMIA: Primary | ICD-10-CM

## 2020-12-21 DIAGNOSIS — Z23 NEED FOR PNEUMOCOCCAL VACCINATION: ICD-10-CM

## 2020-12-21 PROCEDURE — 1036F TOBACCO NON-USER: CPT | Performed by: INTERNAL MEDICINE

## 2020-12-21 PROCEDURE — 99213 OFFICE O/P EST LOW 20 MIN: CPT | Performed by: INTERNAL MEDICINE

## 2020-12-21 PROCEDURE — 3008F BODY MASS INDEX DOCD: CPT | Performed by: INTERNAL MEDICINE

## 2020-12-21 RX ORDER — LORATADINE 10 MG/1
10 TABLET ORAL DAILY
Qty: 30 TABLET | Refills: 0 | Status: SHIPPED | OUTPATIENT
Start: 2020-12-21 | End: 2021-01-15

## 2020-12-21 RX ORDER — SIMETHICONE 125 MG
125 CAPSULE ORAL EVERY 8 HOURS PRN
Qty: 90 CAPSULE | Refills: 0 | Status: SHIPPED | OUTPATIENT
Start: 2020-12-21 | End: 2021-12-10 | Stop reason: SDUPTHER

## 2020-12-21 RX ORDER — LEVOTHYROXINE SODIUM 0.12 MG/1
125 TABLET ORAL DAILY
Qty: 90 TABLET | Refills: 1 | Status: SHIPPED | OUTPATIENT
Start: 2020-12-21 | End: 2021-04-09 | Stop reason: SDUPTHER

## 2020-12-21 RX ORDER — OMEPRAZOLE 40 MG/1
40 CAPSULE, DELAYED RELEASE ORAL DAILY
Qty: 30 CAPSULE | Refills: 1 | Status: SHIPPED | OUTPATIENT
Start: 2020-12-21 | End: 2021-04-09 | Stop reason: SDUPTHER

## 2020-12-21 RX ORDER — POLYETHYLENE GLYCOL 3350 17 G/17G
17 POWDER, FOR SOLUTION ORAL DAILY
Qty: 510 G | Refills: 2 | Status: SHIPPED | OUTPATIENT
Start: 2020-12-21 | End: 2021-12-10 | Stop reason: SDUPTHER

## 2020-12-21 RX ORDER — FLUTICASONE PROPIONATE 50 MCG
1 SPRAY, SUSPENSION (ML) NASAL DAILY
Qty: 16 G | Refills: 0 | Status: SHIPPED | OUTPATIENT
Start: 2020-12-21 | End: 2021-01-15

## 2020-12-21 RX ORDER — ACETAMINOPHEN 325 MG/1
650 TABLET ORAL EVERY 6 HOURS PRN
Qty: 30 TABLET | Refills: 0 | Status: SHIPPED | OUTPATIENT
Start: 2020-12-21 | End: 2021-04-09 | Stop reason: SDUPTHER

## 2020-12-21 RX ORDER — ACETAMINOPHEN 160 MG
2000 TABLET,DISINTEGRATING ORAL DAILY
Qty: 30 CAPSULE | Refills: 3 | Status: SHIPPED | OUTPATIENT
Start: 2020-12-21 | End: 2021-03-25 | Stop reason: SDUPTHER

## 2020-12-21 RX ORDER — ASPIRIN 81 MG/1
81 TABLET ORAL DAILY
Qty: 30 TABLET | Refills: 2 | Status: SHIPPED | OUTPATIENT
Start: 2020-12-21 | End: 2021-04-09 | Stop reason: SDUPTHER

## 2021-01-15 DIAGNOSIS — J30.9 ALLERGIC RHINITIS: ICD-10-CM

## 2021-01-15 DIAGNOSIS — R05.9 COUGH: ICD-10-CM

## 2021-01-15 RX ORDER — FLUTICASONE PROPIONATE 50 MCG
1 SPRAY, SUSPENSION (ML) NASAL DAILY
Qty: 16 G | Refills: 0 | Status: SHIPPED | OUTPATIENT
Start: 2021-01-15 | End: 2021-03-24

## 2021-01-15 RX ORDER — LORATADINE 10 MG/1
10 TABLET ORAL DAILY
Qty: 30 TABLET | Refills: 0 | Status: SHIPPED | OUTPATIENT
Start: 2021-01-15 | End: 2021-02-21

## 2021-01-18 ENCOUNTER — TELEPHONE (OUTPATIENT)
Dept: INTERNAL MEDICINE CLINIC | Facility: CLINIC | Age: 56
End: 2021-01-18

## 2021-01-18 DIAGNOSIS — M25.561 CHRONIC PAIN OF BOTH KNEES: Primary | ICD-10-CM

## 2021-01-18 DIAGNOSIS — M25.562 CHRONIC PAIN OF BOTH KNEES: Primary | ICD-10-CM

## 2021-01-18 DIAGNOSIS — G89.29 CHRONIC PAIN OF BOTH KNEES: Primary | ICD-10-CM

## 2021-01-31 DIAGNOSIS — G89.29 CHRONIC PAIN OF RIGHT KNEE: ICD-10-CM

## 2021-01-31 DIAGNOSIS — M25.561 CHRONIC PAIN OF RIGHT KNEE: ICD-10-CM

## 2021-01-31 DIAGNOSIS — M25.562 CHRONIC PAIN OF LEFT KNEE: Primary | ICD-10-CM

## 2021-01-31 DIAGNOSIS — G89.29 CHRONIC PAIN OF LEFT KNEE: Primary | ICD-10-CM

## 2021-02-15 DIAGNOSIS — R05.9 COUGH: ICD-10-CM

## 2021-02-21 RX ORDER — LORATADINE 10 MG/1
10 TABLET ORAL DAILY
Qty: 30 TABLET | Refills: 0 | Status: SHIPPED | OUTPATIENT
Start: 2021-02-21 | End: 2021-03-19

## 2021-03-19 DIAGNOSIS — R05.9 COUGH: ICD-10-CM

## 2021-03-19 RX ORDER — LORATADINE 10 MG/1
10 TABLET ORAL DAILY
Qty: 30 TABLET | Refills: 0 | Status: SHIPPED | OUTPATIENT
Start: 2021-03-19 | End: 2021-04-09 | Stop reason: SDUPTHER

## 2021-03-23 DIAGNOSIS — J30.9 ALLERGIC RHINITIS: ICD-10-CM

## 2021-03-23 DIAGNOSIS — Z23 NEED FOR PNEUMOCOCCAL VACCINATION: ICD-10-CM

## 2021-03-24 DIAGNOSIS — Z23 NEED FOR PNEUMOCOCCAL VACCINATION: ICD-10-CM

## 2021-03-24 RX ORDER — FLUTICASONE PROPIONATE 50 MCG
SPRAY, SUSPENSION (ML) NASAL
Qty: 16 G | Refills: 0 | Status: SHIPPED | OUTPATIENT
Start: 2021-03-24 | End: 2021-04-09 | Stop reason: SDUPTHER

## 2021-03-25 RX ORDER — ACETAMINOPHEN 160 MG
2000 TABLET,DISINTEGRATING ORAL DAILY
Qty: 30 CAPSULE | Refills: 3 | Status: SHIPPED | OUTPATIENT
Start: 2021-03-25 | End: 2021-04-09 | Stop reason: SDUPTHER

## 2021-04-08 ENCOUNTER — APPOINTMENT (OUTPATIENT)
Dept: LAB | Facility: HOSPITAL | Age: 56
End: 2021-04-08
Payer: COMMERCIAL

## 2021-04-08 DIAGNOSIS — E78.1 HYPERTRIGLYCERIDEMIA: ICD-10-CM

## 2021-04-08 DIAGNOSIS — E53.8 LOW VITAMIN B12 LEVEL: ICD-10-CM

## 2021-04-08 DIAGNOSIS — E03.9 HYPOTHYROIDISM: ICD-10-CM

## 2021-04-08 LAB
ANION GAP SERPL CALCULATED.3IONS-SCNC: 3 MMOL/L (ref 4–13)
BUN SERPL-MCNC: 14 MG/DL (ref 5–25)
CALCIUM SERPL-MCNC: 8.7 MG/DL (ref 8.3–10.1)
CHLORIDE SERPL-SCNC: 107 MMOL/L (ref 100–108)
CHOLEST SERPL-MCNC: 207 MG/DL (ref 50–200)
CO2 SERPL-SCNC: 30 MMOL/L (ref 21–32)
CREAT SERPL-MCNC: 1.2 MG/DL (ref 0.6–1.3)
EST. AVERAGE GLUCOSE BLD GHB EST-MCNC: 114 MG/DL
GFR SERPL CREATININE-BSD FRML MDRD: 67 ML/MIN/1.73SQ M
GLUCOSE P FAST SERPL-MCNC: 100 MG/DL (ref 65–99)
HBA1C MFR BLD: 5.6 %
HDLC SERPL-MCNC: 43 MG/DL
LDLC SERPL DIRECT ASSAY-MCNC: 127 MG/DL (ref 0–100)
POTASSIUM SERPL-SCNC: 4 MMOL/L (ref 3.5–5.3)
SODIUM SERPL-SCNC: 140 MMOL/L (ref 136–145)
TRIGL SERPL-MCNC: 419 MG/DL
TSH SERPL DL<=0.05 MIU/L-ACNC: 3.31 UIU/ML (ref 0.36–3.74)

## 2021-04-08 PROCEDURE — 83036 HEMOGLOBIN GLYCOSYLATED A1C: CPT

## 2021-04-08 PROCEDURE — 83721 ASSAY OF BLOOD LIPOPROTEIN: CPT

## 2021-04-08 PROCEDURE — 80048 BASIC METABOLIC PNL TOTAL CA: CPT

## 2021-04-08 PROCEDURE — 36415 COLL VENOUS BLD VENIPUNCTURE: CPT

## 2021-04-08 PROCEDURE — 84443 ASSAY THYROID STIM HORMONE: CPT

## 2021-04-08 PROCEDURE — 80061 LIPID PANEL: CPT

## 2021-04-09 ENCOUNTER — OFFICE VISIT (OUTPATIENT)
Dept: INTERNAL MEDICINE CLINIC | Facility: CLINIC | Age: 56
End: 2021-04-09

## 2021-04-09 VITALS
DIASTOLIC BLOOD PRESSURE: 72 MMHG | TEMPERATURE: 97.3 F | SYSTOLIC BLOOD PRESSURE: 99 MMHG | WEIGHT: 188.2 LBS | BODY MASS INDEX: 27.79 KG/M2 | HEART RATE: 94 BPM

## 2021-04-09 DIAGNOSIS — E78.1 HYPERTRIGLYCERIDEMIA: ICD-10-CM

## 2021-04-09 DIAGNOSIS — Z23 NEED FOR PNEUMOCOCCAL VACCINATION: ICD-10-CM

## 2021-04-09 DIAGNOSIS — R05.9 COUGH: ICD-10-CM

## 2021-04-09 DIAGNOSIS — K21.9 GASTROESOPHAGEAL REFLUX DISEASE: ICD-10-CM

## 2021-04-09 DIAGNOSIS — E03.9 HYPOTHYROIDISM: ICD-10-CM

## 2021-04-09 DIAGNOSIS — Z78.9 HISTORY OF EXCESSIVE CERUMEN: Primary | ICD-10-CM

## 2021-04-09 DIAGNOSIS — J30.9 ALLERGIC RHINITIS: ICD-10-CM

## 2021-04-09 PROCEDURE — 99213 OFFICE O/P EST LOW 20 MIN: CPT | Performed by: INTERNAL MEDICINE

## 2021-04-09 PROCEDURE — 1036F TOBACCO NON-USER: CPT | Performed by: INTERNAL MEDICINE

## 2021-04-09 RX ORDER — FLUTICASONE PROPIONATE 50 MCG
1 SPRAY, SUSPENSION (ML) NASAL DAILY
Qty: 16 G | Refills: 0 | Status: SHIPPED | OUTPATIENT
Start: 2021-04-09 | End: 2021-05-11

## 2021-04-09 RX ORDER — LEVOTHYROXINE SODIUM 0.12 MG/1
125 TABLET ORAL DAILY
Qty: 90 TABLET | Refills: 1 | Status: SHIPPED | OUTPATIENT
Start: 2021-04-09 | End: 2021-10-05

## 2021-04-09 RX ORDER — ACETAMINOPHEN 325 MG/1
650 TABLET ORAL EVERY 6 HOURS PRN
Qty: 30 TABLET | Refills: 0 | Status: SHIPPED | OUTPATIENT
Start: 2021-04-09 | End: 2021-12-10 | Stop reason: SDUPTHER

## 2021-04-09 RX ORDER — ASPIRIN 81 MG/1
81 TABLET ORAL DAILY
Qty: 30 TABLET | Refills: 2 | Status: SHIPPED | OUTPATIENT
Start: 2021-04-09 | End: 2021-07-29

## 2021-04-09 RX ORDER — OMEPRAZOLE 40 MG/1
40 CAPSULE, DELAYED RELEASE ORAL DAILY
Qty: 30 CAPSULE | Refills: 1 | Status: SHIPPED | OUTPATIENT
Start: 2021-04-09 | End: 2021-06-20

## 2021-04-09 RX ORDER — LORATADINE 10 MG/1
10 TABLET ORAL DAILY
Qty: 30 TABLET | Refills: 0 | Status: SHIPPED | OUTPATIENT
Start: 2021-04-09 | End: 2021-12-10 | Stop reason: SDUPTHER

## 2021-04-09 RX ORDER — ACETAMINOPHEN 160 MG
2000 TABLET,DISINTEGRATING ORAL DAILY
Qty: 30 CAPSULE | Refills: 3 | Status: SHIPPED | OUTPATIENT
Start: 2021-04-09 | End: 2021-07-20

## 2021-04-09 NOTE — PROGRESS NOTES
ASSESSMENT/PLAN:  Diagnoses and all orders for this visit:    History of excessive cerumen  -     carbamide peroxide (DEBROX) 6 5 % otic solution; Administer 5 drops into both ears 2 (two) times a day    Allergic rhinitis  -     fluticasone (FLONASE) 50 mcg/act nasal spray; 1 spray into each nostril daily    Hypothyroidism  -     levothyroxine 125 mcg tablet; Take 1 tablet (125 mcg total) by mouth daily    Cough  -     loratadine (CLARITIN) 10 mg tablet; Take 1 tablet (10 mg total) by mouth daily    Gastroesophageal reflux disease  -     omeprazole (PriLOSEC) 40 MG capsule; Take 1 capsule (40 mg total) by mouth daily    Hypertriglyceridemia  -     Lipid panel; Future  -       ASCVD risk score noted to be 4 6%  -       Advised healthy diet with decrease in red meats and fatty foods  I also advised patient to decrease cholesterol intake  We also talked about exercise  Patient stated that he exercises about 30 minutes a day on what seems to be a treadmill at home  -   Patient states that he does not drink any alcohol   -   Will repeat a lipid panel in 6 weeks  -   Can consider addition of a statin or fenofibrate  Health Maintenance:  Advised diet and exercise  Advised to refrain from tobacco, alcohol, illicit drug use  Advised medical compliance  Colonoscopy performed 2019 revealed tubular adenomas - next due 2024  HIV test performed and negative in 2015 per patient  Schedule a follow-up appointment in 3-6 months for AWV  CHIEF COMPLAINT:   Follow-up    HISTORY OF PRESENT ILLNESS:  Patient is a 63year old German-speaking male with a PMHx significant for hypertriglyceridemia, hypothyroidism, bipolar disorder, glaucoma, left eye cataract, and GERD  He presents today for follow up on chronic conditions  Patient states that he has some cerumen in his ear for which he uses debrox  He is requesting refill on that medication  All medications requested were refilled at this time    Patient's only other complaint is constipation  He stated that he typically takes MiraLax, however he has not taken MiraLax for 4 days  Advised patient to take MiraLax daily until he has bowel movement  Patient is agreeable to this  Patient stated that he has knee pain and will be seeing  Orthopedic surgery 4/21/2021  The following portions of the patient's history were reviewed and updated as appropriate: allergies, current medications, past family history, past medical history, past social history, past surgical history and problem list     Review of Systems   Constitutional: Negative  Respiratory: Negative  Cardiovascular: Negative  Gastrointestinal: Positive for constipation  Negative for abdominal pain, diarrhea, nausea and vomiting  Musculoskeletal: Positive for arthralgias  Knee pain   Skin: Negative  OBJECTIVE:  Vitals:    04/09/21 0832   BP: 99/72   BP Location: Left arm   Patient Position: Sitting   Cuff Size: Adult   Pulse: 94   Temp: (!) 97 3 °F (36 3 °C)   TempSrc: Temporal   Weight: 85 4 kg (188 lb 3 2 oz)     Physical Exam  Constitutional:       Appearance: Normal appearance  HENT:      Head: Normocephalic and atraumatic  Right Ear: Tympanic membrane normal       Left Ear: Tympanic membrane normal       Ears:      Comments: Some cerumen in bilateral ears  Nose: Nose normal       Mouth/Throat:      Mouth: Mucous membranes are moist       Pharynx: Oropharynx is clear  Eyes:      Extraocular Movements: Extraocular movements intact  Conjunctiva/sclera: Conjunctivae normal    Neck:      Musculoskeletal: Normal range of motion  Cardiovascular:      Rate and Rhythm: Normal rate and regular rhythm  Heart sounds: No murmur  No friction rub  No gallop  Pulmonary:      Effort: Pulmonary effort is normal  No respiratory distress  Breath sounds: Normal breath sounds  No stridor  No wheezing, rhonchi or rales  Chest:      Chest wall: No tenderness     Abdominal: General: Bowel sounds are normal  There is distension  Palpations: Abdomen is soft  There is no mass  Tenderness: There is no abdominal tenderness  There is no guarding or rebound  Hernia: No hernia is present  Skin:     General: Skin is warm and dry  Neurological:      General: No focal deficit present  Mental Status: He is alert and oriented to person, place, and time  Current Outpatient Medications:     acetaminophen (TYLENOL) 325 mg tablet, Take 2 tablets (650 mg total) by mouth every 6 (six) hours as needed for mild pain, Disp: 30 tablet, Rfl: 0    aspirin (ECOTRIN LOW STRENGTH) 81 mg EC tablet, Take 1 tablet (81 mg total) by mouth daily, Disp: 30 tablet, Rfl: 2    brimonidine (ALPHAGAN P) 0 15 % ophthalmic solution, Apply 1 drop to eye 2 (two) times a day, Disp: , Rfl:     Cholecalciferol (Vitamin D3) 50 MCG (2000 UT) capsule, TAKE 1 CAPSULE (2,000 UNITS TOTAL) BY MOUTH DAILY, Disp: 30 capsule, Rfl: 3    diclofenac sodium (VOLTAREN) 1 %, Apply 2 g topically 4 (four) times a day, Disp: 100 g, Rfl: 5    fluticasone (FLONASE) 50 mcg/act nasal spray, PLACE 1 SPRAY INTO EACH NOSTRIL DAILY, Disp: 16 g, Rfl: 0    latanoprost (XALATAN) 0 005 % ophthalmic solution, Apply 1 drop to eye daily, Disp: , Rfl:     levothyroxine 125 mcg tablet, Take 1 tablet (125 mcg total) by mouth daily, Disp: 90 tablet, Rfl: 1    loratadine (CLARITIN) 10 mg tablet, TAKE 1 TABLET (10 MG TOTAL) BY MOUTH DAILY, Disp: 30 tablet, Rfl: 0    LORazepam (ATIVAN) 1 mg tablet, Take 1 mg by mouth daily, Disp: , Rfl:     omeprazole (PriLOSEC) 40 MG capsule, Take 1 capsule (40 mg total) by mouth daily, Disp: 30 capsule, Rfl: 1    polyethylene glycol (GLYCOLAX) 17 GM/SCOOP powder, Take 17 g by mouth daily, Disp: 510 g, Rfl: 2    QUEtiapine (SEROQUEL) 100 mg tablet, Take 100 mg by mouth daily at bedtime  , Disp: , Rfl:     simethicone (MYLICON,GAS-X) 621 MG CAPS, Take 1 capsule (125 mg total) by mouth every 8 (eight) hours as needed for flatulence (stomach gas), Disp: 90 capsule, Rfl: 0    timolol (TIMOPTIC) 0 5 % ophthalmic solution, Apply 1 drop to eye every morning, Disp: 10 mL, Rfl: 3    traZODone (DESYREL) 100 mg tablet, TAKE 1 TABLET (100 MG TOTAL) BY MOUTH DAILY AT BEDTIME, Disp: 30 tablet, Rfl: 2    vitamin B-12 (CYANOCOBALAMIN) 250 MCG tablet, Take 1 tablet (250 mcg total) by mouth daily, Disp: 90 tablet, Rfl: 3    Past Medical History:   Diagnosis Date    Anxiety     Bipolar disorder (HCC)     [facility]omy[facility]     Depression     GERD (gastroesophageal reflux disease)     Glaucoma     Hiatal hernia     Hyperlipidemia     Hyperthyroidism     Thyroid disease      Past Surgical History:   Procedure Laterality Date    CHOLECYSTECTOMY      CHOLECYSTECTOMY LAPAROSCOPIC      last assessed: 4/23/15    COLONOSCOPY      EYE SURGERY      LA COLONOSCOPY FLX DX W/COLLJ SPEC WHEN PFRMD N/A 4/4/2019    Procedure: COLONOSCOPY;  Surgeon: Charis Phan MD;  [facility]; Service: Gastroenterology    LA ESOPHAGOGASTRODUODENOSCOPY TRANSORAL DIAGNOSTIC N/A 4/4/2019    Procedure: ESOPHAGOGASTRODUODENOSCOPY (EGD); Surgeon: Charis Phan MD;  [facility];   Service: Gastroenterology    UPPER GASTROINTESTINAL ENDOSCOPY       Social History     Socioeconomic History    Marital status: Single     Spouse name: Not on file    Number of children: Not on file    Years of education: Not on file    Highest education level: Not on file   Occupational History    Not on file   Social Needs    Financial resource strain: Not on file    Food insecurity     Worry: Not on file     Inability: Not on file    Transportation needs     Medical: Not on file     Non-medical: Not on file   Tobacco Use    Smoking status: Never Smoker    Smokeless tobacco: Never Used   Substance and Sexual Activity    Alcohol use: No    Drug use: No    Sexual activity: Not Currently   Lifestyle    Physical activity     Days per week: Not on file     Minutes per session: Not on file    Stress: Not on file   Relationships    Social connections     Talks on phone: Not on file     Gets together: Not on file     Attends Buddhist service: Not on file     Active member of club or organization: Not on file     Attends meetings of clubs or organizations: Not on file     Relationship status: Not on file    Intimate partner violence     Fear of current or ex partner: Not on file     Emotionally abused: Not on file     Physically abused: Not on file     Forced sexual activity: Not on file   Other Topics Concern    Not on file   Social History Narrative    Occasional caffeine use    Sedentary lifestyle     Family History   Problem Relation Age of Onset    Diabetes Mother     Hyperlipidemia Mother     Thyroid cancer Mother     Dementia Father     Coronary artery disease Maternal Grandmother     Hypertension Maternal Grandmother     Heart attack Maternal Grandmother        ==  MD Neema Denton [address] Internal Medicine PGY-2    [place] [address]  [address] [facility] [address] , [address], [address] [place] [address]  Office: [phone]  Fax: [phone]

## 2021-04-15 RX ORDER — TRAZODONE HYDROCHLORIDE 100 MG/1
100 TABLET ORAL
Qty: 30 TABLET | Refills: 2 | Status: SHIPPED | OUTPATIENT
Start: 2021-04-15 | End: 2021-06-25

## 2021-05-11 ENCOUNTER — APPOINTMENT (OUTPATIENT)
Dept: LAB | Facility: HOSPITAL | Age: 56
End: 2021-05-11
Payer: COMMERCIAL

## 2021-05-11 DIAGNOSIS — J30.9 ALLERGIC RHINITIS: ICD-10-CM

## 2021-05-11 DIAGNOSIS — E78.1 HYPERTRIGLYCERIDEMIA: ICD-10-CM

## 2021-05-11 LAB
CHOLEST SERPL-MCNC: 206 MG/DL (ref 50–200)
HDLC SERPL-MCNC: 42 MG/DL
LDLC SERPL CALC-MCNC: 93 MG/DL (ref 0–100)
NONHDLC SERPL-MCNC: 164 MG/DL
TRIGL SERPL-MCNC: 356 MG/DL

## 2021-05-11 PROCEDURE — 80061 LIPID PANEL: CPT

## 2021-05-11 PROCEDURE — 36415 COLL VENOUS BLD VENIPUNCTURE: CPT

## 2021-05-11 RX ORDER — FLUTICASONE PROPIONATE 50 MCG
1 SPRAY, SUSPENSION (ML) NASAL DAILY
Qty: 16 G | Refills: 0 | Status: SHIPPED | OUTPATIENT
Start: 2021-05-11 | End: 2021-06-05

## 2021-06-05 DIAGNOSIS — J30.9 ALLERGIC RHINITIS: ICD-10-CM

## 2021-06-05 RX ORDER — FLUTICASONE PROPIONATE 50 MCG
1 SPRAY, SUSPENSION (ML) NASAL DAILY
Qty: 16 G | Refills: 0 | Status: SHIPPED | OUTPATIENT
Start: 2021-06-05 | End: 2021-07-01

## 2021-06-17 ENCOUNTER — VBI (OUTPATIENT)
Dept: ADMINISTRATIVE | Facility: OTHER | Age: 56
End: 2021-06-17

## 2021-06-19 DIAGNOSIS — K21.9 GASTROESOPHAGEAL REFLUX DISEASE: ICD-10-CM

## 2021-06-20 RX ORDER — OMEPRAZOLE 40 MG/1
40 CAPSULE, DELAYED RELEASE ORAL DAILY
Qty: 30 CAPSULE | Refills: 1 | Status: SHIPPED | OUTPATIENT
Start: 2021-06-20 | End: 2021-07-29

## 2021-06-25 DIAGNOSIS — Z23 NEED FOR PNEUMOCOCCAL VACCINATION: ICD-10-CM

## 2021-06-25 RX ORDER — TRAZODONE HYDROCHLORIDE 100 MG/1
100 TABLET ORAL
Qty: 30 TABLET | Refills: 2 | Status: SHIPPED | OUTPATIENT
Start: 2021-06-25 | End: 2021-08-09

## 2021-06-30 DIAGNOSIS — J30.9 ALLERGIC RHINITIS: ICD-10-CM

## 2021-07-01 RX ORDER — FLUTICASONE PROPIONATE 50 MCG
SPRAY, SUSPENSION (ML) NASAL
Qty: 16 G | Refills: 0 | Status: SHIPPED | OUTPATIENT
Start: 2021-07-01 | End: 2021-07-29

## 2021-07-17 DIAGNOSIS — Z23 NEED FOR PNEUMOCOCCAL VACCINATION: ICD-10-CM

## 2021-07-20 RX ORDER — ACETAMINOPHEN 160 MG
2000 TABLET,DISINTEGRATING ORAL DAILY
Qty: 30 CAPSULE | Refills: 3 | Status: SHIPPED | OUTPATIENT
Start: 2021-07-20 | End: 2021-07-29

## 2021-07-26 DIAGNOSIS — J30.9 ALLERGIC RHINITIS: ICD-10-CM

## 2021-07-27 DIAGNOSIS — Z23 NEED FOR PNEUMOCOCCAL VACCINATION: ICD-10-CM

## 2021-07-27 DIAGNOSIS — K21.9 GASTROESOPHAGEAL REFLUX DISEASE: ICD-10-CM

## 2021-07-29 RX ORDER — OMEPRAZOLE 40 MG/1
40 CAPSULE, DELAYED RELEASE ORAL DAILY
Qty: 30 CAPSULE | Refills: 1 | Status: SHIPPED | OUTPATIENT
Start: 2021-07-29 | End: 2021-09-23

## 2021-07-29 RX ORDER — ACETAMINOPHEN 160 MG
2000 TABLET,DISINTEGRATING ORAL DAILY
Qty: 30 CAPSULE | Refills: 3 | Status: SHIPPED | OUTPATIENT
Start: 2021-07-29 | End: 2022-03-17

## 2021-07-29 RX ORDER — ASPIRIN 81 MG/1
81 TABLET ORAL DAILY
Qty: 30 TABLET | Refills: 2 | Status: SHIPPED | OUTPATIENT
Start: 2021-07-29 | End: 2021-11-18

## 2021-07-29 RX ORDER — FLUTICASONE PROPIONATE 50 MCG
SPRAY, SUSPENSION (ML) NASAL
Qty: 16 G | Refills: 0 | Status: SHIPPED | OUTPATIENT
Start: 2021-07-29 | End: 2021-08-23

## 2021-08-04 DIAGNOSIS — Z23 NEED FOR PNEUMOCOCCAL VACCINATION: ICD-10-CM

## 2021-08-09 DIAGNOSIS — Z23 NEED FOR PNEUMOCOCCAL VACCINATION: ICD-10-CM

## 2021-08-09 RX ORDER — TRAZODONE HYDROCHLORIDE 100 MG/1
100 TABLET ORAL
Qty: 30 TABLET | Refills: 2 | Status: SHIPPED | OUTPATIENT
Start: 2021-08-09 | End: 2021-11-18

## 2021-08-23 DIAGNOSIS — J30.9 ALLERGIC RHINITIS: ICD-10-CM

## 2021-08-23 RX ORDER — FLUTICASONE PROPIONATE 50 MCG
SPRAY, SUSPENSION (ML) NASAL
Qty: 16 G | Refills: 0 | Status: SHIPPED | OUTPATIENT
Start: 2021-08-23 | End: 2021-09-17

## 2021-09-16 ENCOUNTER — APPOINTMENT (OUTPATIENT)
Dept: LAB | Facility: CLINIC | Age: 56
End: 2021-09-16
Payer: COMMERCIAL

## 2021-09-16 DIAGNOSIS — Z79.899 ENCOUNTER FOR LONG-TERM (CURRENT) USE OF OTHER MEDICATIONS: ICD-10-CM

## 2021-09-16 LAB
25(OH)D3 SERPL-MCNC: 14.8 NG/ML (ref 30–100)
ALBUMIN SERPL BCP-MCNC: 3.4 G/DL (ref 3.5–5)
ALP SERPL-CCNC: 71 U/L (ref 46–116)
ALT SERPL W P-5'-P-CCNC: 41 U/L (ref 12–78)
ANION GAP SERPL CALCULATED.3IONS-SCNC: 3 MMOL/L (ref 4–13)
AST SERPL W P-5'-P-CCNC: 18 U/L (ref 5–45)
BASOPHILS # BLD AUTO: 0.05 THOUSANDS/ΜL (ref 0–0.1)
BASOPHILS NFR BLD AUTO: 1 % (ref 0–1)
BILIRUB SERPL-MCNC: 0.43 MG/DL (ref 0.2–1)
BUN SERPL-MCNC: 11 MG/DL (ref 5–25)
CALCIUM ALBUM COR SERPL-MCNC: 9.1 MG/DL (ref 8.3–10.1)
CALCIUM SERPL-MCNC: 8.6 MG/DL (ref 8.3–10.1)
CHLORIDE SERPL-SCNC: 104 MMOL/L (ref 100–108)
CHOLEST SERPL-MCNC: 207 MG/DL (ref 50–200)
CO2 SERPL-SCNC: 28 MMOL/L (ref 21–32)
CREAT SERPL-MCNC: 1.03 MG/DL (ref 0.6–1.3)
EOSINOPHIL # BLD AUTO: 0.15 THOUSAND/ΜL (ref 0–0.61)
EOSINOPHIL NFR BLD AUTO: 3 % (ref 0–6)
ERYTHROCYTE [DISTWIDTH] IN BLOOD BY AUTOMATED COUNT: 14.1 % (ref 11.6–15.1)
EST. AVERAGE GLUCOSE BLD GHB EST-MCNC: 117 MG/DL
GFR SERPL CREATININE-BSD FRML MDRD: 80 ML/MIN/1.73SQ M
GLUCOSE P FAST SERPL-MCNC: 106 MG/DL (ref 65–99)
HBA1C MFR BLD: 5.7 %
HCT VFR BLD AUTO: 49.4 % (ref 36.5–49.3)
HDLC SERPL-MCNC: 38 MG/DL
HGB BLD-MCNC: 15.5 G/DL (ref 12–17)
IMM GRANULOCYTES # BLD AUTO: 0.06 THOUSAND/UL (ref 0–0.2)
IMM GRANULOCYTES NFR BLD AUTO: 1 % (ref 0–2)
LDLC SERPL CALC-MCNC: 90 MG/DL (ref 0–100)
LYMPHOCYTES # BLD AUTO: 1.97 THOUSANDS/ΜL (ref 0.6–4.47)
LYMPHOCYTES NFR BLD AUTO: 33 % (ref 14–44)
MCH RBC QN AUTO: 27.8 PG (ref 26.8–34.3)
MCHC RBC AUTO-ENTMCNC: 31.4 G/DL (ref 31.4–37.4)
MCV RBC AUTO: 89 FL (ref 82–98)
MONOCYTES # BLD AUTO: 0.51 THOUSAND/ΜL (ref 0.17–1.22)
MONOCYTES NFR BLD AUTO: 9 % (ref 4–12)
NEUTROPHILS # BLD AUTO: 3.24 THOUSANDS/ΜL (ref 1.85–7.62)
NEUTS SEG NFR BLD AUTO: 53 % (ref 43–75)
NONHDLC SERPL-MCNC: 169 MG/DL
NRBC BLD AUTO-RTO: 0 /100 WBCS
PLATELET # BLD AUTO: 201 THOUSANDS/UL (ref 149–390)
PMV BLD AUTO: 10.4 FL (ref 8.9–12.7)
POTASSIUM SERPL-SCNC: 3.7 MMOL/L (ref 3.5–5.3)
PROLACTIN SERPL-MCNC: 23.2 NG/ML (ref 2.5–17.4)
PROT SERPL-MCNC: 7.4 G/DL (ref 6.4–8.2)
RBC # BLD AUTO: 5.57 MILLION/UL (ref 3.88–5.62)
SODIUM SERPL-SCNC: 135 MMOL/L (ref 136–145)
T3 SERPL-MCNC: 0.7 NG/ML (ref 0.6–1.8)
T4 FREE SERPL-MCNC: 0.91 NG/DL (ref 0.76–1.46)
TRIGL SERPL-MCNC: 394 MG/DL
TSH SERPL DL<=0.05 MIU/L-ACNC: 6.83 UIU/ML (ref 0.36–3.74)
WBC # BLD AUTO: 5.98 THOUSAND/UL (ref 4.31–10.16)

## 2021-09-16 PROCEDURE — 84480 ASSAY TRIIODOTHYRONINE (T3): CPT

## 2021-09-16 PROCEDURE — 36415 COLL VENOUS BLD VENIPUNCTURE: CPT

## 2021-09-16 PROCEDURE — 83036 HEMOGLOBIN GLYCOSYLATED A1C: CPT

## 2021-09-16 PROCEDURE — 84443 ASSAY THYROID STIM HORMONE: CPT

## 2021-09-16 PROCEDURE — 85025 COMPLETE CBC W/AUTO DIFF WBC: CPT

## 2021-09-16 PROCEDURE — 82306 VITAMIN D 25 HYDROXY: CPT

## 2021-09-16 PROCEDURE — 80053 COMPREHEN METABOLIC PANEL: CPT

## 2021-09-16 PROCEDURE — 84146 ASSAY OF PROLACTIN: CPT

## 2021-09-16 PROCEDURE — 80061 LIPID PANEL: CPT

## 2021-09-16 PROCEDURE — 84439 ASSAY OF FREE THYROXINE: CPT

## 2021-09-17 DIAGNOSIS — J30.9 ALLERGIC RHINITIS: ICD-10-CM

## 2021-09-17 RX ORDER — FLUTICASONE PROPIONATE 50 MCG
SPRAY, SUSPENSION (ML) NASAL
Qty: 16 G | Refills: 0 | Status: SHIPPED | OUTPATIENT
Start: 2021-09-17 | End: 2021-10-12

## 2021-10-04 DIAGNOSIS — E03.9 HYPOTHYROIDISM: ICD-10-CM

## 2021-10-05 RX ORDER — LEVOTHYROXINE SODIUM 0.12 MG/1
125 TABLET ORAL DAILY
Qty: 90 TABLET | Refills: 1 | Status: SHIPPED | OUTPATIENT
Start: 2021-10-05 | End: 2021-12-07

## 2021-10-12 DIAGNOSIS — J30.9 ALLERGIC RHINITIS: ICD-10-CM

## 2021-10-12 RX ORDER — FLUTICASONE PROPIONATE 50 MCG
SPRAY, SUSPENSION (ML) NASAL
Qty: 16 G | Refills: 0 | Status: SHIPPED | OUTPATIENT
Start: 2021-10-12 | End: 2021-11-09

## 2021-10-14 ENCOUNTER — CLINICAL SUPPORT (OUTPATIENT)
Dept: INTERNAL MEDICINE CLINIC | Facility: CLINIC | Age: 56
End: 2021-10-14

## 2021-10-14 DIAGNOSIS — Z23 NEED FOR INFLUENZA VACCINATION: Primary | ICD-10-CM

## 2021-10-14 PROCEDURE — G0008 ADMIN INFLUENZA VIRUS VAC: HCPCS | Performed by: HOSPITALIST

## 2021-10-14 PROCEDURE — 90682 RIV4 VACC RECOMBINANT DNA IM: CPT | Performed by: HOSPITALIST

## 2021-11-06 DIAGNOSIS — J30.9 ALLERGIC RHINITIS: ICD-10-CM

## 2021-11-08 DIAGNOSIS — Z23 NEED FOR PNEUMOCOCCAL VACCINATION: ICD-10-CM

## 2021-11-09 RX ORDER — FLUTICASONE PROPIONATE 50 MCG
SPRAY, SUSPENSION (ML) NASAL
Qty: 16 G | Refills: 0 | Status: SHIPPED | OUTPATIENT
Start: 2021-11-09 | End: 2021-12-06

## 2021-11-10 DIAGNOSIS — K21.9 GASTROESOPHAGEAL REFLUX DISEASE: ICD-10-CM

## 2021-11-10 RX ORDER — OMEPRAZOLE 40 MG/1
40 CAPSULE, DELAYED RELEASE ORAL DAILY
Qty: 30 CAPSULE | Refills: 1 | Status: SHIPPED | OUTPATIENT
Start: 2021-11-10 | End: 2021-12-06

## 2021-11-15 DIAGNOSIS — Z23 NEED FOR PNEUMOCOCCAL VACCINATION: ICD-10-CM

## 2021-11-18 RX ORDER — TRAZODONE HYDROCHLORIDE 100 MG/1
100 TABLET ORAL
Qty: 30 TABLET | Refills: 2 | Status: SHIPPED | OUTPATIENT
Start: 2021-11-18 | End: 2022-01-31

## 2021-11-18 RX ORDER — ASPIRIN 81 MG/1
81 TABLET ORAL DAILY
Qty: 30 TABLET | Refills: 2 | Status: SHIPPED | OUTPATIENT
Start: 2021-11-18 | End: 2021-12-10 | Stop reason: SDUPTHER

## 2021-12-07 DIAGNOSIS — E03.9 HYPOTHYROIDISM: ICD-10-CM

## 2021-12-07 RX ORDER — LEVOTHYROXINE SODIUM 0.12 MG/1
125 TABLET ORAL DAILY
Qty: 90 TABLET | Refills: 1 | Status: SHIPPED | OUTPATIENT
Start: 2021-12-07 | End: 2021-12-10 | Stop reason: SDUPTHER

## 2021-12-10 ENCOUNTER — APPOINTMENT (OUTPATIENT)
Dept: LAB | Facility: CLINIC | Age: 56
End: 2021-12-10
Payer: COMMERCIAL

## 2021-12-10 ENCOUNTER — OFFICE VISIT (OUTPATIENT)
Dept: INTERNAL MEDICINE CLINIC | Facility: CLINIC | Age: 56
End: 2021-12-10

## 2021-12-10 VITALS
OXYGEN SATURATION: 96 % | TEMPERATURE: 98 F | BODY MASS INDEX: 28.04 KG/M2 | DIASTOLIC BLOOD PRESSURE: 69 MMHG | SYSTOLIC BLOOD PRESSURE: 96 MMHG | WEIGHT: 185 LBS | HEIGHT: 68 IN

## 2021-12-10 DIAGNOSIS — M25.561 CHRONIC PAIN OF BOTH KNEES: ICD-10-CM

## 2021-12-10 DIAGNOSIS — R07.81 RIB PAIN: Primary | ICD-10-CM

## 2021-12-10 DIAGNOSIS — R05.9 COUGH: ICD-10-CM

## 2021-12-10 DIAGNOSIS — K21.9 GASTROESOPHAGEAL REFLUX DISEASE: ICD-10-CM

## 2021-12-10 DIAGNOSIS — G89.29 CHRONIC PAIN OF BOTH KNEES: ICD-10-CM

## 2021-12-10 DIAGNOSIS — E03.9 HYPOTHYROIDISM: ICD-10-CM

## 2021-12-10 DIAGNOSIS — M25.562 CHRONIC PAIN OF BOTH KNEES: ICD-10-CM

## 2021-12-10 DIAGNOSIS — K59.00 CONSTIPATION: ICD-10-CM

## 2021-12-10 DIAGNOSIS — Z79.899 ENCOUNTER FOR LONG-TERM (CURRENT) USE OF OTHER MEDICATIONS: ICD-10-CM

## 2021-12-10 DIAGNOSIS — Z23 NEED FOR PNEUMOCOCCAL VACCINATION: ICD-10-CM

## 2021-12-10 DIAGNOSIS — R14.0 BLOATING: ICD-10-CM

## 2021-12-10 LAB
PROLACTIN SERPL-MCNC: 36.8 NG/ML (ref 2.5–17.4)
T3 SERPL-MCNC: 0.8 NG/ML (ref 0.6–1.8)
T4 FREE SERPL-MCNC: 1.02 NG/DL (ref 0.76–1.46)
TSH SERPL DL<=0.05 MIU/L-ACNC: 6.21 UIU/ML (ref 0.36–3.74)

## 2021-12-10 PROCEDURE — 84480 ASSAY TRIIODOTHYRONINE (T3): CPT

## 2021-12-10 PROCEDURE — 36415 COLL VENOUS BLD VENIPUNCTURE: CPT

## 2021-12-10 PROCEDURE — 84443 ASSAY THYROID STIM HORMONE: CPT

## 2021-12-10 PROCEDURE — 84146 ASSAY OF PROLACTIN: CPT

## 2021-12-10 PROCEDURE — 84439 ASSAY OF FREE THYROXINE: CPT

## 2021-12-10 PROCEDURE — 3008F BODY MASS INDEX DOCD: CPT | Performed by: INTERNAL MEDICINE

## 2021-12-10 PROCEDURE — G0438 PPPS, INITIAL VISIT: HCPCS | Performed by: INTERNAL MEDICINE

## 2021-12-10 PROCEDURE — 3725F SCREEN DEPRESSION PERFORMED: CPT | Performed by: INTERNAL MEDICINE

## 2021-12-10 RX ORDER — SIMETHICONE 125 MG
125 CAPSULE ORAL EVERY 8 HOURS PRN
Qty: 90 CAPSULE | Refills: 0 | Status: SHIPPED | OUTPATIENT
Start: 2021-12-10 | End: 2022-01-23

## 2021-12-10 RX ORDER — ACETAMINOPHEN 325 MG/1
650 TABLET ORAL EVERY 6 HOURS PRN
Qty: 30 TABLET | Refills: 0 | Status: SHIPPED | OUTPATIENT
Start: 2021-12-10 | End: 2022-06-01

## 2021-12-10 RX ORDER — LORATADINE 10 MG/1
10 TABLET ORAL DAILY
Qty: 30 TABLET | Refills: 0 | Status: SHIPPED | OUTPATIENT
Start: 2021-12-10 | End: 2022-01-04

## 2021-12-10 RX ORDER — POLYETHYLENE GLYCOL 3350 17 G/17G
17 POWDER, FOR SOLUTION ORAL DAILY
Qty: 510 G | Refills: 2 | Status: SHIPPED | OUTPATIENT
Start: 2021-12-10 | End: 2022-03-10

## 2021-12-10 RX ORDER — ASPIRIN 81 MG/1
81 TABLET ORAL DAILY
Qty: 30 TABLET | Refills: 2 | Status: SHIPPED | OUTPATIENT
Start: 2021-12-10 | End: 2022-01-31

## 2021-12-10 RX ORDER — OMEPRAZOLE 40 MG/1
40 CAPSULE, DELAYED RELEASE ORAL DAILY
Qty: 90 CAPSULE | Refills: 1 | Status: SHIPPED | OUTPATIENT
Start: 2021-12-10 | End: 2022-06-27

## 2021-12-10 RX ORDER — LEVOTHYROXINE SODIUM 0.12 MG/1
125 TABLET ORAL DAILY
Qty: 90 TABLET | Refills: 1 | Status: SHIPPED | OUTPATIENT
Start: 2021-12-10 | End: 2022-08-08

## 2021-12-11 DIAGNOSIS — Z23 NEED FOR PNEUMOCOCCAL VACCINATION: ICD-10-CM

## 2021-12-30 ENCOUNTER — TELEPHONE (OUTPATIENT)
Dept: INTERNAL MEDICINE CLINIC | Facility: CLINIC | Age: 56
End: 2021-12-30

## 2021-12-30 NOTE — TELEPHONE ENCOUNTER
Patient called to state that he never received the cane    Checked the chart, printed the script, faxed to [facility] at [phone], and receipt transaction report  Advised patient that script was sent to [facility] and will receive a call

## 2022-02-24 DIAGNOSIS — Z23 NEED FOR PNEUMOCOCCAL VACCINATION: ICD-10-CM

## 2022-03-14 DIAGNOSIS — J30.9 ALLERGIC RHINITIS: ICD-10-CM

## 2022-03-14 RX ORDER — FLUTICASONE PROPIONATE 50 MCG
SPRAY, SUSPENSION (ML) NASAL
Qty: 16 G | Refills: 3 | Status: SHIPPED | OUTPATIENT
Start: 2022-03-14 | End: 2022-06-23

## 2022-03-17 DIAGNOSIS — Z23 NEED FOR PNEUMOCOCCAL VACCINATION: ICD-10-CM

## 2022-03-17 RX ORDER — ACETAMINOPHEN 160 MG
2000 TABLET,DISINTEGRATING ORAL DAILY
Qty: 30 CAPSULE | Refills: 3 | Status: SHIPPED | OUTPATIENT
Start: 2022-03-17 | End: 2022-04-13

## 2022-03-19 DIAGNOSIS — Z23 NEED FOR PNEUMOCOCCAL VACCINATION: ICD-10-CM

## 2022-03-21 DIAGNOSIS — R14.0 BLOATING: ICD-10-CM

## 2022-03-22 RX ORDER — SIMETHICONE 125 MG
125 CAPSULE ORAL EVERY 8 HOURS PRN
Qty: 90 CAPSULE | Refills: 1 | Status: SHIPPED | OUTPATIENT
Start: 2022-03-22

## 2022-05-02 DIAGNOSIS — Z23 NEED FOR PNEUMOCOCCAL VACCINATION: ICD-10-CM

## 2022-06-01 DIAGNOSIS — Z23 NEED FOR PNEUMOCOCCAL VACCINATION: ICD-10-CM

## 2022-06-01 RX ORDER — ACETAMINOPHEN 325 MG/1
650 TABLET ORAL EVERY 6 HOURS PRN
Qty: 30 TABLET | Refills: 0 | Status: SHIPPED | OUTPATIENT
Start: 2022-06-01

## 2022-06-23 DIAGNOSIS — J30.9 ALLERGIC RHINITIS: ICD-10-CM

## 2022-06-23 RX ORDER — FLUTICASONE PROPIONATE 50 MCG
SPRAY, SUSPENSION (ML) NASAL
Qty: 16 G | Refills: 3 | Status: SHIPPED | OUTPATIENT
Start: 2022-06-23

## 2022-07-01 DIAGNOSIS — Z23 NEED FOR PNEUMOCOCCAL VACCINATION: ICD-10-CM

## 2022-07-12 ENCOUNTER — APPOINTMENT (OUTPATIENT)
Dept: LAB | Facility: CLINIC | Age: 57
End: 2022-07-12
Payer: COMMERCIAL

## 2022-07-12 DIAGNOSIS — Z79.899 ENCOUNTER FOR LONG-TERM (CURRENT) USE OF OTHER MEDICATIONS: ICD-10-CM

## 2022-07-12 LAB
25(OH)D3 SERPL-MCNC: 26 NG/ML (ref 30–100)
ALBUMIN SERPL BCP-MCNC: 3.4 G/DL (ref 3.5–5)
ALP SERPL-CCNC: 70 U/L (ref 46–116)
ALT SERPL W P-5'-P-CCNC: 56 U/L (ref 12–78)
ANION GAP SERPL CALCULATED.3IONS-SCNC: 3 MMOL/L (ref 4–13)
AST SERPL W P-5'-P-CCNC: 28 U/L (ref 5–45)
BASOPHILS # BLD AUTO: 0.03 THOUSANDS/ΜL (ref 0–0.1)
BASOPHILS NFR BLD AUTO: 1 % (ref 0–1)
BILIRUB SERPL-MCNC: 0.44 MG/DL (ref 0.2–1)
BUN SERPL-MCNC: 12 MG/DL (ref 5–25)
CALCIUM ALBUM COR SERPL-MCNC: 9.2 MG/DL (ref 8.3–10.1)
CALCIUM SERPL-MCNC: 8.7 MG/DL (ref 8.3–10.1)
CHLORIDE SERPL-SCNC: 106 MMOL/L (ref 100–108)
CHOLEST SERPL-MCNC: 209 MG/DL
CO2 SERPL-SCNC: 30 MMOL/L (ref 21–32)
CREAT SERPL-MCNC: 1.13 MG/DL (ref 0.6–1.3)
EOSINOPHIL # BLD AUTO: 0.22 THOUSAND/ΜL (ref 0–0.61)
EOSINOPHIL NFR BLD AUTO: 4 % (ref 0–6)
ERYTHROCYTE [DISTWIDTH] IN BLOOD BY AUTOMATED COUNT: 13.6 % (ref 11.6–15.1)
EST. AVERAGE GLUCOSE BLD GHB EST-MCNC: 117 MG/DL
GFR SERPL CREATININE-BSD FRML MDRD: 71 ML/MIN/1.73SQ M
GLUCOSE P FAST SERPL-MCNC: 103 MG/DL (ref 65–99)
HBA1C MFR BLD: 5.7 %
HCT VFR BLD AUTO: 49.4 % (ref 36.5–49.3)
HDLC SERPL-MCNC: 42 MG/DL
HGB BLD-MCNC: 15.6 G/DL (ref 12–17)
IMM GRANULOCYTES # BLD AUTO: 0.06 THOUSAND/UL (ref 0–0.2)
IMM GRANULOCYTES NFR BLD AUTO: 1 % (ref 0–2)
LDLC SERPL CALC-MCNC: 101 MG/DL (ref 0–100)
LYMPHOCYTES # BLD AUTO: 1.78 THOUSANDS/ΜL (ref 0.6–4.47)
LYMPHOCYTES NFR BLD AUTO: 32 % (ref 14–44)
MCH RBC QN AUTO: 27.9 PG (ref 26.8–34.3)
MCHC RBC AUTO-ENTMCNC: 31.6 G/DL (ref 31.4–37.4)
MCV RBC AUTO: 88 FL (ref 82–98)
MONOCYTES # BLD AUTO: 0.42 THOUSAND/ΜL (ref 0.17–1.22)
MONOCYTES NFR BLD AUTO: 8 % (ref 4–12)
NEUTROPHILS # BLD AUTO: 3.05 THOUSANDS/ΜL (ref 1.85–7.62)
NEUTS SEG NFR BLD AUTO: 54 % (ref 43–75)
NONHDLC SERPL-MCNC: 167 MG/DL
NRBC BLD AUTO-RTO: 0 /100 WBCS
PLATELET # BLD AUTO: 191 THOUSANDS/UL (ref 149–390)
PMV BLD AUTO: 10.1 FL (ref 8.9–12.7)
POTASSIUM SERPL-SCNC: 3.7 MMOL/L (ref 3.5–5.3)
PROLACTIN SERPL-MCNC: 18.6 NG/ML (ref 2.5–17.4)
PROT SERPL-MCNC: 7.4 G/DL (ref 6.4–8.2)
RBC # BLD AUTO: 5.6 MILLION/UL (ref 3.88–5.62)
SODIUM SERPL-SCNC: 139 MMOL/L (ref 136–145)
T3 SERPL-MCNC: 0.8 NG/ML (ref 0.6–1.8)
T4 FREE SERPL-MCNC: 0.98 NG/DL (ref 0.76–1.46)
TRIGL SERPL-MCNC: 329 MG/DL
TSH SERPL DL<=0.05 MIU/L-ACNC: 5.27 UIU/ML (ref 0.45–4.5)
WBC # BLD AUTO: 5.56 THOUSAND/UL (ref 4.31–10.16)

## 2022-07-12 PROCEDURE — 83036 HEMOGLOBIN GLYCOSYLATED A1C: CPT

## 2022-07-12 PROCEDURE — 80053 COMPREHEN METABOLIC PANEL: CPT

## 2022-07-12 PROCEDURE — 84443 ASSAY THYROID STIM HORMONE: CPT

## 2022-07-12 PROCEDURE — 85025 COMPLETE CBC W/AUTO DIFF WBC: CPT

## 2022-07-12 PROCEDURE — 84439 ASSAY OF FREE THYROXINE: CPT

## 2022-07-12 PROCEDURE — 80061 LIPID PANEL: CPT

## 2022-07-12 PROCEDURE — 36415 COLL VENOUS BLD VENIPUNCTURE: CPT

## 2022-07-12 PROCEDURE — 84480 ASSAY TRIIODOTHYRONINE (T3): CPT

## 2022-07-12 PROCEDURE — 84146 ASSAY OF PROLACTIN: CPT

## 2022-07-12 PROCEDURE — 82306 VITAMIN D 25 HYDROXY: CPT

## 2022-07-30 DIAGNOSIS — Z23 NEED FOR PNEUMOCOCCAL VACCINATION: ICD-10-CM

## 2022-10-18 ENCOUNTER — APPOINTMENT (OUTPATIENT)
Dept: LAB | Facility: CLINIC | Age: 57
End: 2022-10-18
Payer: COMMERCIAL

## 2022-10-18 DIAGNOSIS — Z79.899 ENCOUNTER FOR LONG-TERM (CURRENT) USE OF OTHER MEDICATIONS: ICD-10-CM

## 2022-10-18 LAB — PROLACTIN SERPL-MCNC: 15.4 NG/ML (ref 2.5–17.4)

## 2022-10-18 PROCEDURE — 36415 COLL VENOUS BLD VENIPUNCTURE: CPT

## 2022-10-18 PROCEDURE — 84146 ASSAY OF PROLACTIN: CPT

## 2022-12-06 DIAGNOSIS — Z23 NEED FOR PNEUMOCOCCAL VACCINATION: ICD-10-CM

## 2022-12-06 RX ORDER — TRAZODONE HYDROCHLORIDE 100 MG/1
100 TABLET ORAL
Qty: 30 TABLET | Refills: 1 | Status: SHIPPED | OUTPATIENT
Start: 2022-12-06

## 2022-12-08 ENCOUNTER — OFFICE VISIT (OUTPATIENT)
Dept: INTERNAL MEDICINE CLINIC | Facility: CLINIC | Age: 57
End: 2022-12-08

## 2022-12-08 VITALS
DIASTOLIC BLOOD PRESSURE: 75 MMHG | BODY MASS INDEX: 27.37 KG/M2 | WEIGHT: 180 LBS | HEART RATE: 77 BPM | SYSTOLIC BLOOD PRESSURE: 109 MMHG | OXYGEN SATURATION: 97 % | TEMPERATURE: 98.6 F

## 2022-12-08 DIAGNOSIS — R73.03 PREDIABETES: ICD-10-CM

## 2022-12-08 DIAGNOSIS — Z11.59 NEED FOR HEPATITIS C SCREENING TEST: ICD-10-CM

## 2022-12-08 DIAGNOSIS — E03.9 HYPOTHYROIDISM, UNSPECIFIED TYPE: Primary | ICD-10-CM

## 2022-12-08 DIAGNOSIS — Z11.4 ENCOUNTER FOR SCREENING FOR HIV: ICD-10-CM

## 2022-12-08 DIAGNOSIS — Z23 NEED FOR PNEUMOCOCCAL VACCINATION: ICD-10-CM

## 2022-12-08 DIAGNOSIS — E78.00 PURE HYPERCHOLESTEROLEMIA: ICD-10-CM

## 2022-12-08 DIAGNOSIS — E53.8 LOW VITAMIN B12 LEVEL: ICD-10-CM

## 2022-12-08 DIAGNOSIS — E55.9 VITAMIN D DEFICIENCY: ICD-10-CM

## 2022-12-08 RX ORDER — MELATONIN
1000 DAILY
Qty: 30 TABLET | Refills: 5 | Status: SHIPPED | OUTPATIENT
Start: 2022-12-08 | End: 2023-06-06

## 2022-12-08 RX ORDER — ATORVASTATIN CALCIUM 20 MG/1
20 TABLET, FILM COATED ORAL DAILY
Qty: 30 TABLET | Refills: 5 | Status: SHIPPED | OUTPATIENT
Start: 2022-12-08 | End: 2023-06-06

## 2022-12-08 RX ORDER — LEVOTHYROXINE SODIUM 0.1 MG/1
150 TABLET ORAL DAILY
Qty: 45 TABLET | Refills: 5 | Status: CANCELLED | OUTPATIENT
Start: 2022-12-08 | End: 2023-06-06

## 2022-12-08 RX ORDER — ACETAMINOPHEN 325 MG/1
650 TABLET ORAL EVERY 6 HOURS PRN
Qty: 30 TABLET | Refills: 0 | Status: SHIPPED | OUTPATIENT
Start: 2022-12-08

## 2022-12-08 NOTE — PROGRESS NOTES
Clinic Visit Note  Pako Duran 61 y o  male   MRN: [id number]    Assessment and Plan      Diagnoses and all orders for this visit:    Hypothyroidism, unspecified type  Patient still with mildly elevated TSH, but normal T4  Patient is asymptomatic  We will continue current dosing of levothyroxine and recheck TSH in 6 months prior to next appointment   -     TSH + Free T4; Future    Encounter for screening for HIV  Screening due   -     HIV 1/2 Antigen/Antibody (4th Generation) w Reflex [facility]; Future    Prediabetes  Patient with an A1c of 5 7  Falls in the prediabetic range  Discussed with the patient about diet and lifestyle modifications  Vitamin D deficiency  Newly discovered  We will start the patient on vitamin D supplementation 1000 units daily  -     cholecalciferol (VITAMIN D3) 1,000 units tablet; Take 1 tablet (1,000 Units total) by mouth daily  -     Vitamin D 25 hydroxy; Future    Low vitamin B12 level  With a history of low vitamin B12  On supplementation  We will recheck levels prior to next appointment  -     Vitamin B12; Future    Pure hypercholesterolemia  She went an ASCVD of 6 6%  We will start moderate dose statin  -     atorvastatin (LIPITOR) 20 mg tablet; Take 1 tablet (20 mg total) by mouth daily    Need for hepatitis C screening test  Screening needed  -     Hepatitis C antibody;  Future          Health Maintenance:  PHQ-2/9 Depression Screening        The 10-year ASCVD risk score (Jefferson DK, et al , 2019) is: 6 6%    Values used to calculate the score:      Age: 61 years      Sex: Male      Is Non- : No      Diabetic: No      Tobacco smoker: No      Systolic Blood Pressure: 165 mmHg      Is BP treated: No      HDL Cholesterol: 42 mg/dL      Total Cholesterol: 209 mg/dL  Lab Results   Component Value Date    HGBA1C 5 7 (H) 07/12/2022      Lab Results   Component Value Date    HEPCAB Non-reactive 05/23/2018      Most Recent Immunizations Administered Date(s) Administered   • COVID-19 PFIZER VACCINE 0 3 ML IM 12/03/2021   • INFLUENZA 09/14/2018   • Influenza Quadrivalent Preservative Free 3 years and older IM 10/03/2016   • Influenza Quadrivalent, 6-35 Months IM 12/14/2017   • Influenza, recombinant, quadrivalent,injectable, preservative free 10/14/2021   • Influenza, seasonal, injectable 11/04/2014   • Pneumococcal Polysaccharide PPV23 06/25/2018   • Tdap 07/31/2016 06/20/2019 07/12/2022 No components found for: HIV1X2     HCV negative  HIV ordered  TSH ordered  Needs c scope in 2024  Non smoker  UTD on vaccines, refuses zoster  CBC, CMP, lipids in 2022    BMI Counseling: Body mass index is 27 37 kg/m²  The BMI is above normal  Nutrition recommendations include decreasing portion sizes, encouraging healthy choices of fruits and vegetables, decreasing fast food intake, consuming healthier snacks, limiting drinks that contain sugar, moderation in carbohydrate intake, increasing intake of lean protein, reducing intake of saturated and trans fat and reducing intake of cholesterol  Exercise recommendations include moderate physical activity 150 minutes/week  Rationale for BMI follow-up plan is due to patient being overweight or obese  Depression Screening and Follow-up Plan: Clincally patient does not have depression  No treatment is required  Schedule a follow-up appointment in 6 months  Chief Complaint: F/u chronic conditions  Subjective     History of Present Illness:  Patient is a 61 y o  M with a PMHx of prediabetes, hypothyroidism, vit d deficiency, vit b deficiency, HLD that presents for follow-up  Feeling well  No compliants  Compliant with meds  Working on diet and exercise  HM as above  Review of Systems   Constitutional: Negative for chills and fatigue  HENT: Negative for congestion, postnasal drip, rhinorrhea and sinus pain  Eyes: Negative for pain  Respiratory: Negative for cough and shortness of breath  Cardiovascular: Negative for chest pain and leg swelling  Gastrointestinal: Negative for abdominal distention and abdominal pain  Genitourinary: Negative for dysuria  Musculoskeletal: Negative for arthralgias and joint swelling  Neurological: Negative for headaches  Hematological: Negative for adenopathy  Psychiatric/Behavioral: Negative for agitation and confusion  Current Outpatient Medications:   •  acetaminophen (TYLENOL) 325 mg tablet, TAKE 2 TABLETS (650 MG TOTAL) BY MOUTH EVERY 6 (SIX) HOURS AS NEEDED FOR MILD PAIN, Disp: 30 tablet, Rfl: 0  •  brimonidine (ALPHAGAN P) 0 15 % ophthalmic solution, Apply 1 drop to eye 2 (two) times a day, Disp: , Rfl:   •  carbamide peroxide (DEBROX) 6 5 % otic solution, Administer 5 drops into both ears 2 (two) times a day, Disp: 15 mL, Rfl: 2  •  Cholecalciferol (Vitamin D3) 50 MCG (2000 UT) capsule, TAKE 1 CAPSULE (2,000 UNITS TOTAL) BY MOUTH DAILY, Disp: 90 capsule, Rfl: 3  •  Diclofenac Sodium (VOLTAREN) 1 %, APPLY 2 G TOPICALLY 4 (FOUR) TIMES A DAY, Disp: 100 g, Rfl: 2  •  fluticasone (FLONASE) 50 mcg/act nasal spray, PLACE 1 SPRAY INTO EACH NOSTRIL DAILY, Disp: 16 g, Rfl: 3  •  latanoprost (XALATAN) 0 005 % ophthalmic solution, Apply 1 drop to eye daily, Disp: , Rfl:   •  levothyroxine 125 mcg tablet, TAKE 1 TABLET (125 MCG TOTAL) BY MOUTH DAILY, Disp: 90 tablet, Rfl: 1  •  loratadine (CLARITIN) 10 mg tablet, TAKE 1 TABLET (10 MG TOTAL) BY MOUTH DAILY, Disp: 90 tablet, Rfl: 3  •  LORazepam (ATIVAN) 1 mg tablet, Take 1 mg by mouth daily, Disp: , Rfl:   •  omeprazole (PriLOSEC) 40 MG capsule, TAKE 1 CAPSULE (40 MG TOTAL) BY MOUTH DAILY, Disp: 90 capsule, Rfl: 3  •  QUEtiapine (SEROquel) 100 mg tablet, Take 100 mg by mouth daily at bedtime  , Disp: , Rfl:   •  simethicone (MYLICON,GAS-X) 618 MG CAPS, TAKE 1 CAPSULE (125 MG TOTAL) BY MOUTH EVERY 8 (EIGHT) HOURS AS NEEDED FOR FLATULENCE (STOMACH GAS), Disp: 90 capsule, Rfl: 1  •  timolol (TIMOPTIC) 0 5 % ophthalmic solution, Apply 1 drop to eye every morning, Disp: 10 mL, Rfl: 3  •  traZODone (DESYREL) 100 mg tablet, Take 1 tablet (100 mg total) by mouth daily at bedtime, Disp: 30 tablet, Rfl: 1  •  vitamin B-12 (CYANOCOBALAMIN) 250 MCG tablet, Take 1 tablet (250 mcg total) by mouth daily, Disp: 90 tablet, Rfl: 3  •  aspirin (ECOTRIN LOW STRENGTH) 81 mg EC tablet, TAKE 1 TABLET (81 MG TOTAL) BY MOUTH DAILY (Patient not taking: Reported on 12/8/2022), Disp: 30 tablet, Rfl: 2  •  polyethylene glycol (GLYCOLAX) 17 GM/SCOOP powder, Take 17 g by mouth daily (Patient not taking: Reported on 12/8/2022), Disp: 510 g, Rfl: 2  Past Medical History:   Diagnosis Date   • Anxiety    • Bipolar disorder ([facility] [address] )    • Cardiomyopathy ([facility] [address] )    • Depression    • GERD (gastroesophageal reflux disease)    • Glaucoma    • Hiatal hernia    • Hyperlipidemia    • Hyperthyroidism    • Thyroid disease      Past Surgical History:   Procedure Laterality Date   • CHOLECYSTECTOMY     • CHOLECYSTECTOMY LAPAROSCOPIC      last assessed: 4/23/15   • COLONOSCOPY     • EYE SURGERY     • VT COLONOSCOPY FLX DX W/COLLJ SPEC WHEN PFRMD N/A 4/4/2019    Procedure: COLONOSCOPY;  Surgeon: Cookie Holland MD;  Location: [facility]; Service: Gastroenterology   • VT ESOPHAGOGASTRODUODENOSCOPY TRANSORAL DIAGNOSTIC N/A 4/4/2019    Procedure: ESOPHAGOGASTRODUODENOSCOPY (EGD); Surgeon: Cookie Holland MD;  Location: [facility];   Service: Gastroenterology   • UPPER GASTROINTESTINAL ENDOSCOPY       Social History     Socioeconomic History   • Marital status: Single     Spouse name: Not on file   • Number of children: Not on file   • Years of education: Not on file   • Highest education level: Not on file   Occupational History   • Not on file   Tobacco Use   • Smoking status: Never   • Smokeless tobacco: Never   Vaping Use   • Vaping Use: Never used   Substance and Sexual Activity   • Alcohol use: No   • Drug use: No   • Sexual activity: Not Currently   Other Topics Concern • Not on file   Social History Narrative    Occasional caffeine use    Sedentary lifestyle     Social Determinants of Health     Financial Resource Strain: Low Risk    • Difficulty of Paying Living Expenses: Not hard at all   Food Insecurity: No Food Insecurity   • Worried About Running Out of Food in the Last Year: Never true   • Ran Out of Food in the Last Year: Never true   Transportation Needs: No Transportation Needs   • Lack of Transportation (Medical): No   • Lack of Transportation (Non-Medical): No   Physical Activity: Not on file   Stress: Not on file   Social Connections: Not on file   Intimate Partner Violence: Not on file   Housing Stability: Low Risk    • Unable to Pay for Housing in the Last Year: No   • Number of Places Lived in the Last Year: 1   • Unstable Housing in the Last Year: No     Family History   Problem Relation Age of Onset   • Diabetes Mother    • Hyperlipidemia Mother    • Thyroid cancer Mother    • Dementia Father    • Coronary artery disease Maternal Grandmother    • Hypertension Maternal Grandmother    • Heart attack Maternal Grandmother      No Known Allergies    Objective     Vitals:    12/08/22 1042   BP: 109/75   BP Location: Right arm   Patient Position: Sitting   Cuff Size: Standard   Pulse: 77   Temp: 98 6 °F (37 °C)   TempSrc: Temporal   SpO2: 97%   Weight: 81 6 kg (180 lb)       Physical exam:     Physical Exam  Vitals reviewed  Constitutional:       General: He is not in acute distress  HENT:      Head: Normocephalic and atraumatic  Right Ear: External ear normal       Left Ear: External ear normal       Nose: Nose normal       Mouth/Throat:      Mouth: Mucous membranes are moist       Pharynx: Oropharynx is clear  Eyes:      Extraocular Movements: Extraocular movements intact  Pupils: Pupils are equal, round, and reactive to light  Cardiovascular:      Rate and Rhythm: Normal rate and regular rhythm  Pulses: Normal pulses        Heart sounds: Normal heart sounds  Pulmonary:      Effort: Pulmonary effort is normal       Breath sounds: Normal breath sounds  Abdominal:      General: Abdomen is flat  Bowel sounds are normal  There is no distension  Tenderness: There is no abdominal tenderness  Musculoskeletal:      Right lower leg: No edema  Left lower leg: No edema  Skin:     General: Skin is warm and dry  Capillary Refill: Capillary refill takes less than 2 seconds  Neurological:      General: No focal deficit present  Mental Status: He is alert and oriented to person, place, and time  Psychiatric:         Mood and Affect: Mood normal          Behavior: Behavior normal            ==  PLEASE NOTE:  This encounter was completed utilizing the [employer]/Fluency Direct Speech Voice Recognition Software  Grammatical errors, random word insertions, pronoun errors and incomplete sentences are occasional consequences of the system due to software limitations, ambient noise and hardware issues  These may be missed by proof reading prior to affixing electronic signature  Any questions or concerns about the content, text or information contained within the body of this dictation should be directly addressed to the physician for clarification  Please do not hesitate to call me directly if you have any any questions or concerns      Morris Siegel DO, [address]  PGY-3, Internal Medicine  [facility]

## 2022-12-08 NOTE — PATIENT INSTRUCTIONS
La prediabetes   CUIDADO AMBULATORIO:   La prediabetes es un nivel de glucosa (azúcar) en la mariusz que es más alto de lo normal  No es lo suficientemente alto para ser considerado diabetes  La prediabetes aumenta el riesgo de padecer diabetes de tipo 2 y enfermedades cardíacas, especialmente si padece hipertensión arterial o colesterol alto  Lo siguiente puede aumentar sanz riesgo de prediabetes:  Tener sobrepeso u obesidad, con un índice de masa corporal ([address]) de 25 o más (21 o más si es asiático-americano)    Falta de actividad física    Edad avanzada    Antecedentes familiares de diabetes (padres o hermanos)    Antecedentes de enfermedad cardíaca, diabetes gestacional o síndrome de ovario poliquístico (SOPQ)    Presión arterial chaparrita o niveles altos de colesterol    Ser afroamericano, , nativo americano, asiático-americano o de las fuentes del [facility]    En los niños, tener cara madre con diabetes o diabetes gestacional (DG) boogie el embarazo    Signos y síntomas: Es posible que usted no presente ningún síntoma  Llame a sanz médico si:  Usted tiene [employer] o sed de lo usual     Usted está orinando con más frecuencia de lo normal     Siempre está exhausto  Usted tiene visión borrosa  Usted tiene preguntas o inquietudes acerca de sanz condición o cuidado  Prevenga o retrase la diabetes tipo 2: Las opciones saludables funcionan mejor para retrasar o prevenir la diabetes tipo 2  Es posible que sanz médico le dé las siguientes pautas:  Realice actividad física regularmente  Los adultos deben hacer al menos 150 minutos (2 5 horas) de actividad física moderada cada semana  Reparta la cantidad de actividad boogie al menos 3 días a la semana  No deje de realizarla boogie más de 2 días seguidos  Los niños deben hacer al menos 60 minutos de actividad física moderada la mayoría de los días de la [place]  Ejemplos de actividad física moderada incluyen caminar a paso ligero, correr y nadar   No permanezca sentada por más de 30 minutos cada vez  Colabore con sanz médico para crear un plan de [employer]  Baje de peso si usted tiene sobrepeso  Cara pérdida de peso del 7% de sanz peso corporal puede ayudar  Sanz médico puede indicarle cuál es el peso saludable para usted  Puede ayudarlo a crear un plan para perder peso  Consuma alimentos saludables  Consuma cara variedad de frutas y vegetales  Consuma alimentos de grano integral con más frecuencia  Escoja productos lácteos, meena y otras proteínas que clifton bajas en grasa  Consuma pocos dulces rebecca caramelos, galletas, sodas regulares y bebidas azucaradas  Usted también puede disminuir las calorías comiendo porciones pequeñas  Trabaje junto con sanz médico o dietista para desarrollar un plan de alimentación adecuado para usted  [place] los [employer] rebecca se le haya indicado  Sanz médico puede indicarle medicamentos para la diabetes si usted está en alto riesgo de tener diabetes  También puede necesitar medicamentos para la hipertensión y para el colesterol alto  Acuda a henrry consultas de control con sanz médico según le indicaron  Usted necesitará regresar cada año para que le realicen pruebas de diabetes  No fume  No use cigarrillos electrónicos o tabaco sin humo en vez de cigarrillos o para tratar de dejar de fumar  Todos estos aún contienen nicotina  Pida a sanz médico información si usted fuma actualmente y [place] para dejar de hacerlo  Empiece con objetivos pequeños y [place]  Es posible que tenga que [address] [facility] con 3 ramiro de [place]  Puede añadir un día después de 3 semanas de actividad más o [place]  Ponga rebecca objetivo perder 5 libras al principio  Cambie el postre o los dulces por cara Leonid Primmer  Empiece con 2 frutas a la semana y [employer]  Estas son sugerencias  Hable con henrry médicos sobre preparar un plan adecuado para usted      Acuda a la consulta de control con sanz médico según las indicaciones: Si tiene prediabetes, necesitará regresar cada año para que le realicen la prueba de diabetes  Anote henrry preguntas para que se acuerde de hacerlas obogie henrry visitas  © Copyright [employer] 2022 Information is for End User's use only and may not be sold, redistributed or otherwise used for commercial purposes  All illustrations and images included in CareNotes® are the copyrighted property of [employer] , [facility]  or [address] [place] [address] es sólo para uso en educación  Sanz intención no es darle un consejo médico sobre enfermedades o tratamientos  Colsulte con sanz [place] farmacéutico antes de seguir cualquier régimen médico para saber si es seguro y efectivo para usted

## 2023-03-24 DIAGNOSIS — Z23 NEED FOR PNEUMOCOCCAL VACCINATION: ICD-10-CM

## 2023-04-03 DIAGNOSIS — Z23 NEED FOR PNEUMOCOCCAL VACCINATION: ICD-10-CM

## 2023-04-03 RX ORDER — ACETAMINOPHEN 325 MG/1
650 TABLET ORAL EVERY 6 HOURS PRN
Qty: 90 TABLET | Refills: 0 | Status: SHIPPED | OUTPATIENT
Start: 2023-04-03

## 2023-04-06 ENCOUNTER — OFFICE VISIT (OUTPATIENT)
Dept: INTERNAL MEDICINE CLINIC | Facility: CLINIC | Age: 58
End: 2023-04-06

## 2023-04-06 VITALS
HEART RATE: 97 BPM | DIASTOLIC BLOOD PRESSURE: 73 MMHG | HEIGHT: 68 IN | SYSTOLIC BLOOD PRESSURE: 110 MMHG | BODY MASS INDEX: 26.98 KG/M2 | TEMPERATURE: 98.1 F | WEIGHT: 178 LBS

## 2023-04-06 DIAGNOSIS — F31.9 BIPOLAR AFFECTIVE DISORDER, REMISSION STATUS UNSPECIFIED (HCC): ICD-10-CM

## 2023-04-06 DIAGNOSIS — J30.9 ALLERGIC RHINITIS: ICD-10-CM

## 2023-04-06 DIAGNOSIS — E55.9 VITAMIN D DEFICIENCY: ICD-10-CM

## 2023-04-06 DIAGNOSIS — Z00.00 MEDICARE ANNUAL WELLNESS VISIT, SUBSEQUENT: Primary | ICD-10-CM

## 2023-04-06 DIAGNOSIS — E03.9 HYPOTHYROIDISM: ICD-10-CM

## 2023-04-06 DIAGNOSIS — Z78.9 HISTORY OF EXCESSIVE CERUMEN: ICD-10-CM

## 2023-04-06 RX ORDER — QUETIAPINE FUMARATE 100 MG/1
100 TABLET, FILM COATED ORAL
Qty: 30 TABLET | Refills: 0 | Status: SHIPPED | OUTPATIENT
Start: 2023-04-06

## 2023-04-06 RX ORDER — MELATONIN
1000 DAILY
Qty: 30 TABLET | Refills: 5 | Status: SHIPPED | OUTPATIENT
Start: 2023-04-06 | End: 2023-10-03

## 2023-04-06 RX ORDER — LEVOTHYROXINE SODIUM 0.12 MG/1
125 TABLET ORAL DAILY
Qty: 90 TABLET | Refills: 3 | Status: SHIPPED | OUTPATIENT
Start: 2023-04-06

## 2023-04-06 RX ORDER — FLUTICASONE PROPIONATE 50 MCG
2 SPRAY, SUSPENSION (ML) NASAL DAILY
Qty: 16 G | Refills: 3 | Status: SHIPPED | OUTPATIENT
Start: 2023-04-06

## 2023-04-06 NOTE — PROGRESS NOTES
Assessment and Plan:     Problem List Items Addressed This Visit        Endocrine    Hypothyroidism  · Encouraged him to complete lab work  · Continue Levothyroxine 125 mcg qd       Respiratory    Allergic rhinitis  Refill flonase       Other    Bipolar disorder ([address] )  Pt previously dx with bipolar and schizophrenia previously  Managed by Dr Fontenot Del psychiatry  However, practice has closed  Refill quetiapine and refer to him to psychiatry  Other Visit Diagnoses     Medicare annual wellness visit, subsequent    -  Primary  UTD on colonoscopy  PHQ-9: 0  Not interested in influenza vaccine  Encouraged pt to have his lab work completed  F/up in 3-6 months  History of excessive cerumen        Vitamin D deficiency               Preventive health issues were discussed with patient, and age appropriate screening tests were ordered as noted in patient's After Visit Summary  Personalized health advice and appropriate referrals for health education or preventive services given if needed, as noted in patient's After Visit Summary  History of Present Illness:     Patient presents for a Medicare Wellness Visit    Pt here for AWV  He denies any complaints  Compliant with is medication  No tobacco use hx  No EtOH use  No IVDA use  Patient currently retired from a  in [employer]  Pt walks on treadmill several days a week for exercise  Patient Care Team:  Renita Donato DO as PCP - General (Internal Medicine)  MD Sweetie Cordoba MD as Endoscopist     Review of Systems:     Review of Systems   Constitutional: Negative for chills and fever  HENT: Negative for ear pain and sore throat  Eyes: Negative for pain and visual disturbance  Respiratory: Negative for cough and shortness of breath  Cardiovascular: Negative for chest pain and palpitations  Gastrointestinal: Negative for abdominal pain and vomiting  Genitourinary: Negative for dysuria and hematuria  Musculoskeletal: Negative for arthralgias and back pain  Skin: Negative for color change and rash  Neurological: Negative for seizures and syncope  All other systems reviewed and are negative  Problem List:     Patient Active Problem List   Diagnosis   • Constipation   • Allergic rhinitis   • Bilateral knee pain   • Bilateral low back pain without sciatica   • Bipolar disorder ([facility] )   • Cataract, left eye   • Depression with anxiety   • Glaucoma of both eyes   • Hypertriglyceridemia   • Hypothyroidism   • Epigastric abdominal pain   • Diastasis recti   • Low vitamin B12 level   • Hematochezia   • Melena   • Gastroesophageal reflux disease without esophagitis   • Tubular adenoma   • Sebaceous cyst   • Epidermoid cyst      Past Medical and Surgical History:     Past Medical History:   Diagnosis Date   • Anxiety    • Bipolar disorder ([facility] )    • Cardiomyopathy ([place] [id number] )    • Depression    • GERD (gastroesophageal reflux disease)    • Glaucoma    • Hiatal hernia    • Hyperlipidemia    • Hyperthyroidism    • Thyroid disease      Past Surgical History:   Procedure Laterality Date   • CHOLECYSTECTOMY     • CHOLECYSTECTOMY LAPAROSCOPIC      last assessed: 4/23/15   • COLONOSCOPY     • EYE SURGERY     • WY COLONOSCOPY FLX DX W/COLLJ SPEC WHEN PFRMD N/A 4/4/2019    Procedure: COLONOSCOPY;  Surgeon: Dann Medellin MD;  Location: [facility]; Service: Gastroenterology   • WY ESOPHAGOGASTRODUODENOSCOPY TRANSORAL DIAGNOSTIC N/A 4/4/2019    Procedure: ESOPHAGOGASTRODUODENOSCOPY (EGD); Surgeon: Dann Medellin MD;  Location: [facility];   Service: Gastroenterology   • UPPER GASTROINTESTINAL ENDOSCOPY        Family History:     Family History   Problem Relation Age of Onset   • Diabetes Mother    • Hyperlipidemia Mother    • Thyroid cancer Mother    • Dementia Father    • Coronary artery disease Maternal Grandmother    • Hypertension Maternal Grandmother    • Heart attack Maternal Grandmother       Social History:     Social History     Socioeconomic History   • Marital status: Single     Spouse name: None   • Number of children: None   • Years of education: None   • Highest education level: None   Occupational History   • None   Tobacco Use   • Smoking status: Never   • Smokeless tobacco: Never   Vaping Use   • Vaping Use: Never used   Substance and Sexual Activity   • Alcohol use: No   • Drug use: No   • Sexual activity: Not Currently   Other Topics Concern   • None   Social History Narrative    Occasional caffeine use    Sedentary lifestyle     Social Determinants of Health     Financial Resource Strain: Low Risk    • Difficulty of Paying Living Expenses: Not hard at all   Food Insecurity: No Food Insecurity   • Worried About Running Out of Food in the Last Year: Never true   • Ran Out of Food in the Last Year: Never true   Transportation Needs: No Transportation Needs   • Lack of Transportation (Medical): No   • Lack of Transportation (Non-Medical):  No   Physical Activity: Not on file   Stress: Not on file   Social Connections: Not on file   Intimate Partner Violence: Not on file   Housing Stability: Low Risk    • Unable to Pay for Housing in the Last Year: No   • Number of Places Lived in the Last Year: 1   • Unstable Housing in the Last Year: No      Medications and Allergies:     Current Outpatient Medications   Medication Sig Dispense Refill   • levothyroxine 125 mcg tablet TAKE 1 TABLET (125 MCG TOTAL) BY MOUTH DAILY 90 tablet 3   • acetaminophen (TYLENOL) 325 mg tablet TAKE 2 TABLETS (650 MG TOTAL) BY MOUTH EVERY 6 (SIX) HOURS AS NEEDED FOR MILD PAIN 90 tablet 0   • atorvastatin (LIPITOR) 20 mg tablet Take 1 tablet (20 mg total) by mouth daily 30 tablet 5   • brimonidine (ALPHAGAN P) 0 15 % ophthalmic solution Apply 1 drop to eye 2 (two) times a day     • carbamide peroxide (DEBROX) 6 5 % otic solution Administer 5 drops into both ears 2 (two) times a day 15 mL 2   • cholecalciferol (VITAMIN D3) 1,000 units tablet Take 1 tablet (1,000 Units total) by mouth daily 30 tablet 5   • Cholecalciferol (Vitamin D3) 50 MCG (2000 UT) capsule TAKE 1 CAPSULE (2,000 UNITS TOTAL) BY MOUTH DAILY 90 capsule 3   • Diclofenac Sodium (VOLTAREN) 1 % APPLY 2 G TOPICALLY 4 (FOUR) TIMES A  g 2   • fluticasone (FLONASE) 50 mcg/act nasal spray PLACE 1 SPRAY INTO EACH NOSTRIL DAILY 16 g 3   • latanoprost (XALATAN) 0 005 % ophthalmic solution Apply 1 drop to eye daily     • loratadine (CLARITIN) 10 mg tablet TAKE 1 TABLET (10 MG TOTAL) BY MOUTH DAILY 90 tablet 3   • LORazepam (ATIVAN) 1 mg tablet Take 1 mg by mouth daily     • omeprazole (PriLOSEC) 40 MG capsule TAKE 1 CAPSULE (40 MG TOTAL) BY MOUTH DAILY 90 capsule 3   • QUEtiapine (SEROquel) 100 mg tablet Take 100 mg by mouth daily at bedtime  • simethicone (MYLICON,GAS-X) 161 MG CAPS TAKE 1 CAPSULE (125 MG TOTAL) BY MOUTH EVERY 8 (EIGHT) HOURS AS NEEDED FOR FLATULENCE (STOMACH GAS) 90 capsule 1   • timolol (TIMOPTIC) 0 5 % ophthalmic solution Apply 1 drop to eye every morning 10 mL 3   • traZODone (DESYREL) 100 mg tablet TAKE 1 TABLET (100 MG TOTAL) BY MOUTH DAILY AT BEDTIME 90 tablet 1   • vitamin B-12 (CYANOCOBALAMIN) 250 MCG tablet Take 1 tablet (250 mcg total) by mouth daily 90 tablet 3     No current facility-administered medications for this visit       No Known Allergies   Immunizations:     Immunization History   Administered Date(s) Administered   • COVID-19 PFIZER VACCINE 0 3 ML IM 12/03/2021   • INFLUENZA 10/03/2016, 12/14/2017, 09/14/2018   • Influenza Quadrivalent Preservative Free 3 years and older IM 10/03/2016   • Influenza Quadrivalent, 6-35 Months IM 12/14/2017   • Influenza, recombinant, quadrivalent,injectable, preservative free 09/14/2018, 11/04/2019, 10/06/2020, 10/14/2021   • Influenza, seasonal, injectable 11/07/2013, 11/04/2014   • Pneumococcal Polysaccharide PPV23 06/25/2018   • Tdap 11/07/2013, 07/31/2016      Health Maintenance:         Topic Date Due   • HIV Screening Never done   • Colorectal Cancer Screening  06/20/2029   • Hepatitis C Screening  Completed         Topic Date Due   • Influenza Vaccine (1) 09/01/2022      Medicare Screening Tests and Risk Assessments:         Health Risk Assessment:   Patient rates overall health as very good  Patient feels that their physical health rating is same  Patient is very satisfied with their life  Eyesight was rated as same  Hearing was rated as same  Patient feels that their emotional and mental health rating is much better  Patients states they are sometimes angry  Patient states they are sometimes unusually tired/fatigued  Pain experienced in the last 7 days has been none  Patient states that he has experienced no weight loss or gain in last 6 months  Depression Screening:   PHQ-9 Score: 0      Fall Risk Screening: In the past year, patient has experienced: no history of falling in past year      Home Safety:  Patient does not have trouble with stairs inside or outside of their home  Patient has working smoke alarms and has working carbon monoxide detector  Home safety hazards include: none  Nutrition:   Current diet is Regular  Medications:   Patient is not currently taking any over-the-counter supplements  Patient is able to manage medications  Activities of Daily Living (ADLs)/Instrumental Activities of Daily Living (IADLs):   Walk and transfer into and out of bed and chair?: Yes  Dress and groom yourself?: Yes    Bathe or shower yourself?: Yes    Feed yourself?  Yes  Do your laundry/housekeeping?: Yes  Manage your money, pay your bills and track your expenses?: Yes  Make your own meals?: Yes    Do your own shopping?: Yes    Previous Hospitalizations:   Any hospitalizations or ED visits within the last 12 months?: No      Advance Care Planning:   Living will: No    Durable POA for healthcare: No      PREVENTIVE SCREENINGS      Cardiovascular Screening:    General: Screening Not Indicated and History Lipid "Disorder      Diabetes Screening:     General: Screening Current      Colorectal Cancer Screening:     General: Screening Current      Lung Cancer Screening:     General: Screening Not Indicated      Hepatitis C Screening:    General: Screening Current    Screening, Brief Intervention, and Referral to Treatment (SBIRT)    Screening  Typical number of drinks in a day: 0  Typical number of drinks in a week: 0  Interpretation: Low risk drinking behavior  Single Item Drug Screening:  How often have you used an illegal drug (including marijuana) or a prescription medication for non-medical reasons in the past year? never    Single Item Drug Screen Score: 0  Interpretation: Negative screen for possible drug use disorder    Other Counseling Topics:   Car/seat belt/driving safety, sunscreen and regular weightbearing exercise  No results found  Physical Exam:     /73 (BP Location: Right arm, Patient Position: Sitting, Cuff Size: Large)   Pulse 97   Temp 98 1 °F (36 7 °C) (Temporal)   Ht 5' 8\" (1 727 m)   Wt 80 7 kg (178 lb)   BMI 27 06 kg/m²     Physical Exam  Constitutional:       General: He is not in acute distress  HENT:      Head: Normocephalic and atraumatic  Nose: Nose normal       Mouth/Throat:      Mouth: Mucous membranes are moist    Eyes:      General:         Right eye: No discharge  Left eye: No discharge  Extraocular Movements: Extraocular movements intact  Comments: Injected conjunctiva    Cardiovascular:      Rate and Rhythm: Normal rate and regular rhythm  Pulses: Normal pulses  Pulmonary:      Effort: Pulmonary effort is normal    Abdominal:      General: Abdomen is flat  Bowel sounds are normal  There is no distension  Palpations: Abdomen is soft  Tenderness: There is no abdominal tenderness  There is no guarding  Musculoskeletal:      Right lower leg: No edema  Left lower leg: No edema  Skin:     General: Skin is warm and dry        " Capillary Refill: Capillary refill takes less than 2 seconds  Neurological:      General: No focal deficit present  Mental Status: He is alert and oriented to person, place, and time  Mental status is at baseline  Psychiatric:         Mood and Affect: Mood normal          Behavior: Behavior normal          Thought Content:  Thought content normal          Judgment: Judgment normal           Leticia Dupont DO

## 2023-04-06 NOTE — PATIENT INSTRUCTIONS
Please schedule f/up on chronic conditions in the next 4-6 months  Medicare Preventive Visit Patient Instructions  Thank you for completing your Welcome to Medicare Visit or Medicare Annual Wellness Visit today  Your next wellness visit will be due in one year (4/6/2024)  The screening/preventive services that you may require over the next 5-10 years are detailed below  Some tests may not apply to you based off risk factors and/or age  Screening tests ordered at today's visit but not completed yet may show as past due  Also, please note that scanned in results may not display below  Preventive Screenings:  Service Recommendations Previous Testing/Comments   Colorectal Cancer Screening  Colonoscopy    Fecal Occult Blood Test (FOBT)/Fecal Immunochemical Test (FIT)  Fecal DNA/Cologuard Test  Flexible Sigmoidoscopy Age: 39-70 years old   Colonoscopy: every 10 years (May be performed more frequently if at higher risk)  OR  FOBT/FIT: every 1 year  OR  Cologuard: every 3 years  OR  Sigmoidoscopy: every 5 years  Screening may be recommended earlier than age 39 if at higher risk for colorectal cancer  Also, an individualized decision between you and your healthcare provider will decide whether screening between the ages of 74-80 would be appropriate   Colonoscopy: 06/20/2019  FOBT/FIT: Not on file  Cologuard: Not on file  Sigmoidoscopy: Not on file          Prostate Cancer Screening Individualized decision between patient and health care provider in men between ages of 53-78   Medicare will cover every 12 months beginning on the day after your 50th birthday PSA: No results in last 5 years           Hepatitis C Screening Once for adults born between 1945 and 1965  More frequently in patients at high risk for Hepatitis C Hep C Antibody: 05/23/2018        Diabetes Screening 1-2 times per year if you're at risk for diabetes or have pre-diabetes Fasting glucose: 103 mg/dL (7/12/2022)  A1C: 5 7 % (7/12/2022)      Cholesterol Screening Once every 5 years if you don't have a lipid disorder  May order more often based on risk factors  Lipid panel: 07/12/2022         Other Preventive Screenings Covered by Medicare:  Abdominal Aortic Aneurysm (AAA) Screening: covered once if your at risk  You're considered to be at risk if you have a family history of AAA or a male between the age of 73-68 who smoking at least 100 cigarettes in your lifetime  Lung Cancer Screening: covers low dose CT scan once per year if you meet all of the following conditions: (1) Age 50-69; (2) No signs or symptoms of lung cancer; (3) Current smoker or have quit smoking within the last 15 years; (4) You have a tobacco smoking history of at least 20 pack years (packs per day x number of years you smoked); (5) You get a written order from a healthcare provider  Glaucoma Screening: covered annually if you're considered high risk: (1) You have diabetes OR (2) Family history of glaucoma OR (3)  aged 48 and older OR (3)  American aged 72 and older  Osteoporosis Screening: covered every 2 years if you meet one of the following conditions: (1) Have a vertebral abnormality; (2) On glucocorticoid therapy for more than 3 months; (3) Have primary hyperparathyroidism; (4) On osteoporosis medications and need to assess response to drug therapy  HIV Screening: covered annually if you're between the age of 12-76  Also covered annually if you are younger than 13 and older than 72 with risk factors for HIV infection  For pregnant patients, it is covered up to 3 times per pregnancy      Immunizations:  Immunization Recommendations   Influenza Vaccine Annual influenza vaccination during flu season is recommended for all persons aged >= 6 months who do not have contraindications   Pneumococcal Vaccine   * Pneumococcal conjugate vaccine = PCV13 (Prevnar 13), PCV15 (Vaxneuvance), PCV20 (Prevnar 20)  * Pneumococcal polysaccharide vaccine = PPSV23 (Pneumovax) Adults 25-60 years old: 1-3 doses may be recommended based on certain risk factors  Adults 72 years old: 1-2 doses may be recommended based off what pneumonia vaccine you previously received   Hepatitis B Vaccine 3 dose series if at intermediate or high risk (ex: diabetes, end stage renal disease, liver disease)   Tetanus (Td) Vaccine - COST NOT COVERED BY MEDICARE PART B Following completion of primary series, a booster dose should be given every 10 years to maintain immunity against tetanus  Td may also be given as tetanus wound prophylaxis  Tdap Vaccine - COST NOT COVERED BY MEDICARE PART B Recommended at least once for all adults  For pregnant patients, recommended with each pregnancy  Shingles Vaccine (Shingrix) - COST NOT COVERED BY MEDICARE PART B  2 shot series recommended in those aged 48 and above     Health Maintenance Due:      Topic Date Due    HIV Screening  Never done    Colorectal Cancer Screening  06/20/2029    Hepatitis C Screening  Completed     Immunizations Due:      Topic Date Due    Influenza Vaccine (1) 09/01/2022     Advance Directives   What are advance directives? Advance directives are legal documents that state your wishes and plans for medical care  These plans are made ahead of time in case you lose your ability to make decisions for yourself  Advance directives can apply to any medical decision, such as the treatments you want, and if you want to donate organs  What are the types of advance directives? There are many types of advance directives, and each state has rules about how to use them  You may choose a combination of any of the following:  Living will: This is a written record of the treatment you want  You can also choose which treatments you do not want, which to limit, and which to stop at a certain time  This includes surgery, medicine, IV fluid, and tube feedings  Durable power of  for healthcare [facility]):   This is a written record that states who you want to make healthcare choices for you when you are unable to make them for yourself  This person, called a proxy, is usually a family member or a friend  You may choose more than 1 proxy  Do not resuscitate (DNR) order:  A DNR order is used in case your heart stops beating or you stop breathing  It is a request not to have certain forms of treatment, such as CPR  A DNR order may be included in other types of advance directives  Medical directive: This covers the care that you want if you are in a coma, near death, or unable to make decisions for yourself  You can list the treatments you want for each condition  Treatment may include pain medicine, surgery, blood transfusions, dialysis, IV or tube feedings, and a ventilator (breathing machine)  Values history: This document has questions about your views, beliefs, and how you feel and think about life  This information can help others choose the care that you would choose  Why are advance directives important? An advance directive helps you control your care  Although spoken wishes may be used, it is better to have your wishes written down  Spoken wishes can be misunderstood, or not followed  Treatments may be given even if you do not want them  An advance directive may make it easier for your family to make difficult choices about your care  Weight Management   Why it is important to manage your weight:  Being overweight increases your risk of health conditions such as heart disease, high blood pressure, type 2 diabetes, and certain types of cancer  It can also increase your risk for osteoarthritis, sleep apnea, and other respiratory problems  Aim for a slow, steady weight loss  Even a small amount of weight loss can lower your risk of health problems  How to lose weight safely:  A safe and healthy way to lose weight is to eat fewer calories and get regular exercise   You can lose up about 1 pound a week by decreasing the number of calories you eat by 500 calories each day  Healthy meal plan for weight management:  A healthy meal plan includes a variety of foods, contains fewer calories, and helps you stay healthy  A healthy meal plan includes the following:  Eat whole-grain foods more often  A healthy meal plan should contain fiber  Fiber is the part of grains, fruits, and vegetables that is not broken down by your body  Whole-grain foods are healthy and provide extra fiber in your diet  Some examples of whole-grain foods are whole-wheat breads and pastas, oatmeal, brown rice, and bulgur  Eat a variety of vegetables every day  Include dark, leafy greens such as spinach, kale, cipriano greens, and mustard greens  Eat yellow and orange vegetables such as carrots, sweet potatoes, and winter squash  Eat a variety of fruits every day  Choose fresh or canned fruit (canned in its own juice or light syrup) instead of juice  Fruit juice has very little or no fiber  Eat low-fat dairy foods  Drink fat-free (skim) milk or 1% milk  Eat fat-free yogurt and low-fat cottage cheese  Try low-fat cheeses such as mozzarella and other reduced-fat cheeses  Choose meat and other protein foods that are low in fat  Choose beans or other legumes such as split peas or lentils  Choose fish, skinless poultry (chicken or turkey), or lean cuts of red meat (beef or pork)  Before you cook meat or poultry, cut off any visible fat  Use less fat and oil  Try baking foods instead of frying them  Add less fat, such as margarine, sour cream, regular salad dressing and mayonnaise to foods  Eat fewer high-fat foods  Some examples of high-fat foods include french fries, doughnuts, ice cream, and cakes  Eat fewer sweets  Limit foods and drinks that are high in sugar  This includes candy, cookies, regular soda, and sweetened drinks  Exercise:  Exercise at least 30 minutes per day on most days of the week  Some examples of exercise include walking, biking, dancing, and swimming   You can also fit in more physical activity by taking the stairs instead of the elevator or parking farther away from stores  Ask your healthcare provider about the best exercise plan for you  © Copyright [employer] 2018 Information is for End User's use only and may not be sold, redistributed or otherwise used for commercial purposes   All illustrations and images included in CareNotes® are the copyrighted property of A D A M [employer]  or [address] [place] [address]

## 2023-04-22 DIAGNOSIS — Z23 NEED FOR PNEUMOCOCCAL VACCINATION: ICD-10-CM

## 2023-05-09 ENCOUNTER — TELEPHONE (OUTPATIENT)
Dept: PSYCHIATRY | Facility: CLINIC | Age: 58
End: 2023-05-09

## 2023-05-10 NOTE — TELEPHONE ENCOUNTER
Patient contacted the office returning previous vm left  The patient requested a [place] speaking staff member to assist  Writer attempted to connect with the  line but the call was then disconnected by the patient

## 2023-05-23 DIAGNOSIS — Z23 NEED FOR PNEUMOCOCCAL VACCINATION: ICD-10-CM

## 2023-06-20 DIAGNOSIS — F31.9 BIPOLAR AFFECTIVE DISORDER, REMISSION STATUS UNSPECIFIED (HCC): ICD-10-CM

## 2023-06-20 RX ORDER — QUETIAPINE FUMARATE 100 MG/1
TABLET, FILM COATED ORAL
Qty: 30 TABLET | Refills: 1 | Status: SHIPPED | OUTPATIENT
Start: 2023-06-20

## 2023-06-22 DIAGNOSIS — Z23 NEED FOR PNEUMOCOCCAL VACCINATION: ICD-10-CM

## 2023-07-14 DIAGNOSIS — Z23 NEED FOR PNEUMOCOCCAL VACCINATION: ICD-10-CM

## 2023-07-14 RX ORDER — TRAZODONE HYDROCHLORIDE 100 MG/1
TABLET ORAL
Qty: 90 TABLET | Refills: 1 | Status: SHIPPED | OUTPATIENT
Start: 2023-07-14

## 2023-07-15 DIAGNOSIS — J30.9 ALLERGIC RHINITIS: ICD-10-CM

## 2023-07-17 RX ORDER — FLUTICASONE PROPIONATE 50 MCG
SPRAY, SUSPENSION (ML) NASAL
Qty: 16 G | Refills: 3 | Status: SHIPPED | OUTPATIENT
Start: 2023-07-17

## 2023-07-22 DIAGNOSIS — Z23 NEED FOR PNEUMOCOCCAL VACCINATION: ICD-10-CM

## 2023-08-09 DIAGNOSIS — F31.9 BIPOLAR AFFECTIVE DISORDER, REMISSION STATUS UNSPECIFIED (HCC): ICD-10-CM

## 2023-08-21 DIAGNOSIS — Z23 NEED FOR PNEUMOCOCCAL VACCINATION: ICD-10-CM

## 2023-09-04 DIAGNOSIS — E55.9 VITAMIN D DEFICIENCY: ICD-10-CM

## 2023-09-05 RX ORDER — VITAMIN B COMPLEX
TABLET ORAL
Qty: 30 TABLET | Refills: 5 | Status: SHIPPED | OUTPATIENT
Start: 2023-09-05

## 2023-09-22 DIAGNOSIS — Z23 NEED FOR PNEUMOCOCCAL VACCINATION: ICD-10-CM

## 2023-09-26 DIAGNOSIS — Z23 NEED FOR PNEUMOCOCCAL VACCINATION: ICD-10-CM

## 2023-09-27 DIAGNOSIS — Z23 NEED FOR PNEUMOCOCCAL VACCINATION: ICD-10-CM

## 2023-09-27 DIAGNOSIS — J30.9 ALLERGIC RHINITIS: ICD-10-CM

## 2023-09-27 RX ORDER — TRAZODONE HYDROCHLORIDE 100 MG/1
TABLET ORAL
Qty: 90 TABLET | Refills: 1 | Status: SHIPPED | OUTPATIENT
Start: 2023-09-27

## 2023-09-28 RX ORDER — FLUTICASONE PROPIONATE 50 MCG
SPRAY, SUSPENSION (ML) NASAL
Qty: 16 G | Refills: 3 | Status: SHIPPED | OUTPATIENT
Start: 2023-09-28

## 2023-10-20 RX ORDER — QUETIAPINE FUMARATE 100 MG/1
TABLET, FILM COATED ORAL
Qty: 30 TABLET | Refills: 1 | Status: SHIPPED | OUTPATIENT
Start: 2023-10-20

## 2023-11-11 DIAGNOSIS — Z23 NEED FOR PNEUMOCOCCAL VACCINATION: ICD-10-CM

## 2023-12-12 DIAGNOSIS — Z23 NEED FOR PNEUMOCOCCAL VACCINATION: ICD-10-CM

## 2024-01-08 DIAGNOSIS — J30.9 ALLERGIC RHINITIS: ICD-10-CM

## 2024-01-09 RX ORDER — FLUTICASONE PROPIONATE 50 MCG
SPRAY, SUSPENSION (ML) NASAL
Qty: 16 G | Refills: 3 | Status: SHIPPED | OUTPATIENT
Start: 2024-01-09

## 2024-01-11 DIAGNOSIS — Z23 NEED FOR PNEUMOCOCCAL VACCINATION: ICD-10-CM

## 2024-02-05 DIAGNOSIS — E55.9 VITAMIN D DEFICIENCY: ICD-10-CM

## 2024-02-05 RX ORDER — VITAMIN B COMPLEX
TABLET ORAL
Qty: 90 TABLET | Refills: 5 | Status: SHIPPED | OUTPATIENT
Start: 2024-02-05

## 2024-02-21 DIAGNOSIS — Z23 NEED FOR PNEUMOCOCCAL VACCINATION: ICD-10-CM

## 2024-03-22 DIAGNOSIS — Z23 NEED FOR PNEUMOCOCCAL VACCINATION: ICD-10-CM

## 2024-04-15 DIAGNOSIS — E78.00 PURE HYPERCHOLESTEROLEMIA: ICD-10-CM

## 2024-04-15 RX ORDER — ATORVASTATIN CALCIUM 20 MG/1
TABLET, FILM COATED ORAL
Qty: 30 TABLET | Refills: 0 | Status: SHIPPED | OUTPATIENT
Start: 2024-04-15

## 2024-04-22 DIAGNOSIS — J30.9 ALLERGIC RHINITIS: ICD-10-CM

## 2024-04-22 DIAGNOSIS — Z23 NEED FOR PNEUMOCOCCAL VACCINATION: ICD-10-CM

## 2024-04-22 RX ORDER — FLUTICASONE PROPIONATE 50 MCG
SPRAY, SUSPENSION (ML) NASAL
Qty: 16 G | Refills: 0 | Status: SHIPPED | OUTPATIENT
Start: 2024-04-22 | End: 2024-04-30 | Stop reason: SDUPTHER

## 2024-04-30 ENCOUNTER — PREP FOR PROCEDURE (OUTPATIENT)
Dept: GASTROENTEROLOGY | Facility: CLINIC | Age: 59
End: 2024-04-30

## 2024-04-30 ENCOUNTER — TELEPHONE (OUTPATIENT)
Dept: GASTROENTEROLOGY | Facility: CLINIC | Age: 59
End: 2024-04-30

## 2024-04-30 ENCOUNTER — OFFICE VISIT (OUTPATIENT)
Dept: INTERNAL MEDICINE CLINIC | Facility: CLINIC | Age: 59
End: 2024-04-30

## 2024-04-30 VITALS
OXYGEN SATURATION: 97 % | TEMPERATURE: 98.4 F | BODY MASS INDEX: 27.51 KG/M2 | SYSTOLIC BLOOD PRESSURE: 121 MMHG | WEIGHT: 180.9 LBS | DIASTOLIC BLOOD PRESSURE: 77 MMHG | HEART RATE: 87 BPM

## 2024-04-30 DIAGNOSIS — R79.89 LOW VITAMIN B12 LEVEL: ICD-10-CM

## 2024-04-30 DIAGNOSIS — J30.9 ALLERGIC RHINITIS: ICD-10-CM

## 2024-04-30 DIAGNOSIS — Z23 NEED FOR PNEUMOCOCCAL VACCINATION: ICD-10-CM

## 2024-04-30 DIAGNOSIS — R00.2 PALPITATIONS: Primary | ICD-10-CM

## 2024-04-30 DIAGNOSIS — Z86.010 HISTORY OF COLON POLYPS: Primary | ICD-10-CM

## 2024-04-30 DIAGNOSIS — E78.00 PURE HYPERCHOLESTEROLEMIA: ICD-10-CM

## 2024-04-30 DIAGNOSIS — R14.0 BLOATING: ICD-10-CM

## 2024-04-30 DIAGNOSIS — D36.9 TUBULAR ADENOMA: ICD-10-CM

## 2024-04-30 DIAGNOSIS — E03.9 HYPOTHYROIDISM: ICD-10-CM

## 2024-04-30 DIAGNOSIS — E03.9 HYPOTHYROIDISM, UNSPECIFIED TYPE: ICD-10-CM

## 2024-04-30 DIAGNOSIS — H40.9 GLAUCOMA OF BOTH EYES, UNSPECIFIED GLAUCOMA TYPE: ICD-10-CM

## 2024-04-30 DIAGNOSIS — E55.9 VITAMIN D DEFICIENCY: ICD-10-CM

## 2024-04-30 DIAGNOSIS — E78.1 HYPERTRIGLYCERIDEMIA: ICD-10-CM

## 2024-04-30 DIAGNOSIS — Z78.9 HISTORY OF EXCESSIVE CERUMEN: ICD-10-CM

## 2024-04-30 PROCEDURE — 99213 OFFICE O/P EST LOW 20 MIN: CPT | Performed by: STUDENT IN AN ORGANIZED HEALTH CARE EDUCATION/TRAINING PROGRAM

## 2024-04-30 PROCEDURE — 93000 ELECTROCARDIOGRAM COMPLETE: CPT | Performed by: STUDENT IN AN ORGANIZED HEALTH CARE EDUCATION/TRAINING PROGRAM

## 2024-04-30 RX ORDER — ACETAMINOPHEN 325 MG/1
650 TABLET ORAL EVERY 6 HOURS PRN
Qty: 90 TABLET | Refills: 0 | Status: SHIPPED | OUTPATIENT
Start: 2024-04-30

## 2024-04-30 RX ORDER — ATORVASTATIN CALCIUM 20 MG/1
20 TABLET, FILM COATED ORAL DAILY
Qty: 30 TABLET | Refills: 0 | Status: SHIPPED | OUTPATIENT
Start: 2024-04-30

## 2024-04-30 RX ORDER — SIMETHICONE 125 MG
125 CAPSULE ORAL EVERY 8 HOURS PRN
Qty: 90 CAPSULE | Refills: 1 | Status: SHIPPED | OUTPATIENT
Start: 2024-04-30

## 2024-04-30 RX ORDER — LEVOTHYROXINE SODIUM 0.12 MG/1
125 TABLET ORAL DAILY
Qty: 90 TABLET | Refills: 3 | Status: SHIPPED | OUTPATIENT
Start: 2024-04-30

## 2024-04-30 RX ORDER — BRIMONIDINE TARTRATE 1.5 MG/ML
1 SOLUTION/ DROPS OPHTHALMIC 2 TIMES DAILY
Qty: 10 ML | Refills: 5 | Status: SHIPPED | OUTPATIENT
Start: 2024-04-30

## 2024-04-30 RX ORDER — VITAMIN B COMPLEX
TABLET ORAL
Qty: 90 TABLET | Refills: 5 | Status: SHIPPED | OUTPATIENT
Start: 2024-04-30

## 2024-04-30 RX ORDER — FLUTICASONE PROPIONATE 50 MCG
SPRAY, SUSPENSION (ML) NASAL
Qty: 16 G | Refills: 0 | Status: SHIPPED | OUTPATIENT
Start: 2024-04-30

## 2024-04-30 RX ORDER — TRAZODONE HYDROCHLORIDE 100 MG/1
TABLET ORAL
Qty: 90 TABLET | Refills: 1 | Status: SHIPPED | OUTPATIENT
Start: 2024-04-30

## 2024-04-30 NOTE — PROGRESS NOTES
Name: Luis Manuel Moulton      : 1965      MRN: [id number]  Encounter Provider: Emily Gutierrez DO  Encounter Date: 2024   Encounter department: [facility]    Assessment & Plan     1. Palpitations  Assessment & Plan:  Patient endorses having a skipped beat sensation that started last week.  He reports he had 3 separate occasions since.  He reports this sensation will last under 30 seconds and will resolve on its own. Reports will happen at rest. Denies any associated chest pain/SOB.   Reports that he feels like his palpitations but not heart racing.  Denies acid reflux symptoms.  Reports he has not had this sensation for the past 3 days.  POCT EKG unremarkable. Will get order a holter monitor.    Orders:  -     Holter monitor; Future; Expected date: 2024  -     POCT ECG    2. Tubular adenoma  -     Ambulatory Referral to Gastroenterology; Future    3. Hypertriglyceridemia  -     Lipid panel; Future    4. Hypothyroidism, unspecified type  -     TSH + Free T4; Future  -     CBC and Platelet; Future  -     Comprehensive metabolic panel; Future  -     Prolactin; Future    5. Low vitamin B12 level  -     vitamin B-12 (CYANOCOBALAMIN) 250 MCG tablet; Take 1 tablet (250 mcg total) by mouth daily    6. Need for pneumococcal vaccination  -     traZODone (DESYREL) 100 mg tablet; AMRITA 1 TABLETA POR VIA ORAL DIARIAMENTE ANTES DE DORMIR EN LA NOCHE  -     acetaminophen (TYLENOL) 325 mg tablet; Take 2 tablets (650 mg total) by mouth every 6 (six) hours as needed for mild pain    7. Bloating  -     simethicone (MYLICON,GAS-X) 125 MG CAPS; Take 1 capsule (125 mg total) by mouth every 8 (eight) hours as needed for flatulence (stomach gas)    8. Hypothyroidism  -     levothyroxine 125 mcg tablet; Take 1 tablet (125 mcg total) by mouth daily    9. Allergic rhinitis  -     fluticasone (FLONASE) 50 mcg/act nasal spray; USE 2 SPRAYS INTO EACH NOSTRIL DAILY    10. Vitamin D  deficiency  -     cholecalciferol (VITAMIN D3) 25 mcg (1,000 units) tablet; AMRITA 1 TABLETA POR VIA ORAL DIARIAMENTE    11. Pure hypercholesterolemia  -     atorvastatin (LIPITOR) 20 mg tablet; Take 1 tablet (20 mg total) by mouth daily AMRITA 1 TABLETA POR VIA ORAL DIARIAMENTE    12. History of excessive cerumen  -     carbamide peroxide (DEBROX) 6.5 % otic solution; Administer 5 drops into both ears 2 (two) times a day    13. Glaucoma of both eyes, unspecified glaucoma type  -     brimonidine (ALPHAGAN P) 0.15 % ophthalmic solution; Apply 1 drop to eye 2 (two) times a day           Subjective      57 y.o. M with a PMHx of hypothyroidism, bipolar disorder, glaucoma, hyperlipidemia presents to the clinic for refill of his medications.  Patient endorses having a skipped beat sensation that started last week.  He reports she had 3 separate occasions since.  He reports his sensation will last under 30 seconds denies any chest pain.  Reports that he feels like his palpitations but not heart racing.  Denies acid reflux symptoms.  Reports she has not had this sensation for the past 3 days.  No history of CAD, family history significant for MI in grandma at age 70.  POCT EKG unremarkable.      Review of Systems   Constitutional:  Negative for chills and fever.   HENT:  Negative for ear pain and sore throat.    Eyes:  Negative for pain and visual disturbance.   Respiratory:  Negative for cough and shortness of breath.    Cardiovascular:  Positive for palpitations. Negative for chest pain.   Gastrointestinal:  Negative for abdominal pain and vomiting.   Genitourinary:  Negative for dysuria and hematuria.   Musculoskeletal:  Negative for arthralgias and back pain.   Skin:  Negative for color change and rash.   Neurological:  Negative for seizures and syncope.   All other systems reviewed and are negative.      Current Outpatient Medications on File Prior to Visit   Medication Sig    [DISCONTINUED] vitamin B-12 (CYANOCOBALAMIN)  250 MCG tablet Take 1 tablet (250 mcg total) by mouth daily    Diclofenac Sodium (VOLTAREN) 1 % APPLY 2 G TOPICALLY 4 (FOUR) TIMES A DAY    latanoprost (XALATAN) 0.005 % ophthalmic solution Apply 1 drop to eye daily    QUEtiapine (SEROquel) 100 mg tablet AMRITA 1 TABLETA POR VIA ORAL DIARIAMENTE ANTES DE DORMIR EN LA NOCHE    timolol (TIMOPTIC) 0.5 % ophthalmic solution Apply 1 drop to eye every morning    [DISCONTINUED] acetaminophen (TYLENOL) 325 mg tablet TAKE 2 TABLETS (650 MG TOTAL) BY MOUTH EVERY 6 (SIX) HOURS AS NEEDED FOR MILD PAIN    [DISCONTINUED] atorvastatin (LIPITOR) 20 mg tablet AMRITA 1 TABLETA POR VIA ORAL DIARIAMENTE    [DISCONTINUED] brimonidine (ALPHAGAN P) 0.15 % ophthalmic solution Apply 1 drop to eye 2 (two) times a day    [DISCONTINUED] carbamide peroxide (DEBROX) 6.5 % otic solution Administer 5 drops into both ears 2 (two) times a day    [DISCONTINUED] cholecalciferol (VITAMIN D3) 25 mcg (1,000 units) tablet AMRITA 1 TABLETA POR VIA ORAL DIARIAMENTE    [DISCONTINUED] fluticasone (FLONASE) 50 mcg/act nasal spray USE 2 SPRAYS INTO EACH NOSTRIL DAILY    [DISCONTINUED] levothyroxine 125 mcg tablet Take 1 tablet (125 mcg total) by mouth daily    [DISCONTINUED] simethicone (MYLICON,GAS-X) 125 MG CAPS TAKE 1 CAPSULE (125 MG TOTAL) BY MOUTH EVERY 8 (EIGHT) HOURS AS NEEDED FOR FLATULENCE (STOMACH GAS)    [DISCONTINUED] traZODone (DESYREL) 100 mg tablet AMRITA 1 TABLETA POR VIA ORAL DIARIAMENTE ANTES DE DORMIR EN LA NOCHE       Objective     /77 (BP Location: Left arm, Patient Position: Sitting, Cuff Size: Standard)   Pulse 87   Temp 98.4 °F (36.9 °C) (Temporal)   Wt 82.1 kg (180 lb 14.4 oz)   SpO2 97%   BMI 27.51 kg/m²     Physical Exam  Constitutional:       General: He is not in acute distress.     Appearance: Normal appearance. He is not ill-appearing.   HENT:      Head: Normocephalic and atraumatic.      Nose: Nose normal.      Mouth/Throat:      Mouth: Mucous membranes are moist.       Pharynx: Oropharynx is clear.   Eyes:      Conjunctiva/sclera: Conjunctivae normal.   Cardiovascular:      Rate and Rhythm: Normal rate and regular rhythm.      Heart sounds: Normal heart sounds.   Pulmonary:      Effort: Pulmonary effort is normal. No respiratory distress.      Breath sounds: Normal breath sounds.   Abdominal:      General: Abdomen is flat.      Palpations: Abdomen is soft.      Tenderness: There is no abdominal tenderness.   Musculoskeletal:         General: Normal range of motion.      Cervical back: Normal range of motion and neck supple.      Right lower leg: No edema.      Left lower leg: No edema.   Skin:     General: Skin is warm and dry.   Neurological:      General: No focal deficit present.      Mental Status: He is alert.   Psychiatric:         Mood and Affect: Mood normal.       Emily Gutierrez DO

## 2024-04-30 NOTE — TELEPHONE ENCOUNTER
PT PASSED OA IN REFERRAL / [facility].      Scheduled date of colonoscopy (as of today): 07/22/24  Physician performing colonoscopy: viviana  Location of colonoscopy: [facility]  Bowel prep reviewed with patient: M/D  Instructions reviewed with patient by: MAILED  Clearances: NONE

## 2024-05-01 NOTE — ASSESSMENT & PLAN NOTE
Patient endorses having a skipped beat sensation that started last week.  He reports he had 3 separate occasions since.  He reports this sensation will last under 30 seconds and will resolve on its own. Reports will happen at rest. Denies any associated chest pain/SOB.   Reports that he feels like his palpitations but not heart racing.  Denies acid reflux symptoms.  Reports he has not had this sensation for the past 3 days.  POCT EKG unremarkable. Will get order a holter monitor.

## 2024-05-03 ENCOUNTER — HOSPITAL ENCOUNTER (OUTPATIENT)
Dept: NON INVASIVE DIAGNOSTICS | Facility: HOSPITAL | Age: 59
Discharge: HOME | End: 2024-05-03
Payer: MEDICARE

## 2024-05-03 DIAGNOSIS — R00.2 PALPITATIONS: ICD-10-CM

## 2024-05-03 PROCEDURE — 93225 XTRNL ECG REC<48 HRS REC: CPT

## 2024-05-03 PROCEDURE — 93226 XTRNL ECG REC<48 HR SCAN A/R: CPT

## 2024-05-10 DIAGNOSIS — Z23 NEED FOR PNEUMOCOCCAL VACCINATION: ICD-10-CM

## 2024-05-26 ENCOUNTER — HOSPITAL ENCOUNTER (EMERGENCY)
Facility: HOSPITAL | Age: 59
Discharge: HOME | End: 2024-05-26
Attending: EMERGENCY MEDICINE | Admitting: EMERGENCY MEDICINE
Payer: MEDICARE

## 2024-05-26 ENCOUNTER — APPOINTMENT (EMERGENCY)
Dept: RADIOLOGY | Facility: HOSPITAL | Age: 59
End: 2024-05-26
Payer: MEDICARE

## 2024-05-26 VITALS
SYSTOLIC BLOOD PRESSURE: 161 MMHG | HEART RATE: 80 BPM | RESPIRATION RATE: 18 BRPM | TEMPERATURE: 96.9 F | OXYGEN SATURATION: 96 % | DIASTOLIC BLOOD PRESSURE: 82 MMHG

## 2024-05-26 DIAGNOSIS — R07.89 CHEST WALL PAIN: Primary | ICD-10-CM

## 2024-05-26 LAB
ALBUMIN SERPL BCP-MCNC: 4 G/DL (ref 3.5–5)
ALP SERPL-CCNC: 60 U/L (ref 34–104)
ALT SERPL W P-5'-P-CCNC: 29 U/L (ref 7–52)
ANION GAP SERPL CALCULATED.3IONS-SCNC: 4 MMOL/L (ref 4–13)
AST SERPL W P-5'-P-CCNC: 21 U/L (ref 13–39)
BASOPHILS # BLD AUTO: 0.04 THOUSANDS/ÂΜL (ref 0–0.1)
BASOPHILS NFR BLD AUTO: 1 % (ref 0–1)
BILIRUB SERPL-MCNC: 0.43 MG/DL (ref 0.2–1)
BUN SERPL-MCNC: 16 MG/DL (ref 5–25)
CALCIUM SERPL-MCNC: 9.3 MG/DL (ref 8.4–10.2)
CARDIAC TROPONIN I PNL SERPL HS: 3 NG/L
CHLORIDE SERPL-SCNC: 103 MMOL/L (ref 96–108)
CO2 SERPL-SCNC: 29 MMOL/L (ref 21–32)
CREAT SERPL-MCNC: 1.19 MG/DL (ref 0.6–1.3)
EOSINOPHIL # BLD AUTO: 0.25 THOUSAND/ÂΜL (ref 0–0.61)
EOSINOPHIL NFR BLD AUTO: 3 % (ref 0–6)
ERYTHROCYTE [DISTWIDTH] IN BLOOD BY AUTOMATED COUNT: 13.4 % (ref 11.6–15.1)
GFR SERPL CREATININE-BSD FRML MDRD: 66 ML/MIN/1.73SQ M
GLUCOSE SERPL-MCNC: 91 MG/DL (ref 65–140)
HCT VFR BLD AUTO: 45.1 % (ref 36.5–49.3)
HGB BLD-MCNC: 14.5 G/DL (ref 12–17)
IMM GRANULOCYTES # BLD AUTO: 0.05 THOUSAND/UL (ref 0–0.2)
IMM GRANULOCYTES NFR BLD AUTO: 1 % (ref 0–2)
LYMPHOCYTES # BLD AUTO: 2.24 THOUSANDS/ÂΜL (ref 0.6–4.47)
LYMPHOCYTES NFR BLD AUTO: 31 % (ref 14–44)
MCH RBC QN AUTO: 28.4 PG (ref 26.8–34.3)
MCHC RBC AUTO-ENTMCNC: 32.2 G/DL (ref 31.4–37.4)
MCV RBC AUTO: 88 FL (ref 82–98)
MONOCYTES # BLD AUTO: 0.67 THOUSAND/ÂΜL (ref 0.17–1.22)
MONOCYTES NFR BLD AUTO: 9 % (ref 4–12)
NEUTROPHILS # BLD AUTO: 4.04 THOUSANDS/ÂΜL (ref 1.85–7.62)
NEUTS SEG NFR BLD AUTO: 55 % (ref 43–75)
NRBC BLD AUTO-RTO: 0 /100 WBCS
PLATELET # BLD AUTO: 195 THOUSANDS/UL (ref 149–390)
PMV BLD AUTO: 9.5 FL (ref 8.9–12.7)
POTASSIUM SERPL-SCNC: 4 MMOL/L (ref 3.5–5.3)
PROT SERPL-MCNC: 6.9 G/DL (ref 6.4–8.4)
RBC # BLD AUTO: 5.11 MILLION/UL (ref 3.88–5.62)
SODIUM SERPL-SCNC: 136 MMOL/L (ref 135–147)
WBC # BLD AUTO: 7.29 THOUSAND/UL (ref 4.31–10.16)

## 2024-05-26 PROCEDURE — 36415 COLL VENOUS BLD VENIPUNCTURE: CPT | Performed by: EMERGENCY MEDICINE

## 2024-05-26 PROCEDURE — 99284 EMERGENCY DEPT VISIT MOD MDM: CPT | Performed by: EMERGENCY MEDICINE

## 2024-05-26 PROCEDURE — 93005 ELECTROCARDIOGRAM TRACING: CPT

## 2024-05-26 PROCEDURE — 99285 EMERGENCY DEPT VISIT HI MDM: CPT

## 2024-05-26 PROCEDURE — 71045 X-RAY EXAM CHEST 1 VIEW: CPT

## 2024-05-26 PROCEDURE — 85025 COMPLETE CBC W/AUTO DIFF WBC: CPT | Performed by: EMERGENCY MEDICINE

## 2024-05-26 PROCEDURE — 80053 COMPREHEN METABOLIC PANEL: CPT | Performed by: EMERGENCY MEDICINE

## 2024-05-26 PROCEDURE — 84484 ASSAY OF TROPONIN QUANT: CPT | Performed by: EMERGENCY MEDICINE

## 2024-05-26 NOTE — ED ATTENDING ATTESTATION
5/26/2024  I, Smith De León MD, saw and evaluated the patient. I have discussed the patient with the resident/non-physician practitioner and agree with the resident's/non-physician practitioner's findings, Plan of Care, and MDM as documented in the resident's/non-physician practitioner's note, except where noted. All available labs and Radiology studies were reviewed.  I was present for key portions of any procedure(s) performed by the resident/non-physician practitioner and I was immediately available to provide assistance.       At this point I agree with the current assessment done in the Emergency Department.  I have conducted an independent evaluation of this patient a history and physical is as follows:    ED Course         Critical Care Time  Procedures    57 yo male with left sided chest pain since 6pm with no radiation of pain, tender on 4th rib, no pain with rest, worse with movement. No sob, no n/v/diaphoresis, no trauma, no injury. Pmh hypothyroid.  Vss, afebrile, lungs cta, rrr, abdomen soft nontender, reproducible tenderness on chest wall.  Cardiac workup, chest wall tenderness.  Pain meds.

## 2024-05-26 NOTE — ED PROVIDER NOTES
History  Chief Complaint   Patient presents with    Chest Pain     Pt reports mid sternal chest pain that started around 6pm denies taking any medications. Denies back pain or n/v/d     58-year-old male with history of hypothyroidism presents emergency department today for evaluation of left-sided chest pain that began at 6 PM this evening which is approximately 7 hours prior to his presentation early this morning.  He tells me the pain is located to 1 specific spot left side of his chest and does not radiate to his jaw arm or back.  He tells me the pain is worse with movement or with direct pressure.  He has no associated shortness of breath or diaphoresis.  He is no associated nausea vomiting headache blurry vision or dizziness.  He has never experienced chest pain legs before and does not recall an injury.  He tells me he has no history of MI or arrhythmias.  Compliant with his medications.         Prior to Admission Medications   Prescriptions Last Dose Informant Patient Reported? Taking?   Diclofenac Sodium (VOLTAREN) 1 %   No No   Sig: APPLY 2 G TOPICALLY 4 (FOUR) TIMES A DAY   QUEtiapine (SEROquel) 100 mg tablet   No No   Sig: AMRITA 1 TABLETA POR VIA ORAL DIARIAMENTE ANTES DE DORMIR EN LA NOCHE   acetaminophen (TYLENOL) 325 mg tablet   No No   Sig: Take 2 tablets (650 mg total) by mouth every 6 (six) hours as needed for mild pain   atorvastatin (LIPITOR) 20 mg tablet   No No   Sig: Take 1 tablet (20 mg total) by mouth daily AMRITA 1 TABLETA POR VIA ORAL DIARIAMENTE   brimonidine (ALPHAGAN P) 0.15 % ophthalmic solution   No No   Sig: Apply 1 drop to eye 2 (two) times a day   carbamide peroxide (DEBROX) 6.5 % otic solution   No No   Sig: Administer 5 drops into both ears 2 (two) times a day   cholecalciferol (VITAMIN D3) 25 mcg (1,000 units) tablet   No No   Sig: AMRITA 1 TABLETA POR VIA ORAL DIARIAMENTE   fluticasone (FLONASE) 50 mcg/act nasal spray   No No   Sig: USE 2 SPRAYS INTO EACH NOSTRIL DAILY    latanoprost (XALATAN) 0.005 % ophthalmic solution  Self Yes No   Sig: Apply 1 drop to eye daily   levothyroxine 125 mcg tablet   No No   Sig: Take 1 tablet (125 mcg total) by mouth daily   simethicone (MYLICON,GAS-X) 125 MG CAPS   No No   Sig: Take 1 capsule (125 mg total) by mouth every 8 (eight) hours as needed for flatulence (stomach gas)   timolol (TIMOPTIC) 0.5 % ophthalmic solution  Self No No   Sig: Apply 1 drop to eye every morning   traZODone (DESYREL) 100 mg tablet   No No   Sig: AMRITA 1 TABLETA POR VIA ORAL DIARIAMENTE ANTES DE DORMIR EN LA NOCHE   vitamin B-12 (CYANOCOBALAMIN) 250 MCG tablet   No No   Sig: Take 1 tablet (250 mcg total) by mouth daily      Facility-Administered Medications: None       Past Medical History:   Diagnosis Date    Anxiety     Bipolar disorder (HCC)     Cardiomyopathy (HCC)     Depression     GERD (gastroesophageal reflux disease)     Glaucoma     Hiatal hernia     Hyperlipidemia     Hyperthyroidism     Thyroid disease        Past Surgical History:   Procedure Laterality Date    CHOLECYSTECTOMY      CHOLECYSTECTOMY LAPAROSCOPIC      last assessed: 4/23/15    COLONOSCOPY      EYE SURGERY      N[place] COLONOSCOPY FLX DX W/COLLJ SPEC WHEN PFRMD N/A 4/4/2019    Procedure: COLONOSCOPY;  Surgeon: Virgil Gómez MD;  Location: [facility];  Service: Gastroenterology    [place] ESOPHAGOGASTRODUODENOSCOPY TRANSORAL DIAGNOSTIC N/A 4/4/2019    Procedure: ESOPHAGOGASTRODUODENOSCOPY (EGD);  Surgeon: Virgil Gómez MD;  Location: [facility];  Service: Gastroenterology    UPPER GASTROINTESTINAL ENDOSCOPY         Family History   Problem Relation Age of Onset    Diabetes Mother     Hyperlipidemia Mother     Thyroid cancer Mother     Dementia Father     Coronary artery disease Maternal Grandmother     Hypertension Maternal Grandmother     Heart attack Maternal Grandmother      I have reviewed and agree with the history as documented.    E-Cigarette/Vaping    E-Cigarette Use Never User       E-Cigarette/Vaping Substances    Nicotine No     THC No     CBD No     Flavoring No     Other No     Unknown No      Social History     Tobacco Use    Smoking status: Never    Smokeless tobacco: Never   Vaping Use    Vaping status: Never Used   Substance Use Topics    Alcohol use: No    Drug use: No        Review of Systems   Constitutional:  Negative for activity change, chills, diaphoresis, fatigue and fever.   HENT:  Negative for congestion, facial swelling and sore throat.    Eyes:  Negative for visual disturbance.   Respiratory:  Negative for cough, chest tightness and shortness of breath.    Cardiovascular:  Positive for chest pain. Negative for palpitations and leg swelling.   Gastrointestinal:  Negative for abdominal pain, nausea and vomiting.   Musculoskeletal:  Negative for back pain, neck pain and neck stiffness.   Neurological:  Negative for dizziness, tremors, syncope, facial asymmetry, weakness and light-headedness.   Psychiatric/Behavioral:  Negative for agitation and confusion.        Physical Exam  ED Triage Vitals [05/26/24 0059]   Temperature Pulse Respirations Blood Pressure SpO2   (!) 96.9 °F (36.1 °C) 86 18 161/82 96 %      Temp Source Heart Rate Source Patient Position - Orthostatic VS BP Location FiO2 (%)   Temporal Monitor Lying Left arm --      Pain Score       8             Orthostatic Vital Signs  Vitals:    05/26/24 0059   BP: 161/82   Pulse: 86   Patient Position - Orthostatic VS: Lying       Physical Exam  Vitals reviewed.   Constitutional:       General: He is not in acute distress.     Appearance: He is well-developed.   HENT:      Head: Normocephalic and atraumatic.   Eyes:      Pupils: Pupils are equal, round, and reactive to light.   Cardiovascular:      Rate and Rhythm: Normal rate and regular rhythm.      Heart sounds: Normal heart sounds.      No friction rub.   Pulmonary:      Effort: Pulmonary effort is normal. No tachypnea or respiratory distress.      Breath sounds:  Normal breath sounds.   Chest:      Chest wall: Tenderness present. No mass, deformity or crepitus.      Comments: Point tenderness noted at costal margin of fourth/fifth costochondral junction with sternum   Abdominal:      Palpations: Abdomen is soft. There is no mass.      Tenderness: There is no abdominal tenderness. There is no guarding.   Musculoskeletal:         General: Normal range of motion.      Right lower leg: No tenderness. No edema.      Left lower leg: No tenderness. No edema.   Skin:     General: Skin is warm and dry.      Findings: No erythema or rash.   Neurological:      General: No focal deficit present.      Mental Status: He is alert and oriented to person, place, and time.         ED Medications  Medications - No data to display    Diagnostic Studies  Results Reviewed       Procedure Component Value Units Date/Time    HS Troponin I 2hr [[id number]]     Lab Status: No result Specimen: Blood     HS Troponin 0hr (reflex protocol) [[id number]]  (Normal) Collected: 05/26/24 0226    Lab Status: Final result Specimen: Blood from Arm, Right Updated: 05/26/24 0310     hs TnI 0hr 3 ng/L     Comprehensive metabolic panel [[id number]] Collected: 05/26/24 0226    Lab Status: Final result Specimen: Blood from Arm, Right Updated: 05/26/24 0301     Sodium 136 mmol/L      Potassium 4.0 mmol/L      Chloride 103 mmol/L      CO2 29 mmol/L      ANION GAP 4 mmol/L      BUN 16 mg/dL      Creatinine 1.19 mg/dL      Glucose 91 mg/dL      Calcium 9.3 mg/dL      AST 21 U/L      ALT 29 U/L      Alkaline Phosphatase 60 U/L      Total Protein 6.9 g/dL      Albumin 4.0 g/dL      Total Bilirubin 0.43 mg/dL      eGFR 66 ml/min/1.73sq m     Narrative:      National Kid[employer] Foundation guidelines for Chronic Kidney Disease (CKD):     Stage 1 with normal or high GFR (GFR > 90 mL/min/1.73 square meters)    Stage 2 Mild CKD (GFR = 60-89 mL/min/1.73 square meters)    Stage 3A Moderate CKD (GFR = 45-59 mL/min/1.73 square  meters)    Stage 3B Moderate CKD (GFR = 30-44 mL/min/1.73 square meters)    Stage 4 Severe CKD (GFR = 15-29 mL/min/1.73 square meters)    Stage 5 End Stage CKD (GFR <15 mL/min/1.73 square meters)  Note: GFR calculation is accurate only with a steady state creatinine    CBC and differential [[id number]] Collected: 05/26/24 0226    Lab Status: Final result Specimen: Blood from Arm, Right Updated: 05/26/24 0244     WBC 7.29 Thousand/uL      RBC 5.11 Million/uL      Hemoglobin 14.5 g/dL      Hematocrit 45.1 %      MCV 88 fL      MCH 28.4 pg      MCHC 32.2 g/dL      RDW 13.4 %      MPV 9.5 fL      Platelets 195 Thousands/uL      nRBC 0 /100 WBCs      Segmented % 55 %      Immature Grans % 1 %      Lymphocytes % 31 %      Monocytes % 9 %      Eosinophils Relative 3 %      Basophils Relative 1 %      Absolute Neutrophils 4.04 Thousands/µL      Absolute Immature Grans 0.05 Thousand/uL      Absolute Lymphocytes 2.24 Thousands/µL      Absolute Monocytes 0.67 Thousand/µL      Eosinophils Absolute 0.25 Thousand/µL      Basophils Absolute 0.04 Thousands/µL                    XR chest 1 view portable    (Results Pending)         Procedures  Procedures      ED Course                                       Medical Decision Making  58-year-old male presents emergency department today for evaluation of left-sided chest pain.  Pain is localized to 1 specific area along the costochondral junction of ribs 4/5 on the left side near sternum.  Pain does not radiate.  Patient's examination largely reassuring with reproducible chest pain noted this a forementioned area along left sternal border.  EKG was obtained and interpreted by myself attending physician which does not show any signs of ACS or arrhythmias compared to his baseline.  Likely due to costochondritis or muscular strain, however given complaint of chest pain we will perform the remainder of the cardiac workup to include chest x-ray, CBC, CMP and troponin.  Labs are largely  reassuring with a troponin of 3.  Chest x-ray did not show any abnormalities that would otherwise explain his pain.  Most likely diagnosis was reviewed the patient.  Advised him to use Tylenol ibuprofen: Dosing regimen reviewed.  Patient remained hemodynamically stable during his time the emergency department, all questions were answered, he remained largely pain-free at rest, and is appropriate for discharge home with clear return precautions and follow-up instructions.    Amount and/or Complexity of Data Reviewed  Labs: ordered.  Radiology: ordered.          Disposition  Final diagnoses:   Chest wall pain     Time reflects when diagnosis was documented in both MDM as applicable and the Disposition within this note       Time User Action Codes Description Comment    5/26/2024  3:17 AM Wilbert Charlton Add [R07.89] Chest wall pain           ED Disposition       ED Disposition   Discharge    Condition   Stable    Date/Time   Sun May 26, 2024  3:17 AM    Comment   Luis Manuel Moulton discharge to home/self care.                   Follow-up Information    None         Patient's Medications   Discharge Prescriptions    No medications on file     No discharge procedures on file.    PDMP Review       None             ED Provider  Attending physically available and evaluated Luis Manuel Moulton. I managed the patient along with the ED Attending.    Electronically Signed by           Wilbert Charlton MD  05/26/24 3007

## 2024-05-26 NOTE — DISCHARGE INSTRUCTIONS
You were diagnosed with chest wall pain. This is likely due to inflammatory changes and/ or muscular strain.  Continue to take ibuprofen and / or tylenol for pain control as we discussed. Please return to the emergency department if you begin  to experience severe chest pain at rest, radiation of the pain to the jaw, back, or arms, or if you begin to feel dizziness or changes in the chest pain.

## 2024-05-27 LAB
ATRIAL RATE: 89 BPM
P AXIS: 50 DEGREES
PR INTERVAL: 158 MS
QRS AXIS: 90 DEGREES
QRSD INTERVAL: 76 MS
QT INTERVAL: 376 MS
QTC INTERVAL: 457 MS
T WAVE AXIS: 38 DEGREES
VENTRICULAR RATE: 89 BPM

## 2024-05-27 PROCEDURE — 93010 ELECTROCARDIOGRAM REPORT: CPT | Performed by: INTERNAL MEDICINE

## 2024-05-28 ENCOUNTER — OFFICE VISIT (OUTPATIENT)
Dept: INTERNAL MEDICINE CLINIC | Facility: CLINIC | Age: 59
End: 2024-05-28

## 2024-05-28 VITALS
SYSTOLIC BLOOD PRESSURE: 106 MMHG | OXYGEN SATURATION: 97 % | TEMPERATURE: 98.3 F | HEART RATE: 98 BPM | RESPIRATION RATE: 14 BRPM | DIASTOLIC BLOOD PRESSURE: 70 MMHG | BODY MASS INDEX: 27.71 KG/M2 | HEIGHT: 68 IN | WEIGHT: 182.8 LBS

## 2024-05-28 DIAGNOSIS — E78.1 HYPERTRIGLYCERIDEMIA: ICD-10-CM

## 2024-05-28 DIAGNOSIS — J30.1 SEASONAL ALLERGIC RHINITIS DUE TO POLLEN: ICD-10-CM

## 2024-05-28 DIAGNOSIS — K59.00 CONSTIPATION, UNSPECIFIED CONSTIPATION TYPE: ICD-10-CM

## 2024-05-28 DIAGNOSIS — F31.9 BIPOLAR AFFECTIVE DISORDER, REMISSION STATUS UNSPECIFIED (HCC): ICD-10-CM

## 2024-05-28 DIAGNOSIS — J30.9 ALLERGIC RHINITIS: ICD-10-CM

## 2024-05-28 DIAGNOSIS — R07.9 CHEST PAIN, UNSPECIFIED TYPE: ICD-10-CM

## 2024-05-28 DIAGNOSIS — F41.8 DEPRESSION WITH ANXIETY: ICD-10-CM

## 2024-05-28 DIAGNOSIS — Z11.4 SCREENING FOR HIV (HUMAN IMMUNODEFICIENCY VIRUS): ICD-10-CM

## 2024-05-28 DIAGNOSIS — R00.2 PALPITATIONS: ICD-10-CM

## 2024-05-28 DIAGNOSIS — Z00.00 MEDICARE ANNUAL WELLNESS VISIT, SUBSEQUENT: Primary | ICD-10-CM

## 2024-05-28 DIAGNOSIS — E78.00 PURE HYPERCHOLESTEROLEMIA: ICD-10-CM

## 2024-05-28 DIAGNOSIS — Z23 NEED FOR PNEUMOCOCCAL VACCINATION: ICD-10-CM

## 2024-05-28 DIAGNOSIS — R79.89 LOW VITAMIN B12 LEVEL: ICD-10-CM

## 2024-05-28 DIAGNOSIS — K21.9 GASTROESOPHAGEAL REFLUX DISEASE WITHOUT ESOPHAGITIS: ICD-10-CM

## 2024-05-28 DIAGNOSIS — E03.9 HYPOTHYROIDISM, UNSPECIFIED TYPE: ICD-10-CM

## 2024-05-28 DIAGNOSIS — E03.9 HYPOTHYROIDISM: ICD-10-CM

## 2024-05-28 PROCEDURE — G0439 PPPS, SUBSEQ VISIT: HCPCS | Performed by: INTERNAL MEDICINE

## 2024-05-28 RX ORDER — ATORVASTATIN CALCIUM 20 MG/1
20 TABLET, FILM COATED ORAL DAILY
Qty: 30 TABLET | Refills: 0 | Status: SHIPPED | OUTPATIENT
Start: 2024-05-28

## 2024-05-28 RX ORDER — LEVOTHYROXINE SODIUM 0.12 MG/1
125 TABLET ORAL DAILY
Qty: 90 TABLET | Refills: 3 | Status: SHIPPED | OUTPATIENT
Start: 2024-05-28

## 2024-05-28 RX ORDER — POLYETHYLENE GLYCOL 3350 17 G/17G
17 POWDER, FOR SOLUTION ORAL DAILY PRN
Qty: 90 EACH | Refills: 0 | Status: SHIPPED | OUTPATIENT
Start: 2024-05-28 | End: 2024-08-26

## 2024-05-28 RX ORDER — FLUTICASONE PROPIONATE 50 MCG
SPRAY, SUSPENSION (ML) NASAL
Qty: 16 G | Refills: 0 | Status: SHIPPED | OUTPATIENT
Start: 2024-05-28

## 2024-05-28 RX ORDER — FAMOTIDINE 20 MG/1
20 TABLET, FILM COATED ORAL
Qty: 60 TABLET | Refills: 2 | Status: SHIPPED | OUTPATIENT
Start: 2024-05-28 | End: 2024-11-24

## 2024-05-28 RX ORDER — PANTOPRAZOLE SODIUM 40 MG/1
40 TABLET, DELAYED RELEASE ORAL DAILY
Qty: 30 TABLET | Refills: 2 | Status: SHIPPED | OUTPATIENT
Start: 2024-05-28 | End: 2024-08-26

## 2024-05-28 RX ORDER — TRAZODONE HYDROCHLORIDE 100 MG/1
TABLET ORAL
Qty: 90 TABLET | Refills: 1 | Status: SHIPPED | OUTPATIENT
Start: 2024-05-28

## 2024-05-28 NOTE — PATIENT INSTRUCTIONS
Medicare Preventive Visit Patient Instructions  Thank you for completing your Welcome to Medicare Visit or Medicare Annual Wellness Visit today. Your next wellness visit will be due in one year (5/29/2025).  The screening/preventive services that you may require over the next 5-10 years are detailed below. Some tests may not apply to you based off risk factors and/or age. Screening tests ordered at today's visit but not completed yet may show as past due. Also, please note that scanned in results may not display below.  Preventive Screenings:  Service Recommendations Previous Testing/Comments   Colorectal Cancer Screening  Colonoscopy    Fecal Occult Blood Test (FOBT)/Fecal Immunochemical Test (FIT)  Fecal DNA/Cologuard Test  Flexible Sigmoidoscopy Age: 45-75 years old   Colonoscopy: every 10 years (May be performed more frequently if at higher risk)  OR  FOBT/FIT: every 1 year  OR  Cologuard: every 3 years  OR  Sigmoidoscopy: every 5 years  Screening may be recommended earlier than age 45 if at higher risk for colorectal cancer. Also, an individualized decision between you and your healthcare provider will decide whether screening between the ages of 76-85 would be appropriate. Colonoscopy: 06/20/2019  FOBT/FIT: Not on file  Cologuard: Not on file  Sigmoidoscopy: Not on file          Prostate Cancer Screening Individualized decision between patient and health care provider in men between ages of 55-69   Medicare will cover every 12 months beginning on the day after your 50th birthday PSA: No results in last 5 years           Hepatitis C Screening Once for adults born between 1945 and 1965  More frequently in patients at high risk for Hepatitis C Hep C Antibody: 05/23/2018        Diabetes Screening 1-2 times per year if you're at risk for diabetes or have pre-diabetes Fasting glucose: 103 mg/dL (7/12/2022)  A1C: 5.7 % (7/12/2022)      Cholesterol Screening Once every 5 years if you don't have a lipid disorder. May  order more often based on risk factors. Lipid panel: 07/12/2022         Other Preventive Screenings Covered by Medicare:  Abdominal Aortic Aneurysm (AAA) Screening: covered once if your at risk. You're considered to be at risk if you have a family history of AAA or a male between the age of 65-75 who smoking at least 100 cigarettes in your lifetime.  Lung Cancer Screening: covers low dose CT scan once per year if you meet all of the following conditions: (1) Age 55-77; (2) No signs or symptoms of lung cancer; (3) Current smoker or have quit smoking within the last 15 years; (4) You have a tobacco smoking history of at least 20 pack years (packs per day x number of years you smoked); (5) You get a written order from a healthcare provider.  Glaucoma Screening: covered annually if you're considered high risk: (1) You have diabetes OR (2) Family history of glaucoma OR (3)  aged 50 and older OR (4)  American aged 65 and older  Osteoporosis Screening: covered every 2 years if you meet one of the following conditions: (1) Have a vertebral abnormality; (2) On glucocorticoid therapy for more than 3 months; (3) Have primary hyperparathyroidism; (4) On osteoporosis medications and need to assess response to drug therapy.  HIV Screening: covered annually if you're between the age of 15-65. Also covered annually if you are younger than 15 and older than 65 with risk factors for HIV infection. For pregnant patients, it is covered up to 3 times per pregnancy.    Immunizations:  Immunization Recommendations   Influenza Vaccine Annual influenza vaccination during flu season is recommended for all persons aged >= 6 months who do not have contraindications   Pneumococcal Vaccine   * Pneumococcal conjugate vaccine = PCV13 (Prevnar 13), PCV15 (Vaxneuvance), PCV20 (Prevnar 20)  * Pneumococcal polysaccharide vaccine = PPSV23 (Pneumovax) Adults 19-63 yo with certain risk factors or if 65+ yo  If never received any  pneumonia vaccine: recommend Prevnar 20 (PCV20)  Give PCV20 if previously received 1 dose of PCV13 or PPSV23   Hepatitis B Vaccine 3 dose series if at intermediate or high risk (ex: diabetes, end stage renal disease, liver disease)   Respiratory syncytial virus (RSV) Vaccine - COVERED BY MEDICARE PART D  * RSVPreF3 (Arexvy) CDC recommends that adults 60 years of age and older may receive a single dose of RSV vaccine using shared clinical decision-making (SCDM)   Tetanus (Td) Vaccine - COST NOT COVERED BY MEDICARE PART B Following completion of primary series, a booster dose should be given every 10 years to maintain immunity against tetanus. Td may also be given as tetanus wound prophylaxis.   Tdap Vaccine - COST NOT COVERED BY MEDICARE PART B Recommended at least once for all adults. For pregnant patients, recommended with each pregnancy.   Shingles Vaccine (Shingrix) - COST NOT COVERED BY MEDICARE PART B  2 shot series recommended in those 19 years and older who have or will have weakened immune systems or those 50 years and older     Health Maintenance Due:      Topic Date Due   • HIV Screening  Never done   • Colorectal Cancer Screening  06/20/2029   • Hepatitis C Screening  Completed     Immunizations Due:      Topic Date Due   • COVID-19 Vaccine (4 - 2023-24 season) 09/01/2023     Advance Directives   What are advance directives?  Advance directives are legal documents that state your wishes and plans for medical care. These plans are made ahead of time in case you lose your ability to make decisions for yourself. Advance directives can apply to any medical decision, such as the treatments you want, and if you want to donate organs.   What are the types of advance directives?  There are many types of advance directives, and each state has rules about how to use them. You may choose a combination of any of the following:  Living will:  This is a written record of the treatment you want. You can also choose  which treatments you do not want, which to limit, and which to stop at a certain time. This includes surgery, medicine, IV fluid, and tube feedings.   Durable power of  for healthcare (DPAHC):  This is a written record that states who you want to make healthcare choices for you when you are unable to make them for yourself. This person, called a proxy, is usually a family member or a friend. You may choose more than 1 proxy.  Do not resuscitate (DNR) order:  A DNR order is used in case your heart stops beating or you stop breathing. It is a request not to have certain forms of treatment, such as CPR. A DNR order may be included in other types of advance directives.  Medical directive:  This covers the care that you want if you are in a coma, near death, or unable to make decisions for yourself. You can list the treatments you want for each condition. Treatment may include pain medicine, surgery, blood transfusions, dialysis, IV or tube feedings, and a ventilator (breathing machine).  Values history:  This document has questions about your views, beliefs, and how you feel and think about life. This information can help others choose the care that you would choose.  Why are advance directives important?  An advance directive helps you control your care. Although spoken wishes may be used, it is better to have your wishes written down. Spoken wishes can be misunderstood, or not followed. Treatments may be given even if you do not want them. An advance directive may make it easier for your family to make difficult choices about your care.       © Copyright [employer] 2018 Information is for End User's use only and may not be sold, redistributed or otherwise used for commercial purposes. All illustrations and images included in CareNotes® are the copyrighted property of [employer] or [employer]

## 2024-05-28 NOTE — PROGRESS NOTES
Ambulatory Visit  Name: Luis Manuel Moulton      : 1965      MRN: [id number]  Encounter Provider: Neris Roca DO  Encounter Date: 2024   Encounter department: [facility]    Assessment & Plan   1. Medicare annual wellness visit, subsequent  2. Gastroesophageal reflux disease without esophagitis  -     pantoprazole (PROTONIX) 40 mg tablet; Take 1 tablet (40 mg total) by mouth daily  -     famotidine (PEPCID) 20 mg tablet; Take 1 tablet (20 mg total) by mouth daily at bedtime as needed for heartburn  3. Hypothyroidism, unspecified type  4. Depression with anxiety  5. Low vitamin B12 level  6. Hypertriglyceridemia  7. Bipolar affective disorder, remission status unspecified (HCC)  8. Need for pneumococcal vaccination  -     traZODone (DESYREL) 100 mg tablet; AMRITA 1 TABLETA POR VIA ORAL DIARIAMENTE ANTES DE DORMIR EN LA NOCHE  9. Hypothyroidism  -     levothyroxine 125 mcg tablet; Take 1 tablet (125 mcg total) by mouth daily  10. Pure hypercholesterolemia  -     atorvastatin (LIPITOR) 20 mg tablet; Take 1 tablet (20 mg total) by mouth daily AMRITA 1 TABLETA POR VIA ORAL DIARIAMENTE  11. Constipation, unspecified constipation type  -     polyethylene glycol (MIRALAX) 17 g packet; Take 17 g by mouth daily as needed (constipation)  12. Seasonal allergic rhinitis due to pollen  13. Allergic rhinitis  -     fluticasone (FLONASE) 50 mcg/act nasal spray; USE 2 SPRAYS INTO EACH NOSTRIL DAILY  14. Screening for HIV (human immunodeficiency virus)  -     HIV 1/2 AG/AB w Reflex [facility] for 2 yr old and above; Future  15. Palpitations  16. Chest pain   Evaluated in the ED fo chest pain, abnormal EKG with concern for septal infarct, poor R wave progression, with normal troponin. Will obtain stress echo. Had echo for prior palpitations which was wnl.   Pt needs his repeat lab work: lipid and fasting glucose  BMI Counseling: Body mass index is 27.79 kg/m². The BMI is above  normal. Nutrition recommendations include encouraging healthy choices of fruits and vegetables, decreasing fast food intake and limiting drinks that contain sugar. Exercise recommendations include exercising 3-5 times per week. Rationale for BMI follow-up plan is due to patient being overweight or obese.       Preventive health issues were discussed with patient, and age appropriate screening tests were ordered as noted in patient's After Visit Summary. Personalized health advice and appropriate referrals for health education or preventive services given if needed, as noted in patient's After Visit Summary.    History of Present Illness     Pt reports doing well. Complaint with medication. Was seen in the ED 5/26/24 for right chest wall pain after lifting. ACS workup negative. Pt dc on tylenol/NSAIDs prn and has had improvement with pain. Only excerbated with bicep curls and right shoulder extension.     [id number] German interpretation        Patient Care Team:  Neris Roca DO as PCP - General (Internal Medicine)  MD Virgil Guillaume MD as Endoscopist    Review of Systems  Medical History Reviewed by provider this encounter:  Meds  Problems       Annual Wellness Visit Questionnaire   Luis Manuel is here for his Subsequent Wellness visit.     Health Risk Assessment:   Patient rates overall health as very good. Patient feels that their physical health rating is same. Patient is very satisfied with their life. Eyesight was rated as same. Hearing was rated as same. Patient feels that their emotional and mental health rating is same. Patients states they are never, rarely angry. Patient states they are sometimes unusually tired/fatigued. Pain experienced in the last 7 days has been none. Patient states that he has experienced no weight loss or gain in last 6 months.     Depression Screening:   PHQ-2 Score: 0      Fall Risk Screening:   In the past year, patient has experienced: no history of  falling in past year      Home Safety:  Patient does not have trouble with stairs inside or outside of their home. Patient has working smoke alarms and has working carbon monoxide detector. Home safety hazards include: none.     Nutrition:   Current diet is Regular.     Medications:   Patient is currently taking over-the-counter supplements. OTC medications include: see medication list. Patient is able to manage medications.     Activities of Daily Living (ADLs)/Instrumental Activities of Daily Living (IADLs):   Walk and transfer into and out of bed and chair?: Yes  Dress and groom yourself?: Yes    Bathe or shower yourself?: Yes    Feed yourself? Yes  Do your laundry/housekeeping?: Yes  Manage your money, pay your bills and track your expenses?: Yes  Make your own meals?: No    Do your own shopping?: Yes    Advance Care Planning:   Living will: No    Durable POA for healthcare: No    Advanced directive: No    Advanced directive counseling given: Yes    ACP document given: Yes    Patient declined ACP directive: No      Comments: POLST given to patient    PREVENTIVE SCREENINGS      Cardiovascular Screening:    General: Screening Not Indicated and History Lipid Disorder      Diabetes Screening:     General: Screening Current      Colorectal Cancer Screening:     General: Screening Current      Lung Cancer Screening:     General: Screening Not Indicated      Hepatitis C Screening:    General: Screening Current    Screening, Brief Intervention, and Referral to Treatment (SBIRT)    Screening  Typical number of drinks in a day: 0  Typical number of drinks in a week: 0  Interpretation: Low risk drinking behavior.    AUDIT-C Screenin) How often did you have a drink containing alcohol in the past year? never  2) How many drinks did you have on a typical day when you were drinking in the past year? 0  3) How often did you have 6 or more drinks on one occasion in the past year? never    AUDIT-C Score: 0  Interpretation:  "Score 0-3 (male): Negative screen for alcohol misuse    Single Item Drug Screening:  How often have you used an illegal drug (including marijuana) or a prescription medication for non-medical reasons in the past year? never    Single Item Drug Screen Score: 0  Interpretation: Negative screen for possible drug use disorder    Social Determinants of Health     Financial Resource Strain: Low Risk  (4/30/2024)    Overall Financial Resource Strain (CARDIA)     Difficulty of Paying Living Expenses: Not hard at all   Food Insecurity: No Food Insecurity (4/30/2024)    Hunger Vital Sign     Worried About Running Out of Food in the Last Year: Never true     Ran Out of Food in the Last Year: Never true   Transportation Needs: No Transportation Needs (4/30/2024)    PRAPARE - Transportation     Lack of Transportation (Medical): No     Lack of Transportation (Non-Medical): No   Housing Stability: Low Risk  (4/30/2024)    Housing Stability Vital Sign     Unable to Pay for Housing in the Last Year: No     Number of Places Lived in the Last Year: 1     Unstable Housing in the Last Year: No   Utilities: Not At Risk (4/30/2024)    [facility] Utilities     Threatened with loss of utilities: No     No results found.    Objective     /70 (BP Location: Left arm, Patient Position: Sitting, Cuff Size: Standard)   Pulse 98   Temp 98.3 °F (36.8 °C) (Temporal)   Resp 14   Ht 5' 8\" (1.727 m)   Wt 82.9 kg (182 lb 12.8 oz)   SpO2 97%   BMI 27.79 kg/m²     Physical Exam  Vitals and nursing note reviewed.   Constitutional:       General: He is not in acute distress.     Appearance: He is well-developed.   HENT:      Head: Normocephalic and atraumatic.   Eyes:      Conjunctiva/sclera: Conjunctivae normal.   Cardiovascular:      Rate and Rhythm: Normal rate and regular rhythm.      Heart sounds: No murmur heard.  Pulmonary:      Effort: Pulmonary effort is normal. No respiratory distress.      Breath sounds: Normal breath sounds.   Abdominal: "      Palpations: Abdomen is soft.      Tenderness: There is no abdominal tenderness.   Musculoskeletal:         General: Tenderness present. No swelling.      Cervical back: Neck supple.      Comments: Right 4th rib TTP at costosternal angle   Skin:     General: Skin is warm and dry.      Capillary Refill: Capillary refill takes less than 2 seconds.   Neurological:      Mental Status: He is alert.   Psychiatric:         Mood and Affect: Mood normal.       Administrative Statements   I have spent a total time of 45 minutes on 05/28/24 In caring for this patient including Patient and family education, Importance of tx compliance, Risk factor reductions, Documenting in the medical record, Reviewing / ordering tests, medicine, procedures  , and Obtaining or reviewing history  .

## 2024-06-21 ENCOUNTER — HOSPITAL ENCOUNTER (OUTPATIENT)
Dept: NON INVASIVE DIAGNOSTICS | Facility: HOSPITAL | Age: 59
Discharge: HOME | End: 2024-06-21
Payer: MEDICARE

## 2024-06-21 VITALS
HEIGHT: 68 IN | HEART RATE: 94 BPM | SYSTOLIC BLOOD PRESSURE: 112 MMHG | WEIGHT: 182 LBS | OXYGEN SATURATION: 98 % | BODY MASS INDEX: 27.58 KG/M2 | DIASTOLIC BLOOD PRESSURE: 78 MMHG

## 2024-06-21 DIAGNOSIS — R07.9 CHEST PAIN, UNSPECIFIED TYPE: ICD-10-CM

## 2024-06-21 LAB
CHEST PAIN STATEMENT: NORMAL
CHEST PAIN STATEMENT: NORMAL
MAX DIASTOLIC BP: 84 MMHG
MAX DIASTOLIC BP: 84 MMHG
MAX HR PERCENT: 93 %
MAX HR: 151 BPM
MAX PREDICTED HEART RATE: 161 BPM
MAX PREDICTED HEART RATE: 161 BPM
PROTOCOL NAME: NORMAL
PROTOCOL NAME: NORMAL
RATE PRESSURE PRODUCT: NORMAL
REASON FOR TERMINATION: NORMAL
REASON FOR TERMINATION: NORMAL
SL CV LV EF: 60
SL CV STRESS RECOVERY BP: NORMAL MMHG
SL CV STRESS RECOVERY HR: 106 BPM
SL CV STRESS RECOVERY O2 SAT: 98 %
SL CV STRESS STAGE REACHED: 2
STRESS ANGINA INDEX: 0
STRESS BASELINE BP: NORMAL MMHG
STRESS BASELINE HR: 94 BPM
STRESS O2 SAT REST: 98 %
STRESS PEAK HR: 137 BPM
STRESS POST ESTIMATED WORKLOAD: 7 METS
STRESS POST EXERCISE DUR MIN: 6 MIN
STRESS POST EXERCISE DUR SEC: 23 SEC
STRESS POST O2 SAT PEAK: 96 %
STRESS POST PEAK BP: 130 MMHG
STRESS POST PEAK HR: 151 BPM
STRESS POST PEAK HR: 151 BPM
STRESS POST PEAK SYSTOLIC BP: 130 MMHG
STRESS POST PEAK SYSTOLIC BP: 130 MMHG
TARGET HR FORMULA: NORMAL
TARGET HR FORMULA: NORMAL
TEST INDICATION: NORMAL
TEST INDICATION: NORMAL
TR MAX PG: 15 MMHG
TR PEAK VELOCITY: 1.9 M/S
TRICUSPID VALVE PEAK REGURGITATION VELOCITY: 1.93 M/S

## 2024-06-21 PROCEDURE — 93350 STRESS TTE ONLY: CPT | Performed by: INTERNAL MEDICINE

## 2024-06-21 PROCEDURE — 93350 STRESS TTE ONLY: CPT

## 2024-06-25 ENCOUNTER — TELEPHONE (OUTPATIENT)
Dept: INTERNAL MEDICINE CLINIC | Facility: CLINIC | Age: 59
End: 2024-06-25

## 2024-06-25 NOTE — TELEPHONE ENCOUNTER
Folder Color: Purple     Name of Form: [facility]     Form to be filled out by: Dr Roca     Form to be Faxed: [phone]     Patient made aware of 10 day business policy.

## 2024-06-26 DIAGNOSIS — E78.00 PURE HYPERCHOLESTEROLEMIA: ICD-10-CM

## 2024-06-26 RX ORDER — ATORVASTATIN CALCIUM 20 MG/1
20 TABLET, FILM COATED ORAL DAILY
Qty: 30 TABLET | Refills: 0 | Status: SHIPPED | OUTPATIENT
Start: 2024-06-26

## 2024-07-03 DIAGNOSIS — J30.9 ALLERGIC RHINITIS: ICD-10-CM

## 2024-07-05 RX ORDER — FLUTICASONE PROPIONATE 50 MCG
SPRAY, SUSPENSION (ML) NASAL
Qty: 16 G | Refills: 3 | Status: SHIPPED | OUTPATIENT
Start: 2024-07-05

## 2024-07-19 ENCOUNTER — TELEPHONE (OUTPATIENT)
Dept: GASTROENTEROLOGY | Facility: HOSPITAL | Age: 59
End: 2024-07-19

## 2024-07-22 DIAGNOSIS — E78.00 PURE HYPERCHOLESTEROLEMIA: ICD-10-CM

## 2024-07-22 RX ORDER — ATORVASTATIN CALCIUM 20 MG/1
20 TABLET, FILM COATED ORAL DAILY
Qty: 30 TABLET | Refills: 3 | Status: SHIPPED | OUTPATIENT
Start: 2024-07-22

## 2024-08-01 ENCOUNTER — OFFICE VISIT (OUTPATIENT)
Dept: DENTISTRY | Facility: CLINIC | Age: 59
End: 2024-08-01

## 2024-08-01 DIAGNOSIS — K05.5: Primary | ICD-10-CM

## 2024-08-01 PROCEDURE — D7140 EXTRACTION, ERUPTED TOOTH OR EXPOSED ROOT (ELEVATION AND/OR FORCEPS REMOVAL): HCPCS

## 2024-08-01 PROCEDURE — D0140 LIMITED ORAL EVALUATION - PROBLEM FOCUSED: HCPCS

## 2024-08-01 PROCEDURE — D0220 INTRAORAL - PERIAPICAL FIRST RADIOGRAPHIC IMAGE: HCPCS

## 2024-08-01 PROCEDURE — D0270 BITEWING - SINGLE RADIOGRAPHIC IMAGE: HCPCS

## 2024-08-01 RX ORDER — IBUPROFEN 600 MG/1
600 TABLET ORAL EVERY 6 HOURS PRN
Qty: 30 TABLET | Refills: 0 | Status: SHIPPED | OUTPATIENT
Start: 2024-08-01 | End: 2024-08-09

## 2024-08-01 RX ORDER — AMOXICILLIN 500 MG/1
500 CAPSULE ORAL EVERY 8 HOURS SCHEDULED
Qty: 21 CAPSULE | Refills: 0 | Status: SHIPPED | OUTPATIENT
Start: 2024-08-01 | End: 2024-08-08

## 2024-08-01 NOTE — DENTAL PROCEDURE DETAILS
"Limited Exam and EXT of #2    Luis Manuel Moulton 59 y.o. male presents with self to [facility] for Limited exam  PMH reviewed, no changes, ASA II. Significant medical history: Allergic rhinitis, GERD, depression with anxiety. Significant allergies: NKDA. Significant medications: Famotidine and atorvastatin.    Chief complaint:  \"My upper right tooth hurts so much\"    Consent:  Discussed that limited exam focuses on problem area, and same day tx is not guaranteed.  Patient explained to if they wish to have anything else evaluated, they need to return to the practice at which they are a patient of record or schedule a comprehensive exam afterwards.  Patient understands and consent was given by self via verbal consent and signed oral surgery informed consent.    Subjective history:    Onset: 1 week ago.   Provocation: Cold, Biting, Chewing, Spontaneous, Drinking.   Quality: Achy, Throbbing.   Region: Patient points to tooth #2 .   Severity: 9/10.   Timing: constant, keeps patient up at night.    Objective clinical findings:   Oral cancer screening: normal.   Extraoral exam: no remarkable findings.  Intraoral exam: gingival inflammation, Generalized severe chronic periodontitis, and bone loss around #2 (5-6 mm) .     Radiographs: Single PA - #2 and Single BW - #2 .     Pulp testing:  #2 Cold: Exaggerated and lingering response; Percussion: Severe tenderness; Palpation: Moderate pain.    Assessment:  Endo-perio lesion of #2    Plan:   EXT #2, have the pt come in for comp exam next visit, control perio with SRPs, and further restorative procedures will be planned in treatment planning visit.    Referral(s): None needed.  Rx: Amoxicillin and Ibuprofen.  Comprehensive care disposition: Patient expressed interest in becoming a patient of record with one of the  dental clinics.    Patient dismissed ambulatory and alert.    NV: 9/30/24 for comp exam.    Attending:  Dr. Jimenez  examined pt.   "

## 2024-08-02 DIAGNOSIS — Z23 NEED FOR PNEUMOCOCCAL VACCINATION: ICD-10-CM

## 2024-08-08 ENCOUNTER — TELEPHONE (OUTPATIENT)
Dept: GASTROENTEROLOGY | Facility: HOSPITAL | Age: 59
End: 2024-08-08

## 2024-08-09 NOTE — TELEPHONE ENCOUNTER
Spoke with patient and rescheduled him to 10/11/24 with  at [place]. Prep instructions mailed to patient at confirmed address on file.

## 2024-09-19 DIAGNOSIS — Z23 NEED FOR PNEUMOCOCCAL VACCINATION: ICD-10-CM

## 2024-09-30 ENCOUNTER — OFFICE VISIT (OUTPATIENT)
Dept: DENTISTRY | Facility: CLINIC | Age: 59
End: 2024-09-30

## 2024-09-30 VITALS — HEART RATE: 90 BPM | DIASTOLIC BLOOD PRESSURE: 75 MMHG | SYSTOLIC BLOOD PRESSURE: 107 MMHG

## 2024-09-30 DIAGNOSIS — Z01.21 ENCOUNTER FOR DENTAL EXAMINATION AND CLEANING WITH ABNORMAL FINDINGS: Primary | ICD-10-CM

## 2024-09-30 PROCEDURE — D0220 INTRAORAL - PERIAPICAL FIRST RADIOGRAPHIC IMAGE: HCPCS

## 2024-09-30 PROCEDURE — D0150 COMPREHENSIVE ORAL EVALUATION - NEW OR ESTABLISHED PATIENT: HCPCS

## 2024-09-30 PROCEDURE — D0230 INTRAORAL - PERIAPICAL EACH ADDITIONAL RADIOGRAPHIC IMAGE: HCPCS

## 2024-09-30 NOTE — DENTAL PROCEDURE DETAILS
"Comprehensive Exam    Luis Manuel Moulton 59 y.o. male presents with self to [facility] for comprehensive exam.  PMH reviewed, no changes, ASA II. Significant medical history: reviewed. Significant allergies: NKA.   Pain level 0/10    Chief complaint:   \"Here for a checkup\"    Consent:  Reviewed procedures involved with comprehensive exam including radiographs, oral exam, and periodontal probing.   Patient understands and consent was given by self via verbal consent.    Radiographs: Select periapicals taken as patient is partially edentulous    Oral cancer screening: normal.  Extraoral exam: no remarkable findings.  Intraoral exam: missing teeth, existing restorations, fractured amalgam #20    Periodontal exam:  Hygiene - Poor.  Plaque - Moderate.  Horizontal bone loss -  UR: Moderate (15-33%).  UL: Moderate (15-33%).  LL: Moderate (15-33%).  LR: Moderate (15-33%).  Vertical bone loss - None.  Subgingival calculus - Generalized.  BOP - Generalized.  Furcation involvements - None.  Occlusal trauma - None.  Smoker - No.  Diabetic - No.  Periodontal Stage: Stage III  Periodontal Grade: B.  Periodontal Plan: SRP: UL, UR, LL, and LR on all remaining teeth    Caries exam:   Caries detected: #s 6 DL fractured amalgam, 20 DO, and 27 DL   Teeth with elevated chance of needing RCT: None  Likely nonrestorable teeth: None    Occlusal assessment:  VDO / restorative space - Slightly collapsed.    Other remarkable findings:  Pt has long roots- difficult to obtain radiographs slightly past apex. May need to take vertical PA for #15 at next appt- patient did not want to try for a third time and may be suspicious for perio lesion (possibly RCT re treatment) based off last PA  **Has current max and shaila cast metal partial dentures- pt would like a new set.    Tx plan:    Filippo #6 DL, #20 DO and #27 DL  SRP all remaining teeth  New cast metal partials max and shaila    Recommended recall schedule: 6 months.      Patient dismissed ambulatory " and alert.    NV: prabhu.    Attending: Dr. Pollack was present in clinic.

## 2024-10-02 ENCOUNTER — OFFICE VISIT (OUTPATIENT)
Dept: DENTISTRY | Facility: CLINIC | Age: 59
End: 2024-10-02

## 2024-10-02 VITALS — SYSTOLIC BLOOD PRESSURE: 106 MMHG | HEART RATE: 89 BPM | DIASTOLIC BLOOD PRESSURE: 73 MMHG

## 2024-10-02 DIAGNOSIS — K02.9 CARIES: Primary | ICD-10-CM

## 2024-10-02 PROCEDURE — D2392 RESIN-BASED COMPOSITE - 2 SURFACES, POSTERIOR: HCPCS

## 2024-10-02 NOTE — DENTAL PROCEDURE DETAILS
Patient presents for a dental restoration and verbally consents for treatment:  Reviewed health history-  Pt is ASA type II  Treatment consents signed: Yes  Perio: Periodontitis  Pain Scale: 0  Caries Assessment: High    Radiographs: Films are current  Oral Hygiene instruction reviewed and given  Hygiene recall visits recommended to the patient    Patient agrees with the diagnosis of Caries and the proposed treatment plan for the resin restoration:  Tooth ##20  Dental Anesthesia:  1.7 ml 2% lidocaine 1:100,000 epi.  Material:   Etch Ivoclar bond and resin   Shade: Shade A2    Prognosis is Good.   Referrals Needed: No  Next visit: Deep Cleaning

## 2024-10-10 RX ORDER — SODIUM CHLORIDE, SODIUM LACTATE, POTASSIUM CHLORIDE, CALCIUM CHLORIDE 600; 310; 30; 20 MG/100ML; MG/100ML; MG/100ML; MG/100ML
125 INJECTION, SOLUTION INTRAVENOUS CONTINUOUS
Status: CANCELLED | OUTPATIENT
Start: 2024-10-10

## 2024-10-10 RX ORDER — LIDOCAINE HYDROCHLORIDE 10 MG/ML
0.5 INJECTION, SOLUTION EPIDURAL; INFILTRATION; INTRACAUDAL; PERINEURAL ONCE AS NEEDED
Status: CANCELLED | OUTPATIENT
Start: 2024-10-10

## 2024-10-11 ENCOUNTER — ANESTHESIA EVENT (OUTPATIENT)
Dept: GASTROENTEROLOGY | Facility: HOSPITAL | Age: 59
End: 2024-10-11
Payer: MEDICARE

## 2024-10-11 ENCOUNTER — ANESTHESIA (OUTPATIENT)
Dept: GASTROENTEROLOGY | Facility: HOSPITAL | Age: 59
End: 2024-10-11
Payer: MEDICARE

## 2024-10-11 ENCOUNTER — HOSPITAL ENCOUNTER (OUTPATIENT)
Dept: GASTROENTEROLOGY | Facility: HOSPITAL | Age: 59
Setting detail: OUTPATIENT SURGERY
End: 2024-10-11
Attending: COLON & RECTAL SURGERY
Payer: MEDICARE

## 2024-10-11 VITALS
BODY MASS INDEX: 26.66 KG/M2 | OXYGEN SATURATION: 95 % | HEIGHT: 69 IN | DIASTOLIC BLOOD PRESSURE: 61 MMHG | TEMPERATURE: 97 F | HEART RATE: 90 BPM | WEIGHT: 180 LBS | RESPIRATION RATE: 20 BRPM | SYSTOLIC BLOOD PRESSURE: 114 MMHG

## 2024-10-11 DIAGNOSIS — Z86.0100 HISTORY OF COLON POLYPS: ICD-10-CM

## 2024-10-11 PROCEDURE — 45385 COLONOSCOPY W/LESION REMOVAL: CPT | Performed by: COLON & RECTAL SURGERY

## 2024-10-11 PROCEDURE — 88305 TISSUE EXAM BY PATHOLOGIST: CPT | Performed by: PATHOLOGY

## 2024-10-11 RX ORDER — PHENYLEPHRINE HCL IN 0.9% NACL 1 MG/10 ML
SYRINGE (ML) INTRAVENOUS AS NEEDED
Status: DISCONTINUED | OUTPATIENT
Start: 2024-10-11 | End: 2024-10-11

## 2024-10-11 RX ORDER — PROPOFOL 10 MG/ML
INJECTION, EMULSION INTRAVENOUS CONTINUOUS PRN
Status: DISCONTINUED | OUTPATIENT
Start: 2024-10-11 | End: 2024-10-11

## 2024-10-11 RX ORDER — LIDOCAINE HYDROCHLORIDE 10 MG/ML
INJECTION, SOLUTION EPIDURAL; INFILTRATION; INTRACAUDAL; PERINEURAL AS NEEDED
Status: DISCONTINUED | OUTPATIENT
Start: 2024-10-11 | End: 2024-10-11

## 2024-10-11 RX ORDER — SODIUM CHLORIDE, SODIUM LACTATE, POTASSIUM CHLORIDE, CALCIUM CHLORIDE 600; 310; 30; 20 MG/100ML; MG/100ML; MG/100ML; MG/100ML
125 INJECTION, SOLUTION INTRAVENOUS CONTINUOUS
Status: DISCONTINUED | OUTPATIENT
Start: 2024-10-11 | End: 2024-10-15 | Stop reason: HOSPADM

## 2024-10-11 RX ORDER — PROPOFOL 10 MG/ML
INJECTION, EMULSION INTRAVENOUS AS NEEDED
Status: DISCONTINUED | OUTPATIENT
Start: 2024-10-11 | End: 2024-10-11

## 2024-10-11 RX ORDER — LIDOCAINE HYDROCHLORIDE 10 MG/ML
0.5 INJECTION, SOLUTION EPIDURAL; INFILTRATION; INTRACAUDAL; PERINEURAL ONCE AS NEEDED
Status: DISCONTINUED | OUTPATIENT
Start: 2024-10-11 | End: 2024-10-15 | Stop reason: HOSPADM

## 2024-10-11 RX ADMIN — Medication 100 MCG: at 08:34

## 2024-10-11 RX ADMIN — Medication 100 MCG: at 08:42

## 2024-10-11 RX ADMIN — Medication 100 MCG: at 08:33

## 2024-10-11 RX ADMIN — LIDOCAINE HYDROCHLORIDE 30 MG: 10 INJECTION, SOLUTION EPIDURAL; INFILTRATION; INTRACAUDAL; PERINEURAL at 08:26

## 2024-10-11 RX ADMIN — PROPOFOL 80 MG: 10 INJECTION, EMULSION INTRAVENOUS at 08:27

## 2024-10-11 RX ADMIN — SODIUM CHLORIDE, SODIUM LACTATE, POTASSIUM CHLORIDE, AND CALCIUM CHLORIDE: .6; .31; .03; .02 INJECTION, SOLUTION INTRAVENOUS at 08:30

## 2024-10-11 RX ADMIN — Medication 100 MCG: at 08:46

## 2024-10-11 RX ADMIN — PROPOFOL 100 MCG/KG/MIN: 10 INJECTION, EMULSION INTRAVENOUS at 08:27

## 2024-10-11 NOTE — ANESTHESIA PREPROCEDURE EVALUATION
Procedure:  COLONOSCOPY    Relevant Problems   CARDIO   (+) Hypertriglyceridemia      ENDO   (+) Hypothyroidism      GI/HEPATIC   (+) Gastroesophageal reflux disease without esophagitis      MUSCULOSKELETAL   (+) Bilateral low back pain without sciatica   (+) Diastasis recti      NEURO/PSYCH   (+) Depression with anxiety        Physical Exam    Airway    Mallampati score: IV  TM Distance: >3 FB  Neck ROM: full     Dental    upper dentures    Cardiovascular  Rhythm: regular, Rate: normal, Cardiovascular exam normal    Pulmonary  Pulmonary exam normal Breath sounds clear to auscultation    Other Findings        Anesthesia Plan  ASA Score- 2     Anesthesia Type- IV sedation with anesthesia with ASA Monitors.         Additional Monitors:     Airway Plan:     Comment: Discussed risks/benefits, including medication reactions, awareness, aspiration, and serious/life threatening complications. Plan to maintain native airway with IVGA, monitored with EtCO2.       Plan Factors-Exercise tolerance (METS): >4 METS.    Chart reviewed.    Patient summary reviewed.      Patient instructed to abstain from smoking on day of procedure. Patient did not smoke on day of surgery.            Induction- intravenous.    Postoperative Plan-         Informed Consent- Anesthetic plan and risks discussed with patient.  I personally reviewed this patient with the CRNA. Discussed and agreed on the Anesthesia Plan with the CRNA..

## 2024-10-11 NOTE — ANESTHESIA POSTPROCEDURE EVALUATION
Post-Op Assessment Note    CV Status:  Stable  Pain Score: 0    Pain management: adequate       Mental Status:  Alert and awake   Hydration Status:  Euvolemic   PONV Controlled:  Controlled   Airway Patency:  Patent     Post Op Vitals Reviewed: Yes    No anethesia notable event occurred.    Staff: CRNA           Last Filed PACU Vitals:  Vitals Value Taken Time   Temp     Pulse     BP     Resp     SpO2         Modified Ashlyn:  Activity: 2 (10/11/2024  7:20 AM)  Respiration: 2 (10/11/2024  7:20 AM)  Circulation: 2 (10/11/2024  7:20 AM)  Consciousness: 2 (10/11/2024  7:20 AM)  Oxygen Saturation: 2 (10/11/2024  7:20 AM)  Modified Ashlyn Score: 10 (10/11/2024  7:20 AM)

## 2024-10-11 NOTE — H&P
History and Physical   Colon and Rectal Surgery   Luis Manuel Moulton 59 y.o. male MRN: [id number]  Unit/Bed#:  Encounter: [id number]  10/11/24   8:12 AM      CC:  History of polyps    History of Present Illness   HPI:  Luis Manuel Moulton is a 59 y.o. male with no GI symptoms.  Historical Information   Past Medical History:   Diagnosis Date    Anxiety     Bipolar disorder (HCC)     Cardiomyopathy (HCC)     Depression     GERD (gastroesophageal reflux disease)     Glaucoma     Hiatal hernia     Hyperlipidemia     Hyperthyroidism     Thyroid disease      Past Surgical History:   Procedure Laterality Date    CHOLECYSTECTOMY      CHOLECYSTECTOMY LAPAROSCOPIC      last assessed: 4/23/15    COLONOSCOPY      EYE SURGERY      NH COLONOSCOPY FLX DX W/COLLJ SPEC WHEN PFRMD N/A 4/4/2019    Procedure: COLONOSCOPY;  Surgeon: Virgil Gómez MD;  Location: [facility];  Service: Gastroenterology    NH ESOPHAGOGASTRODUODENOSCOPY TRANSORAL DIAGNOSTIC N/A 4/4/2019    Procedure: ESOPHAGOGASTRODUODENOSCOPY (EGD);  Surgeon: Virgil Gómez MD;  Location: [facility];  Service: Gastroenterology    UPPER GASTROINTESTINAL ENDOSCOPY         Meds/Allergies     Not in a hospital admission.      Current Outpatient Medications:     latanoprost (XALATAN) 0.005 % ophthalmic solution, Apply 1 drop to eye daily, Disp: , Rfl:     levothyroxine 125 mcg tablet, Take 1 tablet (125 mcg total) by mouth daily, Disp: 90 tablet, Rfl: 3    pantoprazole (PROTONIX) 40 mg tablet, Take 1 tablet (40 mg total) by mouth daily, Disp: 30 tablet, Rfl: 2    QUEtiapine (SEROquel) 100 mg tablet, AMRITA 1 TABLETA POR VIA ORAL DIARIAMENTE ANTES DE DORMIR EN LA NOCHE, Disp: 30 tablet, Rfl: 1    timolol (TIMOPTIC) 0.5 % ophthalmic solution, Apply 1 drop to eye every morning, Disp: 10 mL, Rfl: 3    traZODone (DESYREL) 100 mg tablet, AMRITA 1 TABLETA POR VIA ORAL DIARIAMENTE ANTES DE DORMIR EN LA NOCHE, Disp: 90 tablet, Rfl: 1    vitamin B-12 (CYANOCOBALAMIN) 250 MCG tablet, Take 1  tablet (250 mcg total) by mouth daily, Disp: 90 tablet, Rfl: 3    acetaminophen (TYLENOL) 325 mg tablet, Take 2 tablets (650 mg total) by mouth every 6 (six) hours as needed for mild pain, Disp: 90 tablet, Rfl: 0    atorvastatin (LIPITOR) 20 mg tablet, TAKE 1 TABLET (20 MG TOTAL) BY MOUTH DAILY, AMRITA 1 TABLETA POR VIA ORAL DIARIAMENTE, Disp: 30 tablet, Rfl: 3    brimonidine (ALPHAGAN P) 0.15 % ophthalmic solution, Apply 1 drop to eye 2 (two) times a day, Disp: 10 mL, Rfl: 5    carbamide peroxide (DEBROX) 6.5 % otic solution, Administer 5 drops into both ears 2 (two) times a day, Disp: 15 mL, Rfl: 2    cholecalciferol (VITAMIN D3) 25 mcg (1,000 units) tablet, AMRITA 1 TABLETA POR VIA ORAL DIARIAMENTE, Disp: 90 tablet, Rfl: 5    Diclofenac Sodium (VOLTAREN) 1 %, APPLY 2 G TOPICALLY 4 (FOUR) TIMES A DAY, Disp: 100 g, Rfl: 3    famotidine (PEPCID) 20 mg tablet, Take 1 tablet (20 mg total) by mouth daily at bedtime as needed for heartburn, Disp: 60 tablet, Rfl: 2    fluticasone (FLONASE) 50 mcg/act nasal spray, USE 2 SPRAYS INTO EACH NOSTRIL DAILY, Disp: 16 g, Rfl: 3    ibuprofen (MOTRIN) 600 mg tablet, Take 1 tablet (600 mg total) by mouth every 6 (six) hours as needed for mild pain for up to 8 days, Disp: 30 tablet, Rfl: 0    polyethylene glycol (MIRALAX) 17 g packet, Take 17 g by mouth daily as needed (constipation), Disp: 90 each, Rfl: 0    simethicone (MYLICON,GAS-X) 125 MG CAPS, Take 1 capsule (125 mg total) by mouth every 8 (eight) hours as needed for flatulence (stomach gas), Disp: 90 capsule, Rfl: 1    Current Facility-Administered Medications:     lactated ringers infusion, 125 mL/hr, Intravenous, Continuous, Sami English MD    lidocaine (PF) (XYLOCAINE-MPF) 1 % injection 0.5 mL, 0.5 mL, Infiltration, Once PRN, Sami English MD    No Known Allergies      Social History   Social History     Substance and Sexual Activity   Alcohol Use No     Social History     Substance and Sexual Activity   Drug Use No  "    Social History     Tobacco Use   Smoking Status Never   Smokeless Tobacco Never         Family History:   Family History   Problem Relation Age of Onset    Diabetes Mother     Hyperlipidemia Mother     Thyroid cancer Mother     Dementia Father     Coronary artery disease Maternal Grandmother     Hypertension Maternal Grandmother     Heart attack Maternal Grandmother          Objective     Current Vitals:   Blood Pressure: 111/61 (10/11/24 0719)  Pulse: 75 (10/11/24 0719)  Temperature: (!) 97.2 °F (36.2 °C) (10/11/24 0719)  Temp Source: Tympanic (10/11/24 0719)  Respirations: 18 (10/11/24 0719)  Height: 5' 9\" (175.3 cm) (10/11/24 0719)  Weight - Scale: 81.6 kg (180 lb) (10/11/24 0719)  SpO2: 97 % (10/11/24 0719)  No intake or output data in the 24 hours ending 10/11/24 0812    Physical Exam:  General:  Well nourished, no distress.  Neuro: Alert and oriented  Eyes:Sclera anicteric, conjunctiva pink.  Pulm: Clear to auscultation bilaterally. No respiratory Distress.   CV:  Regular rate and rhythm. No murmurs.  Abdomen:  Soft, flat, non-tender, without masses or hepatosplenomegaly.    Lab Results:       ASSESSMENT:  Luis Manuel Moulton is a 59 y.o. male for surveillance.  PLAN:  Colonoscopy.  Risks , including, but not limited to, bleeding, perforation, missed lesions, and potential need for surgery, were reviewed. Alternatives to colonoscopy were discussed.  ABIOLA Barajas MD  "

## 2024-10-11 NOTE — ANESTHESIA POSTPROCEDURE EVALUATION
Post-Op Assessment Note    CV Status:  Stable  Pain Score: 0    Pain management: adequate       Mental Status:  Awake and sleepy   Hydration Status:  Stable   PONV Controlled:  None   Airway Patency:  Patent     Post Op Vitals Reviewed: Yes    No anethesia notable event occurred.    Staff: Anesthesiologist           Last Filed PACU Vitals:  Vitals Value Taken Time   Temp 97 °F (36.1 °C) 10/11/24 0850   Pulse 90 10/11/24 0905   /61 10/11/24 0905   Resp 20 10/11/24 0905   SpO2 95 % 10/11/24 0905       Modified Ashlyn:  Activity: 2 (10/11/2024  9:04 AM)  Respiration: 2 (10/11/2024  9:04 AM)  Circulation: 2 (10/11/2024  9:04 AM)  Consciousness: 2 (10/11/2024  9:04 AM)  Oxygen Saturation: 2 (10/11/2024  9:04 AM)  Modified Ashlyn Score: 10 (10/11/2024  9:04 AM)

## 2024-10-16 PROCEDURE — 88305 TISSUE EXAM BY PATHOLOGIST: CPT | Performed by: PATHOLOGY

## 2024-10-17 DIAGNOSIS — J30.9 ALLERGIC RHINITIS: ICD-10-CM

## 2024-10-17 RX ORDER — FLUTICASONE PROPIONATE 50 MCG
SPRAY, SUSPENSION (ML) NASAL
Qty: 16 G | Refills: 3 | Status: SHIPPED | OUTPATIENT
Start: 2024-10-17

## 2024-10-25 DIAGNOSIS — Z23 NEED FOR PNEUMOCOCCAL VACCINATION: ICD-10-CM

## 2024-10-31 DIAGNOSIS — K21.9 GASTROESOPHAGEAL REFLUX DISEASE WITHOUT ESOPHAGITIS: ICD-10-CM

## 2024-10-31 RX ORDER — FAMOTIDINE 20 MG/1
20 TABLET, FILM COATED ORAL
Qty: 60 TABLET | Refills: 2 | Status: SHIPPED | OUTPATIENT
Start: 2024-10-31 | End: 2025-04-29

## 2024-11-04 DIAGNOSIS — E78.00 PURE HYPERCHOLESTEROLEMIA: ICD-10-CM

## 2024-11-04 RX ORDER — ATORVASTATIN CALCIUM 20 MG/1
20 TABLET, FILM COATED ORAL DAILY
Qty: 30 TABLET | Refills: 3 | Status: SHIPPED | OUTPATIENT
Start: 2024-11-04

## 2024-12-12 DIAGNOSIS — Z23 NEED FOR PNEUMOCOCCAL VACCINATION: ICD-10-CM

## 2025-01-10 ENCOUNTER — OFFICE VISIT (OUTPATIENT)
Dept: DENTISTRY | Facility: CLINIC | Age: 60
End: 2025-01-10

## 2025-01-10 DIAGNOSIS — K02.62 CARIES OF DENTIN: Primary | ICD-10-CM

## 2025-01-10 PROCEDURE — D2331 RESIN-BASED COMPOSITE - 2 SURFACES, ANTERIOR: HCPCS

## 2025-01-13 NOTE — PROGRESS NOTES
Composite Restoration #6 DL    Luis Manuel Caserileyenriqueta 59 y.o. male presents with self to [facility] for composite restoration  PMH reviewed, no changes, ASA II. Significant medical history: Reviewed. Significant allergies: NKDA. Significant medications: N/A.    Diagnosis:  Secondary caries under existing resin #6 DL    Prognosis:  good    Consent:  Risks of specific procedure: need for RCT if pulp exposure occurs or in future if pulp is inflamed, need to revise tx plan based on extent of decay, damage to adjacent tooth and/or restoration.  Risks of any dental procedure: post procedural pain or sensitivity, local anesthetic side effects, allergic reaction to dental materials and medications, breakage of local anesthetic needle, aspiration of small dental tools, injury to nearby hard and soft tissues and anatomical structures.  Benefits: prevent further breakdown of tooth and its sequelae.  Alternatives: No tx.  Tx plan for composite restoration #6 DL reviewed. Opportunity to ask questions given, all questions answered to degree of medical and dental certainty.  Patient understands and consent given by self via verbal consent.    Anesthesia:  Topical 20% benzocaine.  1 carps 2% Lidocaine 1:100k epi via buccal infiltration.    Procedure details:  Isolation: cotton rolls and high volume suction  Prepped tooth #6 DL with high speed handpiece.  Caries removed with round carbide on slow speed.  Band placement: mylar strip and wedge.   Etch with 37% H2PO4 15 seconds. Rinsed and suctioned.  Applied  with 20 second scrub, air dried, and light cured.  Restored with packable (A2 shade) and light cured.  Checked occlusion and adjusted with finishing burs.  Checked contacts with floss  Polished with white stone burs.  Verified occlusion and contacts.    Patient dismissed ambulatory and alert.    NV: 1/27/25 for SRPs.    Attending: Dr. Pollack was present in clinic.

## 2025-01-17 DIAGNOSIS — Z23 NEED FOR PNEUMOCOCCAL VACCINATION: ICD-10-CM

## 2025-01-17 DIAGNOSIS — K21.9 GASTROESOPHAGEAL REFLUX DISEASE WITHOUT ESOPHAGITIS: ICD-10-CM

## 2025-01-20 RX ORDER — FAMOTIDINE 20 MG/1
20 TABLET, FILM COATED ORAL
Qty: 60 TABLET | Refills: 2 | Status: SHIPPED | OUTPATIENT
Start: 2025-01-20 | End: 2025-07-19

## 2025-01-27 ENCOUNTER — OFFICE VISIT (OUTPATIENT)
Dept: DENTISTRY | Facility: CLINIC | Age: 60
End: 2025-01-27

## 2025-01-27 VITALS — SYSTOLIC BLOOD PRESSURE: 96 MMHG | DIASTOLIC BLOOD PRESSURE: 65 MMHG

## 2025-01-27 DIAGNOSIS — K05.4 PERIODONTOSIS: Primary | ICD-10-CM

## 2025-01-27 PROCEDURE — D4342 PERIODONTAL SCALING AND ROOT PLANING - 1 TO 3 TEETH PER QUADRANT: HCPCS

## 2025-01-27 NOTE — PROGRESS NOTES
Procedure Details  3,5,6  - PERIODONTAL SCALING AND ROOT PLANING - 1 TO 3 TEETH PER QUADRANT  Lao translation #[id number]    SCALE AND RP UR #3,5,6   Pt arrived to [facility] for initial Srp apt.   Applied 20% benzocaine gel   Adminitstered  1 carpule/s 1.7mL given - 4% Septocaine 1:100K epi infiltrations  CHIEF CONCERN: none   PAIN SCALE: 0  ASA CLASS: ASA 2 - Patient with mild systemic disease with no functional limitations  Heavy calculus present, heavy bldg.   Hand scaled, polished and flossed. Used cavitron    Oral Hygiene Instruction:  recommended brushing 2 x daily for 2 minutes MIN, recommended flossing daily, reviewed dietary precautions. Post op sc/rp instructions placed in AVS, printed and handed/ reviewed with patient.     Post op subgingival irrigation Chlorhexidine  Post op instructions no eating, nothing hot until numbness subsides. Pt understood    REFERRALS: no referrals provided  Pt tolerated well and left in good condition       NEXT VISIT:   --->sc/rp LR-SS   UL SRP full quad  LL SRP full quad    Last BWX:   Last Panorex/ FMX :

## 2025-01-27 NOTE — DENTAL PROCEDURE DETAILS
American translation #[id number]    SCALE AND RP UR #3,5,6   Pt arrived to [facility] for initial Srp apt.   Applied 20% benzocaine gel   Adminitstered  1 carpule/s 1.7mL given - 4% Septocaine 1:100K epi infiltrations  CHIEF CONCERN: none   PAIN SCALE: 0  ASA CLASS: ASA 2 - Patient with mild systemic disease with no functional limitations  Heavy calculus present, heavy bldg.   Hand scaled, polished and flossed. Used cavitron    Oral Hygiene Instruction:  recommended brushing 2 x daily for 2 minutes MIN, recommended flossing daily, reviewed dietary precautions. Post op sc/rp instructions placed in AVS, printed and handed/ reviewed with patient.     Post op subgingival irrigation Chlorhexidine  Post op instructions no eating, nothing hot until numbness subsides. Pt understood    REFERRALS: no referrals provided  Pt tolerated well and left in good condition       NEXT VISIT:   --->sc/rp LR-SS   UL SRP full quad  LL SRP full quad    Last BWX:   Last Panorex/ FMX :

## 2025-01-29 ENCOUNTER — OFFICE VISIT (OUTPATIENT)
Dept: DENTISTRY | Facility: CLINIC | Age: 60
End: 2025-01-29

## 2025-01-29 DIAGNOSIS — K05.10: Primary | ICD-10-CM

## 2025-01-29 DIAGNOSIS — K05.4 PERIODONTOSIS: ICD-10-CM

## 2025-01-29 DIAGNOSIS — J30.9 ALLERGIC RHINITIS: ICD-10-CM

## 2025-01-29 PROCEDURE — D9910 APPLICATION OF DESENSITIZING MEDICAMENT: HCPCS | Performed by: DENTIST

## 2025-01-29 PROCEDURE — D0140 LIMITED ORAL EVALUATION - PROBLEM FOCUSED: HCPCS | Performed by: DENTIST

## 2025-01-29 RX ORDER — CHLORHEXIDINE GLUCONATE ORAL RINSE 1.2 MG/ML
15 SOLUTION DENTAL 2 TIMES DAILY
Qty: 120 ML | Refills: 0 | Status: SHIPPED | OUTPATIENT
Start: 2025-01-29

## 2025-01-29 NOTE — PROGRESS NOTES
Procedure Details  3,5,6  - APPLICATION OF DESENSITIZING MEDICAMENT   - LIMITED ORAL EVALUATION - PROBLEM FOCUSED    Moldovan translation #[id number]    Medical history reviewed. ASA II. Pain scale 5 when drinking cold liquids. Patient presents complaining of cold sensitivity on the upper right after his recent cleaning. Patient pointed to the upper right teeth, indicating the area he was having pain. Patient was informed that sensitivity post scaling and root planing is common. Patient also had a feeling that the gums and mucosa were rough or damaged in the area of the scaling.    3, 5, and 6 was indicated by the patient as the sensitive area. No lacerations or wounds observed on the gingiva or surrounding mucosa or lip. To help with the sensitivity, it was offered to apply Gluma to the site, with the understanding that it will take time to help reduce sensitivity. Patient agreed to treatment.    Gluma was applied to 3, 5, and 6 root surfaces with a micro brush for 30 seconds followed by a water rinse and reapplication for another 30 seconds, following by a water rinse. Patient reported a slight improvement from before.    Patient was instructed to apply sensitive toothpaste to the area and spit out the extra, leaving a smear of toothpaste on the area overnight for 2 weeks. Additionally, Chlorhexidine (0.12%) prescribed and recommended to use 2 times a day for 2 weeks.     Moldovan translation services were used to communicate with the patient.

## 2025-01-29 NOTE — DENTAL PROCEDURE DETAILS
Namibian translation #[id number]    Medical history reviewed. ASA II. Pain scale 5 when drinking cold liquids. Patient presents complaining of cold sensitivity on the upper right after his recent cleaning. Patient pointed to the upper right teeth, indicating the area he was having pain. Patient was informed that sensitivity post scaling and root planing is common. Patient also had a feeling that the gums and mucosa were rough or damaged in the area of the scaling.    3, 5, and 6 was indicated by the patient as the sensitive area. No lacerations or wounds observed on the gingiva or surrounding mucosa or lip. To help with the sensitivity, it was offered to apply Gluma to the site, with the understanding that it will take time to help reduce sensitivity. Patient agreed to treatment.    Gluma was applied to 3, 5, and 6 root surfaces with a micro brush for 30 seconds followed by a water rinse and reapplication for another 30 seconds, following by a water rinse. Patient reported a slight improvement from before.    Patient was instructed to apply sensitive toothpaste to the area and spit out the extra, leaving a smear of toothpaste on the area overnight for 2 weeks. Additionally, Chlorhexidine (0.12%) prescribed and recommended to use 2 times a day for 2 weeks.     Namibian translation services were used to communicate with the patient.

## 2025-01-30 RX ORDER — FLUTICASONE PROPIONATE 50 MCG
2 SPRAY, SUSPENSION (ML) NASAL DAILY
Qty: 16 G | Refills: 3 | Status: SHIPPED | OUTPATIENT
Start: 2025-01-30

## 2025-02-18 DIAGNOSIS — E78.00 PURE HYPERCHOLESTEROLEMIA: ICD-10-CM

## 2025-02-18 RX ORDER — ATORVASTATIN CALCIUM 20 MG/1
20 TABLET, FILM COATED ORAL DAILY
Qty: 90 TABLET | Refills: 3 | Status: SHIPPED | OUTPATIENT
Start: 2025-02-18

## 2025-02-25 DIAGNOSIS — Z23 NEED FOR PNEUMOCOCCAL VACCINATION: ICD-10-CM

## 2025-03-05 ENCOUNTER — APPOINTMENT (OUTPATIENT)
Dept: LAB | Facility: CLINIC | Age: 60
End: 2025-03-05
Payer: MEDICARE

## 2025-03-05 DIAGNOSIS — E78.1 HYPERTRIGLYCERIDEMIA: ICD-10-CM

## 2025-03-05 DIAGNOSIS — E03.9 HYPOTHYROIDISM, UNSPECIFIED TYPE: ICD-10-CM

## 2025-03-05 DIAGNOSIS — Z11.4 SCREENING FOR HIV (HUMAN IMMUNODEFICIENCY VIRUS): ICD-10-CM

## 2025-03-05 LAB
ALBUMIN SERPL BCG-MCNC: 3.7 G/DL (ref 3.5–5)
ALP SERPL-CCNC: 63 U/L (ref 34–104)
ALT SERPL W P-5'-P-CCNC: 31 U/L (ref 7–52)
ANION GAP SERPL CALCULATED.3IONS-SCNC: 7 MMOL/L (ref 4–13)
AST SERPL W P-5'-P-CCNC: 19 U/L (ref 13–39)
BILIRUB SERPL-MCNC: 0.32 MG/DL (ref 0.2–1)
BUN SERPL-MCNC: 20 MG/DL (ref 5–25)
CALCIUM SERPL-MCNC: 9.3 MG/DL (ref 8.4–10.2)
CHLORIDE SERPL-SCNC: 103 MMOL/L (ref 96–108)
CHOLEST SERPL-MCNC: 218 MG/DL (ref ?–200)
CO2 SERPL-SCNC: 29 MMOL/L (ref 21–32)
CREAT SERPL-MCNC: 1.21 MG/DL (ref 0.6–1.3)
ERYTHROCYTE [DISTWIDTH] IN BLOOD BY AUTOMATED COUNT: 13.7 % (ref 11.6–15.1)
GFR SERPL CREATININE-BSD FRML MDRD: 64 ML/MIN/1.73SQ M
GLUCOSE P FAST SERPL-MCNC: 92 MG/DL (ref 65–99)
HCT VFR BLD AUTO: 46.6 % (ref 36.5–49.3)
HDLC SERPL-MCNC: 38 MG/DL
HGB BLD-MCNC: 14.9 G/DL (ref 12–17)
HIV 1+2 AB+HIV1 P24 AG SERPL QL IA: NORMAL
MCH RBC QN AUTO: 28.3 PG (ref 26.8–34.3)
MCHC RBC AUTO-ENTMCNC: 32 G/DL (ref 31.4–37.4)
MCV RBC AUTO: 88 FL (ref 82–98)
NONHDLC SERPL-MCNC: 180 MG/DL
PLATELET # BLD AUTO: 183 THOUSANDS/UL (ref 149–390)
PMV BLD AUTO: 10.6 FL (ref 8.9–12.7)
POTASSIUM SERPL-SCNC: 3.8 MMOL/L (ref 3.5–5.3)
PROLACTIN SERPL-MCNC: 32.01 NG/ML (ref 2.64–13.13)
PROT SERPL-MCNC: 6.6 G/DL (ref 6.4–8.4)
RBC # BLD AUTO: 5.27 MILLION/UL (ref 3.88–5.62)
SODIUM SERPL-SCNC: 139 MMOL/L (ref 135–147)
T4 FREE SERPL-MCNC: 0.78 NG/DL (ref 0.61–1.12)
TRIGL SERPL-MCNC: 522 MG/DL (ref ?–150)
TSH SERPL DL<=0.05 MIU/L-ACNC: 7 UIU/ML (ref 0.45–4.5)
WBC # BLD AUTO: 6.4 THOUSAND/UL (ref 4.31–10.16)

## 2025-03-05 PROCEDURE — 84146 ASSAY OF PROLACTIN: CPT

## 2025-03-05 PROCEDURE — 85027 COMPLETE CBC AUTOMATED: CPT

## 2025-03-05 PROCEDURE — 84443 ASSAY THYROID STIM HORMONE: CPT

## 2025-03-05 PROCEDURE — 87389 HIV-1 AG W/HIV-1&-2 AB AG IA: CPT

## 2025-03-05 PROCEDURE — 80061 LIPID PANEL: CPT

## 2025-03-05 PROCEDURE — 84439 ASSAY OF FREE THYROXINE: CPT

## 2025-03-05 PROCEDURE — 80053 COMPREHEN METABOLIC PANEL: CPT

## 2025-03-05 PROCEDURE — 36415 COLL VENOUS BLD VENIPUNCTURE: CPT

## 2025-03-19 ENCOUNTER — OFFICE VISIT (OUTPATIENT)
Dept: INTERNAL MEDICINE CLINIC | Facility: CLINIC | Age: 60
End: 2025-03-19

## 2025-03-19 VITALS
HEART RATE: 89 BPM | DIASTOLIC BLOOD PRESSURE: 83 MMHG | SYSTOLIC BLOOD PRESSURE: 120 MMHG | HEIGHT: 69 IN | TEMPERATURE: 98.5 F | WEIGHT: 183 LBS | BODY MASS INDEX: 27.11 KG/M2

## 2025-03-19 DIAGNOSIS — E03.9 HYPOTHYROIDISM: ICD-10-CM

## 2025-03-19 DIAGNOSIS — R79.89 ELEVATED PROLACTIN LEVEL: ICD-10-CM

## 2025-03-19 DIAGNOSIS — M25.512 ACUTE PAIN OF LEFT SHOULDER: Primary | ICD-10-CM

## 2025-03-19 DIAGNOSIS — E78.2 MIXED HYPERLIPIDEMIA: ICD-10-CM

## 2025-03-19 DIAGNOSIS — M25.532 LEFT WRIST PAIN: ICD-10-CM

## 2025-03-19 DIAGNOSIS — H40.9 GLAUCOMA OF BOTH EYES, UNSPECIFIED GLAUCOMA TYPE: ICD-10-CM

## 2025-03-19 PROBLEM — R10.13 EPIGASTRIC ABDOMINAL PAIN: Status: RESOLVED | Noted: 2018-05-06 | Resolved: 2025-03-19

## 2025-03-19 PROBLEM — R00.2 PALPITATIONS: Status: RESOLVED | Noted: 2024-04-30 | Resolved: 2025-03-19

## 2025-03-19 PROBLEM — L72.0 EPIDERMOID CYST: Status: RESOLVED | Noted: 2019-10-21 | Resolved: 2025-03-19

## 2025-03-19 PROBLEM — L72.3 SEBACEOUS CYST: Status: RESOLVED | Noted: 2019-10-14 | Resolved: 2025-03-19

## 2025-03-19 PROBLEM — K92.1 MELENA: Status: RESOLVED | Noted: 2019-01-23 | Resolved: 2025-03-19

## 2025-03-19 PROBLEM — K92.1 HEMATOCHEZIA: Status: RESOLVED | Noted: 2019-01-23 | Resolved: 2025-03-19

## 2025-03-19 PROBLEM — D36.9 TUBULAR ADENOMA: Status: RESOLVED | Noted: 2019-10-08 | Resolved: 2025-03-19

## 2025-03-19 PROCEDURE — G2211 COMPLEX E/M VISIT ADD ON: HCPCS | Performed by: PHYSICIAN ASSISTANT

## 2025-03-19 PROCEDURE — 99214 OFFICE O/P EST MOD 30 MIN: CPT | Performed by: PHYSICIAN ASSISTANT

## 2025-03-19 RX ORDER — BRIMONIDINE TARTRATE 1.5 MG/ML
1 SOLUTION/ DROPS OPHTHALMIC 2 TIMES DAILY
Qty: 10 ML | Refills: 0 | Status: SHIPPED | OUTPATIENT
Start: 2025-03-19

## 2025-03-19 RX ORDER — LATANOPROST 50 UG/ML
1 SOLUTION/ DROPS OPHTHALMIC
COMMUNITY

## 2025-03-19 RX ORDER — LEVOTHYROXINE SODIUM 125 UG/1
125 TABLET ORAL DAILY
Qty: 90 TABLET | Refills: 0 | Status: SHIPPED | OUTPATIENT
Start: 2025-03-19

## 2025-03-19 RX ORDER — TIMOLOL MALEATE 5 MG/ML
1 SOLUTION/ DROPS OPHTHALMIC EVERY MORNING
Qty: 10 ML | Refills: 0 | Status: SHIPPED | OUTPATIENT
Start: 2025-03-19

## 2025-03-19 RX ORDER — ATORVASTATIN CALCIUM 40 MG/1
40 TABLET, FILM COATED ORAL DAILY
Qty: 90 TABLET | Refills: 0 | Status: SHIPPED | OUTPATIENT
Start: 2025-03-19

## 2025-03-19 RX ORDER — ACETAMINOPHEN 325 MG/1
650 TABLET ORAL EVERY 6 HOURS PRN
Qty: 90 TABLET | Refills: 0 | Status: SHIPPED | OUTPATIENT
Start: 2025-03-19 | End: 2025-03-27

## 2025-03-19 NOTE — ASSESSMENT & PLAN NOTE
Lab Results   Component Value Date    CHOLESTEROL 218 (H) 03/05/2025    CHOLESTEROL 209 (H) 07/12/2022    CHOLESTEROL 207 (H) 09/16/2021     Lab Results   Component Value Date    HDL 38 (L) 03/05/2025    HDL 42 07/12/2022    HDL 38 (L) 09/16/2021     Lab Results   Component Value Date    TRIG 522 (H) 03/05/2025    TRIG 329 (H) 07/12/2022    TRIG 394 (H) 09/16/2021     Lab Results   Component Value Date    NONHDLC 180 03/05/2025    NONHDLC 167 07/12/2022    NONHDLC 169 09/16/2021      Lab Results   Component Value Date    LDLCALC  03/05/2025      Comment:      Calculated LDL invalid, triglycerides >400 mg/dl  This screening LDL is a calculated result.   It does not have the accuracy of the Direct Measured LDL in the monitoring of patients with hyperlipidemia and/or statin therapy.   Direct Measure LDL (LEA611) must be ordered separately in these patients.      Patient states he has been adherent with his atorvastatin. Will increase to 40 mg once daily. Reviewed low fat diet.   Orders:    atorvastatin (LIPITOR) 40 mg tablet; Take 1 tablet (40 mg total) by mouth daily AMRITA 1 TABLETA POR VIA ORAL DIARIAMENTE

## 2025-03-19 NOTE — ASSESSMENT & PLAN NOTE
Lab Results   Component Value Date    MKA9CBXQOLBD 6.997 (H) 03/05/2025      TSH slightly high, T4 normal. Reviewed adherence with medication and ensure taking on an empty stomach in the morning and do not eat/drink for at least 1 hour. Continue levothyroxine 125 mcg once daily. Recommend recheck in 8 weeks.   Orders:    levothyroxine 125 mcg tablet; Take 1 tablet (125 mcg total) by mouth daily

## 2025-03-19 NOTE — ASSESSMENT & PLAN NOTE
Prolactin levels have been elevated. Recommend discussing with psychiatry. May nee endo consult.

## 2025-03-19 NOTE — PROGRESS NOTES
Name: Luis Manuel Moulton      : 1965      MRN: [id number]  Encounter Provider: Anayeli Chan PA-C  Encounter Date: 3/19/2025   Encounter department: [facility] [place]  :  Assessment & Plan  Acute pain of left shoulder  Recommend x-ray to evaluate further and rule out fracture. He was agreeable. Continue supportive treatment. Warm/cool compresses. Activity as tolerated. Tylenol as needed. Diclofenac gel as needed. Declines ortho referral. Follow up pending results.   Orders:    XR shoulder 2+ vw left; Future    Diclofenac Sodium (VOLTAREN) 1 %; Apply 2 g topically 4 (four) times a day    acetaminophen (TYLENOL) 325 mg tablet; Take 2 tablets (650 mg total) by mouth every 6 (six) hours as needed for mild pain    Left wrist pain  See above.   Orders:    XR wrist 3+ vw left; Future    Hypothyroidism  Lab Results   Component Value Date    [id number] 6.997 (H) 2025      TSH slightly high, T4 normal. Reviewed adherence with medication and ensure taking on an empty stomach in the morning and do not eat/drink for at least 1 hour. Continue levothyroxine 125 mcg once daily. Recommend recheck in 8 weeks.   Orders:    levothyroxine 125 mcg tablet; Take 1 tablet (125 mcg total) by mouth daily    Mixed hyperlipidemia  Lab Results   Component Value Date    CHOLESTEROL 218 (H) 2025    CHOLESTEROL 209 (H) 2022    CHOLESTEROL 207 (H) 2021     Lab Results   Component Value Date    HDL 38 (L) 2025    HDL 42 2022    HDL 38 (L) 2021     Lab Results   Component Value Date    TRIG 522 (H) 2025    TRIG 329 (H) 2022    TRIG 394 (H) 2021     Lab Results   Component Value Date    NONHDLC 180 2025    NONHDLC 167 2022    NONHDLC 169 2021      Lab Results   Component Value Date    LDLCALC  2025      Comment:      Calculated LDL invalid, triglycerides >400 mg/dl  This screening LDL is a calculated result.   It does not have  the accuracy of the Direct Measured LDL in the monitoring of patients with hyperlipidemia and/or statin therapy.   Direct Measure LDL (KWX328) must be ordered separately in these patients.      Patient states he has been adherent with his atorvastatin. Will increase to 40 mg once daily. Reviewed low fat diet.   Orders:    atorvastatin (LIPITOR) 40 mg tablet; Take 1 tablet (40 mg total) by mouth daily AMRITA 1 TABLETA POR VIA ORAL DIARIAMENTE    Glaucoma of both eyes, unspecified glaucoma type  Followed by ophthalmology. Per patient request refilling timolol and brimonidine. Follow up with ophthalmology.   Orders:    timolol (TIMOPTIC) 0.5 % ophthalmic solution; Apply 1 drop to eye every morning    brimonidine (ALPHAGAN P) 0.15 % ophthalmic solution; Apply 1 drop to eye 2 (two) times a day    Elevated prolactin level  Prolactin levels have been elevated. Recommend discussing with psychiatry. May nee endo consult.               History of Present Illness   Patient is a 59-year-old male presenting for a same day.  He is complaining of left shoulder and wrist pain for approximately 1 month.  States he slipped and fell in the snow landing on an outstretched left arm.  He does not recall the day, just states about a month ago.  He was not seen after the injury.  States he has constant left shoulder and left wrist pain.  Worse with movement and use of left arm.  No radiation, pain isolated to these 2 areas.  Denies redness, swelling, or warmth.  Denies discoloration or ecchymosis.  Denies numbness, tingling, or weakness.  He cannot describe the pain.  Currently 4 out of 10 pain.  He has been using diclofenac gel with some improvement.  He is right-hand dominant.  No prior issues.  He would also like to discuss his recent blood work.  Use of  services was utilized during visit.      Review of Systems   Constitutional:  Negative for chills, fatigue and fever.   Respiratory:  Negative for cough, shortness of breath  "and wheezing.    Cardiovascular:  Negative for chest pain, palpitations and leg swelling.   Gastrointestinal:  Negative for abdominal pain, diarrhea, nausea and vomiting.   Musculoskeletal:  Positive for arthralgias and myalgias. Negative for back pain, gait problem and joint swelling.   Skin:  Negative for rash.   Neurological:  Negative for dizziness, weakness, light-headedness, numbness and headaches.   Hematological:  Negative for adenopathy.       Objective   /83 (BP Location: Right arm, Patient Position: Sitting, Cuff Size: Large)   Pulse 89   Temp 98.5 °F (36.9 °C) (Temporal)   Ht 5' 9\" (1.753 m)   Wt 83 kg (183 lb)   BMI 27.02 kg/m²      Physical Exam  Vitals and nursing note reviewed.   Constitutional:       General: He is awake. He is not in acute distress.     Appearance: Normal appearance. He is well-developed, well-groomed and overweight. He is not ill-appearing.   HENT:      Head: Normocephalic and atraumatic.   Cardiovascular:      Rate and Rhythm: Normal rate and regular rhythm.      Pulses: Normal pulses.           Radial pulses are 2+ on the right side and 2+ on the left side.      Heart sounds: Normal heart sounds. No murmur heard.  Pulmonary:      Effort: Pulmonary effort is normal. No respiratory distress.      Breath sounds: Normal breath sounds and air entry. No decreased air movement. No decreased breath sounds, wheezing, rhonchi or rales.   Musculoskeletal:         General: Normal range of motion.      Right shoulder: Normal.      Left shoulder: Tenderness (upper arm, over proxmal humerus) and bony tenderness present. No swelling, deformity, effusion, laceration or crepitus. Normal range of motion. Normal strength. Normal pulse.      Right upper arm: Normal.      Left upper arm: Normal.      Right elbow: Normal.      Left elbow: Normal.      Right forearm: Normal.      Left forearm: Normal.      Right wrist: Normal.      Left wrist: Tenderness (mid distal wrist) present. No " swelling, deformity, effusion, lacerations, bony tenderness, snuff box tenderness or crepitus. Normal range of motion. Normal pulse.      Right hand: Normal.      Left hand: Normal.      Cervical back: Neck supple.      Right lower leg: No edema.      Left lower leg: No edema.   Lymphadenopathy:      Cervical: No cervical adenopathy.   Skin:     General: Skin is warm.      Capillary Refill: Capillary refill takes less than 2 seconds.      Coloration: Skin is not jaundiced.      Findings: No rash.   Neurological:      General: No focal deficit present.      Mental Status: He is alert and oriented to person, place, and time. Mental status is at baseline.      Sensory: Sensation is intact.      Motor: Motor function is intact.      Coordination: Coordination is intact.      Gait: Gait is intact.      Comments: Upper extremity strength 5/5, equal and symmetric.  strength 5/5. Normal tone.    Psychiatric:         Attention and Perception: Attention normal.         Mood and Affect: Mood and affect normal.         Speech: Speech normal.         Behavior: Behavior normal. Behavior is cooperative.       Administrative Statements   I have spent a total time of 30 minutes in caring for this patient on the day of the visit/encounter including Diagnostic results, Prognosis, Risks and benefits of tx options, Instructions for management, Patient and family education, Importance of tx compliance, Risk factor reductions, Impressions, Counseling / Coordination of care, Reviewing/placing orders in the medical record (including tests, medications, and/or procedures), and Obtaining or reviewing history  .

## 2025-03-19 NOTE — ASSESSMENT & PLAN NOTE
Followed by ophthalmology. Per patient request refilling timolol and brimonidine. Follow up with ophthalmology.   Orders:    timolol (TIMOPTIC) 0.5 % ophthalmic solution; Apply 1 drop to eye every morning    brimonidine (ALPHAGAN P) 0.15 % ophthalmic solution; Apply 1 drop to eye 2 (two) times a day

## 2025-03-20 ENCOUNTER — HOSPITAL ENCOUNTER (OUTPATIENT)
Dept: RADIOLOGY | Facility: HOSPITAL | Age: 60
Discharge: HOME | End: 2025-03-20
Payer: MEDICARE

## 2025-03-20 ENCOUNTER — RESULTS FOLLOW-UP (OUTPATIENT)
Dept: INTERNAL MEDICINE CLINIC | Facility: CLINIC | Age: 60
End: 2025-03-20

## 2025-03-20 DIAGNOSIS — M25.532 LEFT WRIST PAIN: ICD-10-CM

## 2025-03-20 DIAGNOSIS — M25.512 ACUTE PAIN OF LEFT SHOULDER: ICD-10-CM

## 2025-03-20 PROCEDURE — 73110 X-RAY EXAM OF WRIST: CPT

## 2025-03-20 PROCEDURE — 73030 X-RAY EXAM OF SHOULDER: CPT

## 2025-03-26 DIAGNOSIS — M25.512 ACUTE PAIN OF LEFT SHOULDER: ICD-10-CM

## 2025-03-27 RX ORDER — ACETAMINOPHEN 325 MG/1
TABLET ORAL
Qty: 90 TABLET | Refills: 0 | Status: SHIPPED | OUTPATIENT
Start: 2025-03-27

## 2025-04-07 ENCOUNTER — OFFICE VISIT (OUTPATIENT)
Dept: DENTISTRY | Facility: CLINIC | Age: 60
End: 2025-04-07

## 2025-04-07 ENCOUNTER — RESULTS FOLLOW-UP (OUTPATIENT)
Dept: OTHER | Facility: HOSPITAL | Age: 60
End: 2025-04-07

## 2025-04-07 VITALS — DIASTOLIC BLOOD PRESSURE: 71 MMHG | HEART RATE: 97 BPM | SYSTOLIC BLOOD PRESSURE: 103 MMHG

## 2025-04-07 DIAGNOSIS — F31.9 BIPOLAR AFFECTIVE DISORDER, REMISSION STATUS UNSPECIFIED (HCC): ICD-10-CM

## 2025-04-07 DIAGNOSIS — K05.6 PERIODONTAL DISEASE: Primary | ICD-10-CM

## 2025-04-07 DIAGNOSIS — E03.9 HYPOTHYROIDISM, UNSPECIFIED TYPE: Primary | ICD-10-CM

## 2025-04-07 DIAGNOSIS — E78.2 MIXED HYPERLIPIDEMIA: ICD-10-CM

## 2025-04-07 PROCEDURE — D4342 PERIODONTAL SCALING AND ROOT PLANING - 1 TO 3 TEETH PER QUADRANT: HCPCS

## 2025-04-07 NOTE — PROGRESS NOTES
Procedure Details  27,28,31  - PERIODONTAL SCALING AND ROOT PLANING - 1 TO 3 TEETH PER QUADRANT    SCALE AND RP LR   Local anesthesia administered by Leni Geller RDH    2 carpule/s given - 20% Bencocaine topical used, 4% Septocaine 1:100K epi infiltrations, and Gingicaine topical anesthetic  CHIEF CONCERN: none   PAIN SCALE: 0  ASA CLASS: ASA 2 - Patient with mild systemic disease with no functional limitations  PLAQUE: heavy  CALCULUS: Generalized  Heavy  BLEEDING: heavy  STAIN : Generalized  Heavy  PERIO: Patient may require flap surgery following SRPs. Unable to get to base of pocket     Hand scaled and used cavitron    Oral Hygiene Instruction:  recommended brushing 2 x daily for 2 minutes MIN, recommended flossing daily, reviewed dietary precautions. Post op sc/rp instructions placed in AVS, printed and handed/ reviewed with patient.     Soft tissue exam:  soft tissue exam was normal  ExtraOral exam:   Extraoral exam was normal    REFERRALS: no referrals provided         NEXT VISIT:   --->sc/rp UL

## 2025-04-07 NOTE — DENTAL PROCEDURE DETAILS
SCALE AND RP LR   Local anesthesia administered by Leni Geller RDH    2 carpule/s given - 20% Bencocaine topical used, 4% Septocaine 1:100K epi infiltrations, and Gingicaine topical anesthetic  CHIEF CONCERN: none   PAIN SCALE: 0  ASA CLASS: ASA 2 - Patient with mild systemic disease with no functional limitations  PLAQUE: heavy  CALCULUS: Generalized  Heavy  BLEEDING: heavy  STAIN : Generalized  Heavy  PERIO: Patient may require flap surgery following SRPs. Unable to get to base of pocket     Hand scaled and used cavitron    Oral Hygiene Instruction:  recommended brushing 2 x daily for 2 minutes MIN, recommended flossing daily, reviewed dietary precautions. Post op sc/rp instructions placed in AVS, printed and handed/ reviewed with patient.     Soft tissue exam:  soft tissue exam was normal  ExtraOral exam:   Extraoral exam was normal    REFERRALS: no referrals provided         NEXT VISIT:   --->sc/rp UL

## 2025-04-10 ENCOUNTER — TELEPHONE (OUTPATIENT)
Dept: DENTISTRY | Facility: CLINIC | Age: 60
End: 2025-04-10

## 2025-05-05 DIAGNOSIS — H40.9 GLAUCOMA OF BOTH EYES, UNSPECIFIED GLAUCOMA TYPE: ICD-10-CM

## 2025-05-05 RX ORDER — BRIMONIDINE TARTRATE 1.5 MG/ML
1 SOLUTION/ DROPS OPHTHALMIC 2 TIMES DAILY
Qty: 10 ML | Refills: 0 | Status: SHIPPED | OUTPATIENT
Start: 2025-05-05

## 2025-05-10 DIAGNOSIS — H40.9 GLAUCOMA OF BOTH EYES, UNSPECIFIED GLAUCOMA TYPE: ICD-10-CM

## 2025-05-12 RX ORDER — TIMOLOL MALEATE 5 MG/ML
SOLUTION/ DROPS OPHTHALMIC
Qty: 10 ML | Refills: 0 | Status: SHIPPED | OUTPATIENT
Start: 2025-05-12

## 2025-05-14 DIAGNOSIS — J30.9 ALLERGIC RHINITIS: ICD-10-CM

## 2025-05-15 RX ORDER — FLUTICASONE PROPIONATE 50 MCG
2 SPRAY, SUSPENSION (ML) NASAL DAILY
Qty: 16 G | Refills: 3 | Status: SHIPPED | OUTPATIENT
Start: 2025-05-15

## 2025-05-16 DIAGNOSIS — E55.9 VITAMIN D DEFICIENCY: ICD-10-CM

## 2025-05-16 RX ORDER — CHOLECALCIFEROL (VITAMIN D3) 25 MCG
TABLET ORAL
Qty: 90 TABLET | Refills: 5 | Status: SHIPPED | OUTPATIENT
Start: 2025-05-16

## 2025-06-05 DIAGNOSIS — M25.512 ACUTE PAIN OF LEFT SHOULDER: ICD-10-CM

## 2025-06-05 DIAGNOSIS — E78.2 MIXED HYPERLIPIDEMIA: ICD-10-CM

## 2025-06-05 DIAGNOSIS — H40.9 GLAUCOMA OF BOTH EYES, UNSPECIFIED GLAUCOMA TYPE: ICD-10-CM

## 2025-06-06 RX ORDER — ATORVASTATIN CALCIUM 40 MG/1
40 TABLET, FILM COATED ORAL DAILY
Qty: 90 TABLET | Refills: 1 | Status: SHIPPED | OUTPATIENT
Start: 2025-06-06

## 2025-06-06 RX ORDER — BRIMONIDINE TARTRATE 1.5 MG/ML
1 SOLUTION/ DROPS OPHTHALMIC 2 TIMES DAILY
Qty: 10 ML | Refills: 1 | Status: SHIPPED | OUTPATIENT
Start: 2025-06-06

## 2025-06-06 RX ORDER — ACETAMINOPHEN 325 MG/1
650 TABLET ORAL EVERY 6 HOURS PRN
Qty: 90 TABLET | Refills: 1 | Status: SHIPPED | OUTPATIENT
Start: 2025-06-06

## 2025-06-06 NOTE — TELEPHONE ENCOUNTER
Let  #[id number] used Telugu for the duration of the telephone call.    Spoke with patient and updated his address.   Patient also said he is missing 2 other meds.

## 2025-06-13 DIAGNOSIS — E03.9 HYPOTHYROIDISM: ICD-10-CM

## 2025-06-13 RX ORDER — LEVOTHYROXINE SODIUM 125 UG/1
125 TABLET ORAL DAILY
Qty: 90 TABLET | Refills: 0 | Status: SHIPPED | OUTPATIENT
Start: 2025-06-13

## 2025-06-20 ENCOUNTER — APPOINTMENT (OUTPATIENT)
Dept: LAB | Facility: CLINIC | Age: 60
End: 2025-06-20
Payer: MEDICARE

## 2025-06-20 DIAGNOSIS — E03.9 HYPOTHYROIDISM, UNSPECIFIED TYPE: ICD-10-CM

## 2025-06-20 DIAGNOSIS — F31.9 BIPOLAR AFFECTIVE DISORDER, REMISSION STATUS UNSPECIFIED (HCC): ICD-10-CM

## 2025-06-20 DIAGNOSIS — E78.2 MIXED HYPERLIPIDEMIA: ICD-10-CM

## 2025-06-20 LAB
CHOLEST SERPL-MCNC: 136 MG/DL (ref ?–200)
HDLC SERPL-MCNC: 49 MG/DL
LDLC SERPL CALC-MCNC: 46 MG/DL (ref 0–100)
PROLACTIN SERPL-MCNC: 22.17 NG/ML (ref 2.64–13.13)
T4 FREE SERPL-MCNC: 0.8 NG/DL (ref 0.61–1.12)
TRIGL SERPL-MCNC: 205 MG/DL (ref ?–150)
TSH SERPL DL<=0.05 MIU/L-ACNC: 6.8 UIU/ML (ref 0.45–4.5)

## 2025-06-20 PROCEDURE — 84146 ASSAY OF PROLACTIN: CPT

## 2025-06-20 PROCEDURE — 36415 COLL VENOUS BLD VENIPUNCTURE: CPT

## 2025-06-20 PROCEDURE — 84439 ASSAY OF FREE THYROXINE: CPT

## 2025-06-20 PROCEDURE — 84443 ASSAY THYROID STIM HORMONE: CPT

## 2025-06-20 PROCEDURE — 80061 LIPID PANEL: CPT

## 2025-06-23 ENCOUNTER — OFFICE VISIT (OUTPATIENT)
Dept: INTERNAL MEDICINE CLINIC | Facility: CLINIC | Age: 60
End: 2025-06-23

## 2025-06-23 VITALS
HEIGHT: 69 IN | DIASTOLIC BLOOD PRESSURE: 70 MMHG | RESPIRATION RATE: 18 BRPM | SYSTOLIC BLOOD PRESSURE: 125 MMHG | TEMPERATURE: 98.2 F | HEART RATE: 90 BPM | BODY MASS INDEX: 28.58 KG/M2 | OXYGEN SATURATION: 96 % | WEIGHT: 193 LBS

## 2025-06-23 DIAGNOSIS — K21.9 GASTROESOPHAGEAL REFLUX DISEASE WITHOUT ESOPHAGITIS: ICD-10-CM

## 2025-06-23 DIAGNOSIS — K59.00 CONSTIPATION, UNSPECIFIED CONSTIPATION TYPE: ICD-10-CM

## 2025-06-23 DIAGNOSIS — E78.2 MIXED HYPERLIPIDEMIA: ICD-10-CM

## 2025-06-23 DIAGNOSIS — J30.9 ALLERGIC RHINITIS: ICD-10-CM

## 2025-06-23 DIAGNOSIS — E03.9 HYPOTHYROIDISM: ICD-10-CM

## 2025-06-23 DIAGNOSIS — R79.89 LOW VITAMIN B12 LEVEL: ICD-10-CM

## 2025-06-23 DIAGNOSIS — E55.9 VITAMIN D DEFICIENCY: ICD-10-CM

## 2025-06-23 DIAGNOSIS — R07.9 CHEST PAIN, UNSPECIFIED TYPE: ICD-10-CM

## 2025-06-23 DIAGNOSIS — R14.0 BLOATING: ICD-10-CM

## 2025-06-23 DIAGNOSIS — Z00.00 MEDICARE ANNUAL WELLNESS VISIT, SUBSEQUENT: Primary | ICD-10-CM

## 2025-06-23 PROCEDURE — G0439 PPPS, SUBSEQ VISIT: HCPCS | Performed by: HOSPITALIST

## 2025-06-23 RX ORDER — FLUTICASONE PROPIONATE 50 MCG
2 SPRAY, SUSPENSION (ML) NASAL DAILY
Qty: 16 G | Refills: 3 | Status: SHIPPED | OUTPATIENT
Start: 2025-06-23

## 2025-06-23 RX ORDER — FAMOTIDINE 20 MG/1
20 TABLET, FILM COATED ORAL
Qty: 60 TABLET | Refills: 2 | Status: SHIPPED | OUTPATIENT
Start: 2025-06-23 | End: 2025-12-20

## 2025-06-23 RX ORDER — ATORVASTATIN CALCIUM 40 MG/1
40 TABLET, FILM COATED ORAL DAILY
Qty: 90 TABLET | Refills: 1 | Status: SHIPPED | OUTPATIENT
Start: 2025-06-23

## 2025-06-23 RX ORDER — CALCIUM CARBONATE/VITAMIN D3 600 MG-10
250 TABLET ORAL DAILY
Qty: 90 TABLET | Refills: 3 | Status: SHIPPED | OUTPATIENT
Start: 2025-06-23

## 2025-06-23 RX ORDER — OMEGA-3-ACID ETHYL ESTERS 1 G/1
1 CAPSULE, LIQUID FILLED ORAL DAILY
Qty: 180 CAPSULE | Refills: 5 | Status: SHIPPED | OUTPATIENT
Start: 2025-06-23

## 2025-06-23 RX ORDER — SIMETHICONE 125 MG
125 CAPSULE ORAL EVERY 8 HOURS PRN
Qty: 90 CAPSULE | Refills: 1 | Status: SHIPPED | OUTPATIENT
Start: 2025-06-23

## 2025-06-23 RX ORDER — POLYETHYLENE GLYCOL 3350 17 G/17G
17 POWDER, FOR SOLUTION ORAL DAILY PRN
Qty: 90 EACH | Refills: 0 | Status: SHIPPED | OUTPATIENT
Start: 2025-06-23 | End: 2025-09-21

## 2025-06-23 RX ORDER — CHOLECALCIFEROL (VITAMIN D3) 25 MCG
TABLET ORAL
Qty: 90 TABLET | Refills: 5 | Status: SHIPPED | OUTPATIENT
Start: 2025-06-23

## 2025-06-23 RX ORDER — PANTOPRAZOLE SODIUM 40 MG/1
40 TABLET, DELAYED RELEASE ORAL DAILY
Qty: 30 TABLET | Refills: 2 | Status: SHIPPED | OUTPATIENT
Start: 2025-06-23 | End: 2025-09-21

## 2025-06-23 RX ORDER — LEVOTHYROXINE SODIUM 150 UG/1
150 TABLET ORAL
Qty: 90 TABLET | Refills: 1 | Status: SHIPPED | OUTPATIENT
Start: 2025-06-23

## 2025-06-23 NOTE — ASSESSMENT & PLAN NOTE
"Pt presents with atypical left sided chest pain. It is described as \"scratching\" and is non-radiating. He denies trauma to the area, radiation of pain to the jaw and upper extremities, diaphoresis, dyspnea, and palpitations. EKG in the clinic showed normal sinus rhythm. Pt reports having similar complaint in the past. Echo stress test in June, 2024 was negative for ischemia. Pt's symptoms are likely MSK in nature; there is low concern for cardiac origin at this time.        "

## 2025-06-23 NOTE — ASSESSMENT & PLAN NOTE
Orders:    famotidine (PEPCID) 20 mg tablet; Take 1 tablet (20 mg total) by mouth daily at bedtime as needed for heartburn    pantoprazole (PROTONIX) 40 mg tablet; Take 1 tablet (40 mg total) by mouth daily

## 2025-06-23 NOTE — ASSESSMENT & PLAN NOTE
TSH was 6.800. Pt states that he has been compliant with levothyroxine, has been taking it daily 1 hour before breakfast. Discussed with the pt that levothyroxine should be increased to 150 mcg and TSH will be rechecked in 3 months. Pt expresses understanding.     Orders:    levothyroxine 150 mcg tablet; Take 1 tablet (150 mcg total) by mouth daily in the early morning    TSH, 3rd generation with Free T4 reflex; Future

## 2025-06-23 NOTE — ASSESSMENT & PLAN NOTE
Lipid panel: cholesterol 136, triglycerides 205, HDL 49, LDL 46  Continue atorvastatin 40 mg daily. Will start pt on omega-3 to improve triglycerides.     Orders:    atorvastatin (LIPITOR) 40 mg tablet; Take 1 tablet (40 mg total) by mouth daily AMRITA 1 TABLETA POR VIA ORAL DIARIAMENTE    omega-3-acid ethyl esters (LOVAZA) 1 g capsule; Take 1 capsule (1 g total) by mouth daily

## 2025-06-23 NOTE — PROGRESS NOTES
"Name: Luis Manuel Moulton      : 1965      MRN: [id number]  Encounter Provider: Antonella Gonsales MD  Encounter Date: 2025   Encounter department: [facility] [place]  :  Assessment & Plan  Medicare annual wellness visit, subsequent    Orders:    Hemoglobin A1C; Future    Chest pain, unspecified type  Pt presents with atypical left sided chest pain. It is described as \"scratching\" and is non-radiating. He denies trauma to the area, radiation of pain to the jaw and upper extremities, diaphoresis, dyspnea, and palpitations. EKG in the clinic showed normal sinus rhythm. Pt reports having similar complaint in the past. Echo stress test in  was negative for ischemia. Pt's symptoms are likely MSK in nature; there is low concern for cardiac origin at this time.        Hypothyroidism  TSH was 6.800. Pt states that he has been compliant with levothyroxine, has been taking it daily 1 hour before breakfast. Discussed with the pt that levothyroxine should be increased to 150 mcg and TSH will be rechecked in 3 months. Pt expresses understanding.     Orders:    levothyroxine 150 mcg tablet; Take 1 tablet (150 mcg total) by mouth daily in the early morning    TSH, 3rd generation with Free T4 reflex; Future    Mixed hyperlipidemia  Lipid panel: cholesterol 136, triglycerides 205, HDL 49, LDL 46  Continue atorvastatin 40 mg daily. Will start pt on omega-3 to improve triglycerides.     Orders:    atorvastatin (LIPITOR) 40 mg tablet; Take 1 tablet (40 mg total) by mouth daily AMRITA 1 TABLETA POR VIA ORAL DIARIAMENTE    omega-3-acid ethyl esters (LOVAZA) 1 g capsule; Take 1 capsule (1 g total) by mouth daily    Gastroesophageal reflux disease without esophagitis    Orders:    famotidine (PEPCID) 20 mg tablet; Take 1 tablet (20 mg total) by mouth daily at bedtime as needed for heartburn    pantoprazole (PROTONIX) 40 mg tablet; Take 1 tablet (40 mg total) by mouth daily    Constipation, " "unspecified constipation type    Orders:    polyethylene glycol (MIRALAX) 17 g packet; Take 17 g by mouth daily as needed (constipation)    Vitamin D deficiency    Orders:    Cholecalciferol (Vitamin D3) 25 MCG TABS; AMRITA 1 TABLETA POR VIA ORAL DIARIAMENTE    Low vitamin B12 level    Orders:    vitamin B-12 (CYANOCOBALAMIN) 250 MCG tablet; Take 1 tablet (250 mcg total) by mouth daily    Bloating    Orders:    simethicone (MYLICON,GAS-X) 125 MG CAPS; Take 1 capsule (125 mg total) by mouth every 8 (eight) hours as needed for flatulence (stomach gas)    Allergic rhinitis    Orders:    fluticasone (FLONASE) 50 mcg/act nasal spray; 2 sprays into each nostril daily       Preventive health issues were discussed with patient, and age appropriate screening tests were ordered as noted in patient's After Visit Summary. Personalized health advice and appropriate referrals for health education or preventive services given if needed, as noted in patient's After Visit Summary.    History of Present Illness     This is a 59 year old male with PMH of hypothyroidism, HLD, and GERD who presents for a medicare annual wellness visit. Pt reports having \"scratching\" left sided chest pain. The pain occurs at rest and does not worsen with exertion. Pt states that it improves with tylenol. He denies trauma to the area, radiation of pain to the jaw and upper extremities, diaphoresis, dyspnea, and palpitations.        Patient Care Team:  Emily Gutierrez DO as PCP - General (Internal Medicine)  MD Virgil Guillaume MD as Endoscopist    Review of Systems   Constitutional:  Negative for chills, diaphoresis and fever.   HENT:  Negative for ear pain and sore throat.    Eyes:  Negative for pain and visual disturbance.   Respiratory:  Negative for cough and shortness of breath.    Cardiovascular:  Positive for chest pain. Negative for palpitations and leg swelling.   Gastrointestinal:  Positive for constipation. Negative for abdominal " pain, blood in stool, diarrhea and vomiting.   Genitourinary:  Negative for dysuria and hematuria.   Musculoskeletal:  Negative for arthralgias and back pain.   Skin:  Negative for color change and rash.   Neurological:  Negative for dizziness, seizures, syncope and headaches.   All other systems reviewed and are negative.    Medical History Reviewed by provider this encounter:       Annual Wellness Visit Questionnaire       Health Risk Assessment:   Patient rates overall health as good. Patient feels that their physical health rating is same. Patient is very satisfied with their life. Eyesight was rated as same. Hearing was rated as much better. Patient feels that their emotional and mental health rating is same. Patients states they are never, rarely angry. Patient states they are sometimes unusually tired/fatigued. Pain experienced in the last 7 days has been some. Patient's pain rating has been 6/10. Patient states that he has experienced no weight loss or gain in last 6 months.     Depression Screening:   PHQ-2 Score: 0      Fall Risk Screening:   In the past year, patient has experienced: history of falling in past year    Number of falls: 2 or more  Injured during fall?: Yes    Feels unsteady when standing or walking?: No    Worried about falling?: Yes      Home Safety:  Patient has trouble with stairs inside or outside of their home. Patient has working smoke alarms and has working carbon monoxide detector. Home safety hazards include: none.     Nutrition:   Current diet is Regular.     Medications:   Patient is currently taking over-the-counter supplements. OTC medications include: see medication list. Patient is able to manage medications.     Activities of Daily Living (ADLs)/Instrumental Activities of Daily Living (IADLs):   Walk and transfer into and out of bed and chair?: Yes  Dress and groom yourself?: Yes    Bathe or shower yourself?: Yes    Feed yourself? Yes  Do your laundry/housekeeping?:  Yes  Manage your money, pay your bills and track your expenses?: Yes  Make your own meals?: No    Do your own shopping?: Yes    Previous Hospitalizations:   Any hospitalizations or ED visits within the last 12 months?: No      Preventive Screenings      Cardiovascular Screening:    General: Screening Not Indicated and History Lipid Disorder      Diabetes Screening:     General: Screening Current      Colorectal Cancer Screening:     General: Screening Current      Lung Cancer Screening:     General: Screening Not Indicated      Hepatitis C Screening:    General: Screening Current    Immunizations:  - Immunizations due: Prevnar 20 and Zoster (Shingrix)    Screening, Brief Intervention, and Referral to Treatment (SBIRT)     Screening  Typical number of drinks in a day: 0  Typical number of drinks in a week: 0  Interpretation: Low risk drinking behavior.    Single Item Drug Screening:  How often have you used an illegal drug (including marijuana) or a prescription medication for non-medical reasons in the past year? never    Single Item Drug Screen Score: 0  Interpretation: Negative screen for possible drug use disorder    Review of Current Opioid Use    Opioid Risk Tool (ORT) Interpretation: Complete Opioid Risk Tool (ORT)    Social Drivers of Health     Financial Resource Strain: Low Risk  (6/23/2025)    Overall Financial Resource Strain (CARDIA)     Difficulty of Paying Living Expenses: Not hard at all   Food Insecurity: No Food Insecurity (6/23/2025)    Nursing - Inadequate Food Risk Classification     Worried About Running Out of Food in the Last Year: Never true     Ran Out of Food in the Last Year: Never true   Transportation Needs: No Transportation Needs (6/23/2025)    PRAPARE - Transportation     Lack of Transportation (Medical): No     Lack of Transportation (Non-Medical): No   Housing Stability: Low Risk  (6/23/2025)    Housing Stability Vital Sign     Unable to Pay for Housing in the Last Year: No      "Number of Times Moved in the Last Year: 0     Homeless in the Last Year: No   Utilities: Not At Risk (6/23/2025)    [facility] Utilities     Threatened with loss of utilities: No     No results found.    Objective   /70 (BP Location: Right arm, Patient Position: Sitting, Cuff Size: Standard)   Pulse 90   Temp 98.2 °F (36.8 °C) (Tympanic)   Resp 18   Ht 5' 9\" (1.753 m)   Wt 87.5 kg (193 lb)   SpO2 96%   BMI 28.50 kg/m²     Physical Exam  Constitutional:       General: He is not in acute distress.  HENT:      Head: Normocephalic and atraumatic.     Eyes:      Conjunctiva/sclera: Conjunctivae normal.     Neck:      Thyroid: No thyromegaly or thyroid tenderness.     Cardiovascular:      Rate and Rhythm: Normal rate and regular rhythm.      Pulses: Normal pulses.      Heart sounds: S1 normal and S2 normal. No murmur heard.     No friction rub. No gallop.   Pulmonary:      Breath sounds: Normal breath sounds. No wheezing, rhonchi or rales.   Chest:      Chest wall: No tenderness.   Abdominal:      General: Bowel sounds are normal. There is no distension.      Palpations: Abdomen is soft.      Tenderness: There is no abdominal tenderness.     Musculoskeletal:      Right lower leg: No edema.      Left lower leg: No edema.   Lymphadenopathy:      Cervical: No cervical adenopathy.     Skin:     General: Skin is warm and dry.     Neurological:      General: No focal deficit present.      Mental Status: He is alert and oriented to person, place, and time.         "

## 2025-06-23 NOTE — PATIENT INSTRUCTIONS
Medicare Preventive Visit Patient Instructions  Thank you for completing your Welcome to Medicare Visit or Medicare Annual Wellness Visit today. Your next wellness visit will be due in one year (6/24/2026).  The screening/preventive services that you may require over the next 5-10 years are detailed below. Some tests may not apply to you based off risk factors and/or age. Screening tests ordered at today's visit but not completed yet may show as past due. Also, please note that scanned in results may not display below.  Preventive Screenings:  Service Recommendations Previous Testing/Comments   Colorectal Cancer Screening  Colonoscopy    Fecal Occult Blood Test (FOBT)/Fecal Immunochemical Test (FIT)  Fecal DNA/Cologuard Test  Flexible Sigmoidoscopy Age: 45-75 years old   Colonoscopy: every 10 years (May be performed more frequently if at higher risk)  OR  FOBT/FIT: every 1 year  OR  Cologuard: every 3 years  OR  Sigmoidoscopy: every 5 years  Screening may be recommended earlier than age 45 if at higher risk for colorectal cancer. Also, an individualized decision between you and your healthcare provider will decide whether screening between the ages of 76-85 would be appropriate. Colonoscopy: 10/11/2024  FOBT/FIT: Not on file  Cologuard: Not on file  Sigmoidoscopy: Not on file    Screening Current     Prostate Cancer Screening Individualized decision between patient and health care provider in men between ages of 55-69   Medicare will cover every 12 months beginning on the day after your 50th birthday PSA: No results in last 5 years           Hepatitis C Screening Once for adults born between 1945 and 1965  More frequently in patients at high risk for Hepatitis C Hep C Antibody: 05/23/2018    Screening Current   Diabetes Screening 1-2 times per year if you're at risk for diabetes or have pre-diabetes Fasting glucose: 92 mg/dL (3/5/2025)  A1C: 5.7 % (7/12/2022)  Screening Current   Cholesterol Screening Once every 5  years if you don't have a lipid disorder. May order more often based on risk factors. Lipid panel: 06/20/2025  Screening Not Indicated  History Lipid Disorder      Other Preventive Screenings Covered by Medicare:  Abdominal Aortic Aneurysm (AAA) Screening: covered once if your at risk. You're considered to be at risk if you have a family history of AAA or a male between the age of 65-75 who smoking at least 100 cigarettes in your lifetime.  Lung Cancer Screening: covers low dose CT scan once per year if you meet all of the following conditions: (1) Age 55-77; (2) No signs or symptoms of lung cancer; (3) Current smoker or have quit smoking within the last 15 years; (4) You have a tobacco smoking history of at least 20 pack years (packs per day x number of years you smoked); (5) You get a written order from a healthcare provider.  Glaucoma Screening: covered annually if you're considered high risk: (1) You have diabetes OR (2) Family history of glaucoma OR (3)  aged 50 and older OR (4)  American aged 65 and older  Osteoporosis Screening: covered every 2 years if you meet one of the following conditions: (1) Have a vertebral abnormality; (2) On glucocorticoid therapy for more than 3 months; (3) Have primary hyperparathyroidism; (4) On osteoporosis medications and need to assess response to drug therapy.  HIV Screening: covered annually if you're between the age of 15-65. Also covered annually if you are younger than 15 and older than 65 with risk factors for HIV infection. For pregnant patients, it is covered up to 3 times per pregnancy.    Immunizations:  Immunization Recommendations   Influenza Vaccine Annual influenza vaccination during flu season is recommended for all persons aged >= 6 months who do not have contraindications   Pneumococcal Vaccine   * Pneumococcal conjugate vaccine = PCV13 (Prevnar 13), PCV15 (Vaxneuvance), PCV20 (Prevnar 20)  * Pneumococcal polysaccharide vaccine = PPSV23  (Pneumovax) Adults 19-65 yo with certain risk factors or if 65+ yo  If never received any pneumonia vaccine: recommend Prevnar 20 (PCV20)  Give PCV20 if previously received 1 dose of PCV13 or PPSV23   Hepatitis B Vaccine 3 dose series if at intermediate or high risk (ex: diabetes, end stage renal disease, liver disease)   Respiratory syncytial virus (RSV) Vaccine - COVERED BY MEDICARE PART D  * RSVPreF3 (Arexvy) CDC recommends that adults 60 years of age and older may receive a single dose of RSV vaccine using shared clinical decision-making (SCDM)   Tetanus (Td) Vaccine - COST NOT COVERED BY MEDICARE PART B Following completion of primary series, a booster dose should be given every 10 years to maintain immunity against tetanus. Td may also be given as tetanus wound prophylaxis.   Tdap Vaccine - COST NOT COVERED BY MEDICARE PART B Recommended at least once for all adults. For pregnant patients, recommended with each pregnancy.   Shingles Vaccine (Shingrix) - COST NOT COVERED BY MEDICARE PART B  2 shot series recommended in those 19 years and older who have or will have weakened immune systems or those 50 years and older     Health Maintenance Due:      Topic Date Due   • Colorectal Cancer Screening  10/10/2031   • HIV Screening  Completed   • Hepatitis C Screening  Completed     Immunizations Due:      Topic Date Due   • Pneumococcal Vaccine: 50+ Years (2 of 2 - PCV) 06/25/2019   • COVID-19 Vaccine (2 - 2024-25 season) 09/01/2024     Advance Directives   What are advance directives?  Advance directives are legal documents that state your wishes and plans for medical care. These plans are made ahead of time in case you lose your ability to make decisions for yourself. Advance directives can apply to any medical decision, such as the treatments you want, and if you want to donate organs.   What are the types of advance directives?  There are many types of advance directives, and each state has rules about how to use  them. You may choose a combination of any of the following:  Living will:  This is a written record of the treatment you want. You can also choose which treatments you do not want, which to limit, and which to stop at a certain time. This includes surgery, medicine, IV fluid, and tube feedings.   Durable power of  for healthcare (DPAHC):  This is a written record that states who you want to make healthcare choices for you when you are unable to make them for yourself. This person, called a proxy, is usually a family member or a friend. You may choose more than 1 proxy.  Do not resuscitate (DNR) order:  A DNR order is used in case your heart stops beating or you stop breathing. It is a request not to have certain forms of treatment, such as CPR. A DNR order may be included in other types of advance directives.  Medical directive:  This covers the care that you want if you are in a coma, near death, or unable to make decisions for yourself. You can list the treatments you want for each condition. Treatment may include pain medicine, surgery, blood transfusions, dialysis, IV or tube feedings, and a ventilator (breathing machine).  Values history:  This document has questions about your views, beliefs, and how you feel and think about life. This information can help others choose the care that you would choose.  Why are advance directives important?  An advance directive helps you control your care. Although spoken wishes may be used, it is better to have your wishes written down. Spoken wishes can be misunderstood, or not followed. Treatments may be given even if you do not want them. An advance directive may make it easier for your family to make difficult choices about your care.   Weight Management   Why it is important to manage your weight:  Being overweight increases your risk of health conditions such as heart disease, high blood pressure, type 2 diabetes, and certain types of cancer. It can also  increase your risk for osteoarthritis, sleep apnea, and other respiratory problems. Aim for a slow, steady weight loss. Even a small amount of weight loss can lower your risk of health problems.  How to lose weight safely:  A safe and healthy way to lose weight is to eat fewer calories and get regular exercise. You can lose up about 1 pound a week by decreasing the number of calories you eat by 500 calories each day.   Healthy meal plan for weight management:  A healthy meal plan includes a variety of foods, contains fewer calories, and helps you stay healthy. A healthy meal plan includes the following:  Eat whole-grain foods more often.  A healthy meal plan should contain fiber. Fiber is the part of grains, fruits, and vegetables that is not broken down by your body. Whole-grain foods are healthy and provide extra fiber in your diet. Some examples of whole-grain foods are whole-wheat breads and pastas, oatmeal, brown rice, and bulgur.  Eat a variety of vegetables every day.  Include dark, leafy greens such as spinach, kale, cipriano greens, and mustard greens. Eat yellow and orange vegetables such as carrots, sweet potatoes, and winter squash.   Eat a variety of fruits every day.  Choose fresh or canned fruit (canned in its own juice or light syrup) instead of juice. Fruit juice has very little or no fiber.  Eat low-fat dairy foods.  Drink fat-free (skim) milk or 1% milk. Eat fat-free yogurt and low-fat cottage cheese. Try low-fat cheeses such as mozzarella and other reduced-fat cheeses.  Choose meat and other protein foods that are low in fat.  Choose beans or other legumes such as split peas or lentils. Choose fish, skinless poultry (chicken or turkey), or lean cuts of red meat (beef or pork). Before you cook meat or poultry, cut off any visible fat.   Use less fat and oil.  Try baking foods instead of frying them. Add less fat, such as margarine, sour cream, regular salad dressing and mayonnaise to foods. Eat  fewer high-fat foods. Some examples of high-fat foods include french fries, doughnuts, ice cream, and cakes.  Eat fewer sweets.  Limit foods and drinks that are high in sugar. This includes candy, cookies, regular soda, and sweetened drinks.  Exercise:  Exercise at least 30 minutes per day on most days of the week. Some examples of exercise include walking, biking, dancing, and swimming. You can also fit in more physical activity by taking the stairs instead of the elevator or parking farther away from stores. Ask your healthcare provider about the best exercise plan for you.   Narcotic (Opioid) Safety    Use narcotics safely:  Take prescribed narcotics exactly as directed  Do not give narcotics to others or take narcotics that belong to someone else  Do not mix narcotics without medicines or alcohol  Do not drive or operate heavy machinery after you take the narcotic  Monitor for side effects and notify your healthcare provider if you experienced side effects such as nausea, sleepiness, itching, or trouble thinking clearly.    Manage constipation:    Constipation is the most common side effect of narcotic medicine. Constipation is when you have hard, dry bowel movements, or you go longer than usual between bowel movements. Tell your healthcare provider about all changes in your bowel movements while you are taking narcotics. He or she may recommend laxative medicine to help you have a bowel movement. He or she may also change the kind of narcotic you are taking, or change when you take it. The following are more ways you can prevent or relieve constipation:    Drink liquids as directed.  You may need to drink extra liquids to help soften and move your bowels. Ask how much liquid to drink each day and which liquids are best for you.  Eat high-fiber foods.  This may help decrease constipation by adding bulk to your bowel movements. High-fiber foods include fruits, vegetables, whole-grain breads and cereals, and  beans. Your healthcare provider or dietitian can help you create a high-fiber meal plan. Your provider may also recommend a fiber supplement if you cannot get enough fiber from food.  Exercise regularly.  Regular physical activity can help stimulate your intestines. Walking is a good exercise to prevent or relieve constipation. Ask which exercises are best for you.  Schedule a time each day to have a bowel movement.  This may help train your body to have regular bowel movements. Bend forward while you are on the toilet to help move the bowel movement out. Sit on the toilet for at least 10 minutes, even if you do not have a bowel movement.    Store narcotics safely:   Store narcotics where others cannot easily get them.  Keep them in a locked cabinet or secure area. Do not  keep them in a purse or other bag you carry with you. A person may be looking for something else and find the narcotics.  Make sure narcotics are stored out of the reach of children.  A child can easily overdose on narcotics. Narcotics may look like candy to a small child.    The best way to dispose of narcotics:      The laws vary by country and area. In the [place], the best way is to return the narcotics through a take-back program. This program is offered by the [place] Drug Enforcement Agency (JUDY). The following are options for using the program:  Take the narcotics to a JUDY collection site.  The site is often a law enforcement center. Call your local law enforcement center for scheduled take-back days in your area. You will be given information on where to go if the collection site is in a different location.  Take the narcotics to an approved pharmacy or hospital.  A pharmacy or hospital may be set up as a collection site. You will need to ask if it is a JUDY collection site if you were not directed there. A pharmacy or doctor's office may not be able to take back narcotics unless it is a JUDY site.  Use a mail-back system.  This means you  are given containers to put the narcotics into. You will then mail them in the containers.  Use a take-back drop box.  This is a place to leave the narcotics at any time. People and animals will not be able to get into the box. Your local law enforcement agency can tell you where to find a drop box in your area.    Other ways to manage pain:   Ask your healthcare provider about non-narcotic medicines to control pain.  Nonprescription medicines include NSAIDs (such as ibuprofen) and acetaminophen. Prescription medicines include muscle relaxers, antidepressants, and steroids.  Pain may be managed without any medicines.  Some ways to relieve pain include massage, aromatherapy, or meditation. Physical or occupational therapy may also help.    For more information:   Drug Enforcement Administration  [address] [place] [address]  Phone: 4- 868 - 190-0336  Web Address: https://www.deadiversion.[employer].gov/drug_disposal/    [place] Food and Drug Administration  [address] [place] [address]  Phone: 7- 387 - 904-7297  Web Address: http://www.fda.gov   © Copyright [employer] 2018 Information is for End User's use only and may not be sold, redistributed or otherwise used for commercial purposes. All illustrations and images included in CareNotes® are the copyrighted property of [employer] or [employer]

## 2025-06-23 NOTE — ASSESSMENT & PLAN NOTE
Orders:    polyethylene glycol (MIRALAX) 17 g packet; Take 17 g by mouth daily as needed (constipation)

## 2025-07-11 DIAGNOSIS — H40.9 GLAUCOMA OF BOTH EYES, UNSPECIFIED GLAUCOMA TYPE: ICD-10-CM

## 2025-07-11 RX ORDER — TIMOLOL MALEATE 5 MG/ML
SOLUTION/ DROPS OPHTHALMIC
Qty: 10 ML | Refills: 3 | Status: SHIPPED | OUTPATIENT
Start: 2025-07-11